# Patient Record
Sex: FEMALE | Race: WHITE | NOT HISPANIC OR LATINO | Employment: OTHER | URBAN - METROPOLITAN AREA
[De-identification: names, ages, dates, MRNs, and addresses within clinical notes are randomized per-mention and may not be internally consistent; named-entity substitution may affect disease eponyms.]

---

## 2017-12-13 DIAGNOSIS — Z13.820 OSTEOPOROSIS SCREENING: Primary | ICD-10-CM

## 2017-12-13 DIAGNOSIS — Z12.31 SCREENING MAMMOGRAM, ENCOUNTER FOR: Primary | ICD-10-CM

## 2017-12-29 ENCOUNTER — HOSPITAL ENCOUNTER (OUTPATIENT)
Dept: RADIOLOGY | Facility: HOSPITAL | Age: 67
Discharge: HOME | End: 2017-12-29
Attending: FAMILY MEDICINE
Payer: MEDICARE

## 2017-12-29 DIAGNOSIS — Z13.820 OSTEOPOROSIS SCREENING: ICD-10-CM

## 2017-12-29 DIAGNOSIS — Z12.31 SCREENING MAMMOGRAM, ENCOUNTER FOR: ICD-10-CM

## 2017-12-29 PROCEDURE — 77067 SCR MAMMO BI INCL CAD: CPT | Mod: TC

## 2017-12-29 PROCEDURE — 77081 DXA BONE DENSITY APPENDICULR: CPT | Mod: 26,,, | Performed by: RADIOLOGY

## 2017-12-29 PROCEDURE — 77063 BREAST TOMOSYNTHESIS BI: CPT | Mod: 26,,, | Performed by: RADIOLOGY

## 2017-12-29 PROCEDURE — 77067 SCR MAMMO BI INCL CAD: CPT | Mod: 26,,, | Performed by: RADIOLOGY

## 2017-12-29 PROCEDURE — 77081 DXA BONE DENSITY APPENDICULR: CPT | Mod: TC

## 2018-06-03 ENCOUNTER — HOSPITAL ENCOUNTER (INPATIENT)
Facility: HOSPITAL | Age: 68
LOS: 1 days | Discharge: HOME | DRG: 176 | End: 2018-06-04
Attending: EMERGENCY MEDICINE | Admitting: INTERNAL MEDICINE
Payer: MEDICARE

## 2018-06-03 DIAGNOSIS — I26.99 OTHER ACUTE PULMONARY EMBOLISM WITHOUT ACUTE COR PULMONALE: Primary | ICD-10-CM

## 2018-06-03 DIAGNOSIS — I26.09 OTHER PULMONARY EMBOLISM WITH ACUTE COR PULMONALE, UNSPECIFIED CHRONICITY: ICD-10-CM

## 2018-06-03 DIAGNOSIS — R06.02 SHORTNESS OF BREATH: ICD-10-CM

## 2018-06-03 DIAGNOSIS — I26.99 PULMONARY EMBOLISM: ICD-10-CM

## 2018-06-03 PROBLEM — E78.5 HYPERLIPIDEMIA: Status: ACTIVE | Noted: 2018-06-03

## 2018-06-03 PROBLEM — K21.9 GERD (GASTROESOPHAGEAL REFLUX DISEASE): Status: ACTIVE | Noted: 2018-06-03

## 2018-06-03 LAB
ALBUMIN SERPL BCP-MCNC: 3.8 G/DL
ALP SERPL-CCNC: 89 U/L
ALT SERPL W/O P-5'-P-CCNC: 26 U/L
ANION GAP SERPL CALC-SCNC: 13 MMOL/L
APTT BLDCRRT: 24.7 SEC
APTT BLDCRRT: 25.3 SEC
AST SERPL-CCNC: 27 U/L
BASOPHILS # BLD AUTO: 0.02 K/UL
BASOPHILS # BLD AUTO: 0.03 K/UL
BASOPHILS NFR BLD: 0.3 %
BASOPHILS NFR BLD: 0.3 %
BILIRUB SERPL-MCNC: 0.5 MG/DL
BNP SERPL-MCNC: 38 PG/ML
BUN SERPL-MCNC: 14 MG/DL
CALCIUM SERPL-MCNC: 9.6 MG/DL
CHLORIDE SERPL-SCNC: 106 MMOL/L
CHOLEST SERPL-MCNC: 203 MG/DL
CHOLEST/HDLC SERPL: 4.5 {RATIO}
CO2 SERPL-SCNC: 21 MMOL/L
CREAT SERPL-MCNC: 0.9 MG/DL
DIFFERENTIAL METHOD: ABNORMAL
DIFFERENTIAL METHOD: ABNORMAL
EOSINOPHIL # BLD AUTO: 0.1 K/UL
EOSINOPHIL # BLD AUTO: 0.1 K/UL
EOSINOPHIL NFR BLD: 0.7 %
EOSINOPHIL NFR BLD: 1 %
ERYTHROCYTE [DISTWIDTH] IN BLOOD BY AUTOMATED COUNT: 13.5 %
ERYTHROCYTE [DISTWIDTH] IN BLOOD BY AUTOMATED COUNT: 13.5 %
EST. GFR  (AFRICAN AMERICAN): >60 ML/MIN/1.73 M^2
EST. GFR  (NON AFRICAN AMERICAN): >60 ML/MIN/1.73 M^2
ESTIMATED AVG GLUCOSE: 103 MG/DL
GLUCOSE SERPL-MCNC: 150 MG/DL
HBA1C MFR BLD HPLC: 5.2 %
HCT VFR BLD AUTO: 42.9 %
HCT VFR BLD AUTO: 43.9 %
HDLC SERPL-MCNC: 45 MG/DL
HDLC SERPL: 22.2 %
HGB BLD-MCNC: 13.3 G/DL
HGB BLD-MCNC: 14 G/DL
INR PPP: 1
INR PPP: 1
LDLC SERPL CALC-MCNC: 128.2 MG/DL
LYMPHOCYTES # BLD AUTO: 1.2 K/UL
LYMPHOCYTES # BLD AUTO: 1.8 K/UL
LYMPHOCYTES NFR BLD: 17.7 %
LYMPHOCYTES NFR BLD: 19.1 %
MCH RBC QN AUTO: 27.5 PG
MCH RBC QN AUTO: 28.3 PG
MCHC RBC AUTO-ENTMCNC: 31 G/DL
MCHC RBC AUTO-ENTMCNC: 31.9 G/DL
MCV RBC AUTO: 89 FL
MCV RBC AUTO: 89 FL
MONOCYTES # BLD AUTO: 0.5 K/UL
MONOCYTES # BLD AUTO: 0.7 K/UL
MONOCYTES NFR BLD: 7.6 %
MONOCYTES NFR BLD: 7.8 %
NEUTROPHILS # BLD AUTO: 5.1 K/UL
NEUTROPHILS # BLD AUTO: 6.6 K/UL
NEUTROPHILS NFR BLD: 71.8 %
NEUTROPHILS NFR BLD: 73.1 %
NONHDLC SERPL-MCNC: 158 MG/DL
PLATELET # BLD AUTO: 222 K/UL
PLATELET # BLD AUTO: 242 K/UL
PLATELET # BLD AUTO: 242 K/UL
PMV BLD AUTO: 10.3 FL
PMV BLD AUTO: 10.3 FL
PMV BLD AUTO: 10.5 FL
POTASSIUM SERPL-SCNC: 3.8 MMOL/L
PROT SERPL-MCNC: 7.8 G/DL
PROTHROMBIN TIME: 10.4 SEC
PROTHROMBIN TIME: 10.6 SEC
RBC # BLD AUTO: 4.84 M/UL
RBC # BLD AUTO: 4.94 M/UL
SODIUM SERPL-SCNC: 140 MMOL/L
TRIGL SERPL-MCNC: 149 MG/DL
TROPONIN I SERPL DL<=0.01 NG/ML-MCNC: 0.02 NG/ML
WBC # BLD AUTO: 6.96 K/UL
WBC # BLD AUTO: 9.14 K/UL

## 2018-06-03 PROCEDURE — 93005 ELECTROCARDIOGRAM TRACING: CPT

## 2018-06-03 PROCEDURE — 85610 PROTHROMBIN TIME: CPT

## 2018-06-03 PROCEDURE — 25000003 PHARM REV CODE 250: Performed by: INTERNAL MEDICINE

## 2018-06-03 PROCEDURE — 83880 ASSAY OF NATRIURETIC PEPTIDE: CPT

## 2018-06-03 PROCEDURE — 94761 N-INVAS EAR/PLS OXIMETRY MLT: CPT

## 2018-06-03 PROCEDURE — 25500020 PHARM REV CODE 255: Performed by: EMERGENCY MEDICINE

## 2018-06-03 PROCEDURE — 63600175 PHARM REV CODE 636 W HCPCS: Performed by: INTERNAL MEDICINE

## 2018-06-03 PROCEDURE — 85730 THROMBOPLASTIN TIME PARTIAL: CPT | Mod: 91

## 2018-06-03 PROCEDURE — 11000001 HC ACUTE MED/SURG PRIVATE ROOM

## 2018-06-03 PROCEDURE — 99291 CRITICAL CARE FIRST HOUR: CPT | Mod: 25

## 2018-06-03 PROCEDURE — 85025 COMPLETE CBC W/AUTO DIFF WBC: CPT | Mod: 91

## 2018-06-03 PROCEDURE — 80053 COMPREHEN METABOLIC PANEL: CPT

## 2018-06-03 PROCEDURE — 83036 HEMOGLOBIN GLYCOSYLATED A1C: CPT

## 2018-06-03 PROCEDURE — 93010 ELECTROCARDIOGRAM REPORT: CPT | Mod: ,,, | Performed by: INTERNAL MEDICINE

## 2018-06-03 PROCEDURE — 84484 ASSAY OF TROPONIN QUANT: CPT

## 2018-06-03 PROCEDURE — 93010 ELECTROCARDIOGRAM REPORT: CPT | Mod: 76,,, | Performed by: INTERNAL MEDICINE

## 2018-06-03 PROCEDURE — 85730 THROMBOPLASTIN TIME PARTIAL: CPT

## 2018-06-03 PROCEDURE — 85610 PROTHROMBIN TIME: CPT | Mod: 91

## 2018-06-03 PROCEDURE — 80061 LIPID PANEL: CPT

## 2018-06-03 RX ORDER — HEPARIN SODIUM,PORCINE/D5W 25000/250
16 INTRAVENOUS SOLUTION INTRAVENOUS CONTINUOUS
Status: DISCONTINUED | OUTPATIENT
Start: 2018-06-03 | End: 2018-06-03

## 2018-06-03 RX ORDER — IBUPROFEN 200 MG
24 TABLET ORAL
Status: DISCONTINUED | OUTPATIENT
Start: 2018-06-03 | End: 2018-06-04 | Stop reason: HOSPADM

## 2018-06-03 RX ORDER — LATANOPROST 50 UG/ML
SOLUTION/ DROPS OPHTHALMIC
COMMUNITY

## 2018-06-03 RX ORDER — ATORVASTATIN CALCIUM 20 MG/1
20 TABLET, FILM COATED ORAL DAILY
Status: DISCONTINUED | OUTPATIENT
Start: 2018-06-04 | End: 2018-06-04 | Stop reason: HOSPADM

## 2018-06-03 RX ORDER — IBUPROFEN 200 MG
16 TABLET ORAL
Status: DISCONTINUED | OUTPATIENT
Start: 2018-06-03 | End: 2018-06-04 | Stop reason: HOSPADM

## 2018-06-03 RX ORDER — SODIUM CHLORIDE 0.9 % (FLUSH) 0.9 %
5 SYRINGE (ML) INJECTION
Status: DISCONTINUED | OUTPATIENT
Start: 2018-06-03 | End: 2018-06-04 | Stop reason: HOSPADM

## 2018-06-03 RX ORDER — FAMOTIDINE 20 MG/1
20 TABLET, FILM COATED ORAL 2 TIMES DAILY
Status: DISCONTINUED | OUTPATIENT
Start: 2018-06-03 | End: 2018-06-04 | Stop reason: HOSPADM

## 2018-06-03 RX ORDER — ENOXAPARIN SODIUM 150 MG/ML
1.5 INJECTION SUBCUTANEOUS
Status: DISCONTINUED | OUTPATIENT
Start: 2018-06-03 | End: 2018-06-04

## 2018-06-03 RX ORDER — GLUCAGON 1 MG
1 KIT INJECTION
Status: DISCONTINUED | OUTPATIENT
Start: 2018-06-03 | End: 2018-06-04 | Stop reason: HOSPADM

## 2018-06-03 RX ORDER — LATANOPROST 50 UG/ML
1 SOLUTION/ DROPS OPHTHALMIC DAILY
Status: DISCONTINUED | OUTPATIENT
Start: 2018-06-03 | End: 2018-06-04 | Stop reason: HOSPADM

## 2018-06-03 RX ORDER — HEPARIN SODIUM 10000 [USP'U]/100ML
18 INJECTION, SOLUTION INTRAVENOUS
Status: DISCONTINUED | OUTPATIENT
Start: 2018-06-03 | End: 2018-06-03

## 2018-06-03 RX ORDER — HEPARIN SODIUM 5000 [USP'U]/ML
80 INJECTION, SOLUTION INTRAVENOUS; SUBCUTANEOUS
Status: DISCONTINUED | OUTPATIENT
Start: 2018-06-03 | End: 2018-06-03

## 2018-06-03 RX ADMIN — IOHEXOL 100 ML: 350 INJECTION, SOLUTION INTRAVENOUS at 11:06

## 2018-06-03 RX ADMIN — FAMOTIDINE 20 MG: 20 TABLET ORAL at 08:06

## 2018-06-03 RX ADMIN — ENOXAPARIN SODIUM 140 MG: 150 INJECTION SUBCUTANEOUS at 01:06

## 2018-06-03 NOTE — ED TRIAGE NOTES
68 Y/O F with CC of SOB. Pt reports symptoms started on Wednesday and worse with exertion. Pt reports hx of PE in 2011. No other complaints verbalized. NAD noted. Will continue to monitor. Patient on cardiac monitor, automatic blood pressure cuff.

## 2018-06-03 NOTE — ED NOTES
Pt to restroom, when returned to room pt dyspneic with SPO2 88% on RA and . Placed on 2L NC and now SPO2 98% and .

## 2018-06-03 NOTE — MEDICAL/APP STUDENT
Medical Student H&P    CC: SOB for 5 days    HPI:  Mrs Monae is a 68 yo female presenting to the ED for progressive SOB starting Wednesday while walking to the car.  She was in her usual state of health walking to her car when she noticed a slight SOB.  The SOB went away quickly with rest.  She first believed it was due to hot weather.  The SOB does no happen at rest, but became more severe and takes longer to go away by Saturday.  With her husbands encouragement, the patient agreed to come in to the hospital.  She is not currently on any medication except for eye drops for preventing glaucoma.  She denies any associated CP, fever, chills, night sweats, nausea/vomitting, SOB at night, or left arm pain.  She did have increased belching.  She denies smoking, recent travel, or trauma.  She had a past history of PE from Feb. 2011, with no initiating event.  She was treated with 1 1/2 years of Coumadin, but it was discontinued due to a C-scope and never restarted.  An US of LE in 2011 and 2014 showed no abnormalities per chart.  In June of last year she broke her fibula and remained minimally ambulatory until October.  At the time her doctor started her on full dose ASA, which she continued for 2-3 months.  In the ED she was started on 2L NC following de-sating to 88 walking to the bathroom.      PMHx: PE 2011  Fibula fracture 2017    Social Hx:  Never smoker  occasionally drinks alcohol 2 drinks a month  Denies any other drug use    Family Hx:  Both parents had HTN  Father had melanoma/skin cancer  Mother had heart attack when she was 79  Brother had problem with clots but unsure with etiology    Medications:  latanoprost 0.005% eye drops both eyes- 1 drop per eye qhs    No know Allergies    ROS:  General: Denies fever, chills, weight loss, change in appetite    Neuro: Denies HA, dizziness, numbness, tingling, or syncope  HENT: Denies recent URI, admits to cough  Chest: Denies CP or racing heart  Lungs: Complains of  increasing SOB, Denies sputum production  GI: Denies change in diet, BM, or nausea/vomiting  : Denies difficulty with urination  Skin: Denies rashes or and changes  Ext: Denies swelling, joint pain, or calf pain    Vitals:  T: 98.4  P: 112-128  R: 22-28  BP: 136-157/75-82  O2: 88-99    PE:  Gen: NAD, AAOx3, laying comfortably in bed  Eyes: PERRL, EOMI, no scleral icterus or conjunctivae abnormalities  Neck: supple, no elevation in JVD  Heart: tachycardia, regular rhythm, no murmurs or rubs appreciated  Lungs: Clear to auscultation bilaterally, no wheezes or crackles appreciated  Abd: S, NT, ND, BS+  Ext: no edema, erythema, or pain to palpation.  2+ pulses bilaterally radial and DP  Neur: CN II grossly intact to visual files, CNIII-VI grossly intact,  CNVII- face smile and eyebrow raise equal and present,  CNVIII: Hearing grossly intact  CN IX-X palate elevates evenly, CN XI Shoulder shrug equal and present, CN XII tongue protrudes equally with appropriate strength    Labs:     Ref. Range 6/3/2018 12:06   WBC Latest Ref Range: 3.90 - 12.70 K/uL 9.14   RBC Latest Ref Range: 4.00 - 5.40 M/uL 4.94   Hemoglobin Latest Ref Range: 12.0 - 16.0 g/dL 14.0   Hematocrit Latest Ref Range: 37.0 - 48.5 % 43.9   MCV Latest Ref Range: 82 - 98 fL 89   MCH Latest Ref Range: 27.0 - 31.0 pg 28.3   MCHC Latest Ref Range: 32.0 - 36.0 g/dL 31.9 (L)   RDW Latest Ref Range: 11.5 - 14.5 % 13.5   Platelets Latest Ref Range: 150 - 350 K/uL 242   MPV Latest Ref Range: 9.2 - 12.9 fL 10.3   Gran% Latest Ref Range: 38.0 - 73.0 % 71.8   Gran # (ANC) Latest Ref Range: 1.8 - 7.7 K/uL 6.6   Lymph% Latest Ref Range: 18.0 - 48.0 % 19.1   Lymph # Latest Ref Range: 1.0 - 4.8 K/uL 1.8   Mono% Latest Ref Range: 4.0 - 15.0 % 7.8   Mono # Latest Ref Range: 0.3 - 1.0 K/uL 0.7   Eosinophil% Latest Ref Range: 0.0 - 8.0 % 0.7   Eos # Latest Ref Range: 0.0 - 0.5 K/uL 0.1   Basophil% Latest Ref Range: 0.0 - 1.9 % 0.3   Baso # Latest Ref Range: 0.00 - 0.20 K/uL  0.03      Ref. Range 6/3/2018 12:06   Protime Latest Ref Range: 9.0 - 12.5 sec 10.6   Coumadin Monitoring INR Latest Ref Range: 0.8 - 1.2  1.0   aPTT Latest Ref Range: 21.0 - 32.0 sec 24.7     Imaging:  CXR 6/3/18 IMPRESSION:No acute findings.    CTA chest 6/3/18: There are extensive bilateral acute appearing pulmonary emboli which involve the left and right main pulmonary arteries as well as the segmental and subsegmental pulmonary arteries throughout all bilateral lobes.  No pericardial effusion.  No ventricular septal bowing appreciated.    US LE 6/3/18 Pending    A: Mrs Monae is a 66 yo woman presenting with acute progressive SOB for the last 5 days with history of PE that is currently untreated    Problem List:    PE:  -CTA chest: multiple pulmonary emboli  -Stable with O2 stats >90% at rest, decreased to 88% while walking  -Started 2L NC which improved   -Starting Enoxaparin 140mg injections today  -Will start PO eliquis Tomorrow to prepare for discharge  -Continue to monitor O2 stats     Tachycardia, Resolved  -2/2 PE  -Resolving, no intervention at this time    Glaucoma  -Well controlled on home meds  -Continue eye drops qhs    Obesity  -current BMI 33  -Patient started going to the gym recently  -Will  on the importance of exercise and healthy eating    Health maintenance:  -Patient unsure of last tetanus, pneumococcal vaccination statis   -Up to date with flue shot  -Defer to PCP for management of these vaccines  -Dexa scan normal 12/17  -Last mammogram with no abnormalities in Dec.  -Last Pap was normal, unsure of date  -PCP Dr. Rony Mayfield    Past History of AoCD:  -H/H is 14/43  -No intervention needed at this time    Dislipidemia:  -Ordered lipid panel to assess    Code: Full  Diet: Regular  In patient  PCP: Dr. Chaparro Lanza L3  [facility] IM Team B

## 2018-06-03 NOTE — H&P
[facility] Internal Medicine History and Physical - Resident Note    Admitting Team: Medicine Team B  Attending Physician: Dr. Gold  Resident: Ahmet  Interns: Marianne    Date of Admit: 6/3/2018    Chief Complaint     Shortness of Breath (c/o SOB since Wednesday, worse on exertion. )   for 5 days    Subjective:      History of Present Illness:  Romy Monae is a 67 y.o. female who  has a past medical history of PE in 2011 (was on Warfarin for roughly 1 year per patient), Fibula fracture in 6/2017, and Glaucoma. The patient presented to [facility] on 6/3/2018 with a primary complaint of Shortness of Breath (c/o SOB since Wednesday, worse on exertion. )    Ms. Monae was in her usual state of health (indepedently performs all ADLs, able to walk 3-4 flights of stairway without difficulty), until 5 days ago when she began to experience gradual onset of shortness of breath with exertion. The SOB has been progressively worsening for the past 5 days. No chest pain, fever, chills. She denies recent history of travel, trauma or injury, PMH of cancers. Patient reports history of PE and DVT in 2/2011, and was treated with Warfarin for about 1 year. Warfarin was discontinued due to colonoscopy. She is not awake of what provoked that episode of PE. In addition, patient had R fibula fracture in 6/2017. She denies any surgery for that fracture, but reports immobility until 10/2017. Otherwise, patient is feeling alright. She denies any appetite/weight change, change in bowel habits, constipation/diarrhea, dysuria, lower extremity swelling, extremity pain.     Past Medical History:  Past Medical History:   Diagnosis Date    Fibula fracture     6/17   Per Patient:  PE and DVT in 2/2011  Glaucoma    Past Surgical History:  History reviewed. No pertinent surgical history.    Allergies:  Review of patient's allergies indicates:   Allergen Reactions    No known allergies        Home  "Medications:  Prior to Admission medications    Medication Sig Start Date End Date Taking? Authorizing Provider   latanoprost 0.005 % ophthalmic solution latanoprost 0.005 % eye drops   INSTILL 1 DROP IN BOTH EYES EVERY NIGHT AT BEDTIME   Yes Historical Provider, MD   sumatriptan (IMITREX) 50 MG tablet Take 50 mg by mouth. 1 Tablet Oral Every day.  may repeat in 2hrs if needed after first dose. Do not take more than 4in a 24hour period  6/3/18 No Historical Provider, MD   warfarin (COUMADIN) 5 MG tablet Take 5 mg by mouth. 1 Tablet Oral Every day  6/3/18 No Historical Provider, MD       Family History:  Father with melanoma  Mother had heart attack at age 79  Brother developed PE (after surgery)  Otherwise, no history of cancer, blood clotting disorders.     Social History:  Social History   Substance Use Topics    Smoking status: Never Smoker    Smokeless tobacco: Not on file    Alcohol use No   Tobacco: None  Alcohol: Occasional  Recreational Drugs: None  Housing: Lives with   Occupational: Retired .   No recent travel or immobility.    Review of Systems:  Pertinent positives and negatives are listed in HPI.  All other systems are reviewed and are negative.    Health Maintaince :   Primary Care Physician: Dr. Rony Mayfield  Immunizations:   TDap is unknown per patient.  Influenza is up to date.  Pneumovax is up to date.  Cancer Screening:  PAP: is unknown per patient.   Mammogram: is up to date. 2017 unremarkable.  Colonoscopy: is up to date. 2011 with diverticula but no polyps.     Objective:   Last 24 Hour Vital Signs:  BP  Min: 136/75  Max: 157/82  Temp  Av.4 °F (36.9 °C)  Min: 98.4 °F (36.9 °C)  Max: 98.4 °F (36.9 °C)  Pulse  Av.3  Min: 112  Max: 128  Resp  Av  Min: 22  Max: 28  SpO2  Av %  Min: 88 %  Max: 99 %  Height  Av' 5" (165.1 cm)  Min: 5' 5" (165.1 cm)  Max: 5' 5" (165.1 cm)  Weight  Av.7 kg (200 lb)  Min: 90.7 kg (200 lb)  Max: 90.7 kg (200 lb)  Body " mass index is 33.28 kg/m².  No intake/output data recorded.    Physical Examination:  General: Alert and awake in NAD. On 2L NC.  Head:  Normocephalic and atraumatic  Eyes:  PERRL; EOMi with anicteric sclera and clear conjunctivae  Mouth:  Poor dentition. Oropharynx clear; moist mucous membranes  Cardio:  No JVD noted. Regular rate and rhythm with normal S1 and S2; no murmurs.  Resp:  CTAB and unlabored; no wheezes/crackles   Abdom: Soft, NTND with normoactive bowel sounds  Extrem: WWP with no clubbing, cyanosis or edema  Skin:  No rashes, lesions, or color changes  Pulses: 2+ and symmetric distally  Neuro:  AAOx3; cooperative and pleasant with no focal deficits    Laboratory:  Most Recent Data:  CBC: Lab Results   Component Value Date    WBC 9.14 06/03/2018    HGB 14.0 06/03/2018    HCT 43.9 06/03/2018     06/03/2018     06/03/2018    MCV 89 06/03/2018    RDW 13.5 06/03/2018     BMP: Lab Results   Component Value Date     06/03/2018    K 3.8 06/03/2018     06/03/2018    CO2 21 (L) 06/03/2018    BUN 14 06/03/2018    CREATININE 0.9 06/03/2018     (H) 06/03/2018    CALCIUM 9.6 06/03/2018    MG 2.2 03/08/2011    PHOS 3.6 03/08/2011     LFTs: Lab Results   Component Value Date    PROT 7.8 06/03/2018    ALBUMIN 3.8 06/03/2018    BILITOT 0.5 06/03/2018    AST 27 06/03/2018    ALKPHOS 89 06/03/2018    ALT 26 06/03/2018     Coags:   Lab Results   Component Value Date    INR 1.0 06/03/2018     Urinalysis: No results found for: LABURIN, COLORU, CLARITYU, SPECGRAV, LABSPEC, NITRITE, PROTEINUR, GLUCOSEU, KETONESU, UROBILINOGEN, BILIRUBINUR, BLOODU, RBCU, WBCUA    Trended Lab Data:    Recent Labs  Lab 06/03/18  1025 06/03/18  1206   WBC 6.96 9.14   HGB 13.3 14.0   HCT 42.9 43.9    242  242   MCV 89 89   RDW 13.5 13.5     --    K 3.8  --      --    CO2 21*  --    BUN 14  --    CREATININE 0.9  --    *  --    PROT 7.8  --    ALBUMIN 3.8  --    BILITOT 0.5  --    AST 27  --     ALKPHOS 89  --    ALT 26  --        Trended Cardiac Data:    Recent Labs  Lab 06/03/18  1025   TROPONINI 0.016   BNP 38     Other Results:  EKG (my interpretation):   6/3 EKG: Sinus tachycardia. No evidence of ST elevations.    Radiology:  Imaging Results          US Lower Extremity Veins Bilateral (In process)                CTA Chest Non-Coronary (PE Study) (Final result)  Result time 06/03/18 11:45:04    Final result by Bernard Jay MD (06/03/18 11:45:04)                 Impression:      Extensive bilateral acute appearing pulmonary emboli as above.    COMMUNICATION  This critical result was discovered/received at 11:36 a.m. on 06/03/2018.  The critical information above was relayed directly by me by telephone to Dr. Cole On 06/03/2018 at 11:37 a.m..      Electronically signed by: Bernard Jay MD  Date:    06/03/2018  Time:    11:45             Narrative:    EXAMINATION:  CTA CHEST NON CORONARY    CLINICAL HISTORY:  Dyspnea, cardiac origin suspected;    TECHNIQUE:  Low dose axial images, sagittal and coronal reformations were obtained from the thoracic inlet to the lung bases following the IV administration of 100 mL of Omnipaque 350.  Contrast timing was optimized to evaluate the pulmonary arteries.    COMPARISON:  None    FINDINGS:  Chest:    Heart and Great Vessels: There are extensive bilateral acute appearing pulmonary emboli which involve the left and right main pulmonary arteries as well as the segmental and subsegmental pulmonary arteries throughout all bilateral lobes.  No pericardial effusion.  No ventricular septal bowing appreciated.    Thoracic Adenopathy: None.    Lungs: Clear bilaterally.    Visualized Upper Abdomen:    No abnormal findings    Bones: No acute findings.    Miscellaneous: None.                               X-Ray Chest PA And Lateral (Final result)  Result time 06/03/18 10:33:28    Final result by Bernard Jay MD (06/03/18 10:33:28)                 Impression:       No acute findings.      Electronically signed by: Bernard Jay MD  Date:    06/03/2018  Time:    10:33             Narrative:    EXAMINATION:  XR CHEST PA AND LATERAL    CLINICAL HISTORY:  Shortness of breath;    TECHNIQUE:  PA and lateral views of the chest were performed.    COMPARISON:  None    FINDINGS:  The cardiac and mediastinal silhouettes appear within normal limits.   The lungs are clear bilaterally.  Multilevel degenerative findings noted throughout the visualized spine.                                   Assessment:     Romy Monae is a 67 y.o. female with pulmonary embolism.      Plan:     Pulmonary Embolism  - History of Saddle PE, L popliteal DVT in 2/2011. Was treated with Warfarin for about 1 year. Patient also reports h/o DVT at that time. LE US in 2014 with no clots.   - Reports 5 days h/o gradual onset progressive SOB on exertion. Similar to h/o PE in the past.   - CTA with extensive bilateral pulmonary emboli. EKG with sinus tachycardia.  - Will order full dose lovenox for anticoagulation.  - Likely to switch to Eliquis tomorrow.   - Ordered LE US. Will follow up results.   - Currently stable on 2L NC. Will monitor respiratory status.   - PESI score of 89 (intermediate risk). HASBLED score of 1 (age).   - Patient will likely benefit from long term anticoagulation. Risk and benefits of anticoagulation discussed with patient and family.     Glaucoma  - No acute issues.  - Continue latanoprost eye drip.     Obesity  - Body mass index is 33.28 kg/m².  - Counseled on healthy diet and lifestyle.   - Will check a1c and lipid panel.     History of AoCD  - Previous diagnosis documented in 2011.   - Initial H/H wnl. Will monitor CBC.  - Colonoscopy in 12/2011 with diverticula but no polyps.    Health Maintenance  - UTD with influenza and PNA. Not sure about Tdap.  - UTD with colonoscopy and mammogram. Not sure about PAP per patient.  - DeXA wnl in 12/2017  - PCP is Dr. Rony Mayfield.    Code  Status:     Full    Diet: Regular  PPx: Full dose Lovenox  Dispo: pending clinical improvement. Will follow up LE US.     Lj Toure Ray  [facility] Internal Medicine HO-I  [facility] Medicine Service Team B    [facility] Medicine Hospitalist Pager numbers:   [facility] Hospitalist Medicine Team A (Brandy/Rafi): [phone]  [facility] Hospitalist Medicine Team B (Luan/Asif):  [phone]

## 2018-06-03 NOTE — ED PROVIDER NOTES
Encounter Date: 6/3/2018    SCRIBE #1 NOTE: I, Huma Mar, am scribing for, and in the presence of,  Dr. Cole. I have scribed the entire note.       History     Chief Complaint   Patient presents with    Shortness of Breath     c/o SOB since Wednesday, worse on exertion.      This is a 67 y.o. female with PMHx of DVT and PE who presents to the ED with a cc of SOB noted x 4 days. The SOB is gradually worsening. Pt reports associated chronic right ankle swelling, belching, nausea, and nervousness, but denies chest pain, fever, or vomiting. Symptoms worsened with walking, exertion or any activity. Symptoms are alleviated by sitting for awhile. No other exacerbating or alleviating factors. Pt has a prior history of similar symptoms. She has been off of anti-coagulants since 2012. Pt reports no other medical complaints or injuries at this time.         The history is provided by the patient.     Review of patient's allergies indicates:   Allergen Reactions    No known allergies      Past Medical History:   Diagnosis Date    Fibula fracture     6/17     History reviewed. No pertinent surgical history.  History reviewed. No pertinent family history.  Social History   Substance Use Topics    Smoking status: Never Smoker    Smokeless tobacco: Not on file    Alcohol use No     Review of Systems   Constitutional: Positive for fatigue. Negative for chills and fever.   HENT: Negative for congestion, sore throat and voice change.    Eyes: Negative for photophobia, pain and redness.   Respiratory: Positive for shortness of breath. Negative for cough and choking.    Cardiovascular: Positive for leg swelling. Negative for chest pain and palpitations.   Gastrointestinal: Positive for nausea. Negative for abdominal distention, abdominal pain, diarrhea and vomiting.        Positive for belching.   Genitourinary: Negative for difficulty urinating, dysuria and frequency.   Musculoskeletal: Negative for neck pain and neck  stiffness.   Skin: Negative for pallor and rash.   Neurological: Negative for seizures, speech difficulty, light-headedness, numbness and headaches.   All other systems reviewed and are negative.      Physical Exam     Initial Vitals [06/03/18 0943]   BP Pulse Resp Temp SpO2   (!) 157/82 (!) 115 (!) 25 98.4 °F (36.9 °C) 99 %      MAP       107         Physical Exam    Nursing note and vitals reviewed.  Constitutional: She appears well-developed and well-nourished. No distress.   HENT:   Head: Normocephalic and atraumatic.   Mouth/Throat: Mucous membranes are dry.   Oropharynx clear   Eyes: Conjunctivae and EOM are normal. Pupils are equal, round, and reactive to light.   Neck: Normal range of motion. Neck supple. No tracheal deviation present.   Cardiovascular: Regular rhythm, normal heart sounds and intact distal pulses. Tachycardia present.    Pulmonary/Chest: Breath sounds normal. She has no wheezes. She has no rhonchi. She has no rales.   Abdominal: Soft. Bowel sounds are normal. She exhibits no distension. There is no tenderness.   Musculoskeletal: Normal range of motion. She exhibits no edema or tenderness.   Neurological: She is alert and oriented to person, place, and time. She has normal strength. No cranial nerve deficit or sensory deficit.   Skin: Skin is warm and dry. Capillary refill takes less than 2 seconds.         ED Course   Critical Care  Date/Time: 6/3/2018 11:39 AM  Performed by: ABELARDO LOPEZ  Authorized by: ABELARDO LOPEZ   Direct patient critical care time: 15 minutes  Additional history critical care time: 15 minutes  Ordering / reviewing critical care time: 15 minutes  Documentation critical care time: 15 minutes  Consulting other physicians critical care time: 15 minutes  Consult with family critical care time: 10 minutes  Total critical care time (exclusive of procedural time) : 85 minutes  Critical care time was exclusive of separately billable procedures and treating other  patients.  Critical care was necessary to treat or prevent imminent or life-threatening deterioration of the following conditions: respiratory failure.  Critical care was time spent personally by me on the following activities: interpretation of cardiac output measurements, evaluation of patient's response to treatment, examination of patient, obtaining history from patient or surrogate, ordering and performing treatments and interventions, ordering and review of laboratory studies, ordering and review of radiographic studies, pulse oximetry and re-evaluation of patient's condition.        Labs Reviewed   CBC W/ AUTO DIFFERENTIAL - Abnormal; Notable for the following:        Result Value    MCHC 31.0 (*)     Gran% 73.1 (*)     Lymph% 17.7 (*)     All other components within normal limits   COMPREHENSIVE METABOLIC PANEL - Abnormal; Notable for the following:     CO2 21 (*)     Glucose 150 (*)     All other components within normal limits   TROPONIN I   B-TYPE NATRIURETIC PEPTIDE   PROTIME-INR   APTT     EKG Readings: (Independently Interpreted)   Initial Reading: No STEMI. Previous EKG Date: No prior for comparison. Rhythm: Sinus Tachycardia. Heart Rate: 105. Ectopy: No Ectopy. Conduction: Normal. ST Segments: Normal ST Segments. T Waves: Normal. Axis: Normal.         X-Rays:   Independently Interpreted Readings:   Other Readings:  I have visualized all imaging for this patient, radiology has done the interpretation.    Imaging Results          US Lower Extremity Veins Bilateral (Final result)     Abnormal  Result time 06/03/18 16:50:58    Final result by Jhonatan Hurt MD (06/03/18 16:50:58)                 Impression:      Acute, nonocclusive deep vein thrombus in the left common femoral vein.    Patient has known pulmonary embolism as identified on CTA the chest on 06/03/2018.    This report was flagged in Epic as abnormal.      Electronically signed by: Jhonatan Hurt  MD  Date:    06/03/2018  Time:    16:50             Narrative:    EXAMINATION:  US LOWER EXTREMITY VEINS BILATERAL    CLINICAL HISTORY:  bilateral PEs;    TECHNIQUE:  Duplex and color flow Doppler and dynamic compression was performed of the bilateral lower extremity veins was performed.    COMPARISON:  None    FINDINGS:  Right thigh veins: The common femoral, femoral, popliteal, upper greater saphenous, and deep femoral veins are patent and free of thrombus. The veins are normally compressible and have normal phasic flow and augmentation response.    Right calf veins: The visualized calf veins are patent.    Left thigh veins: Nonocclusive acute deep vein thrombosis is visualized in the left common femoral vein.  The femoral, popliteal, upper greater saphenous, and deep femoral veins are patent and free of thrombus.    Left calf veins: The visualized calf veins are patent.    Miscellaneous: None                               CTA Chest Non-Coronary (PE Study) (Final result)  Result time 06/03/18 11:45:04    Final result by Bernard Jay MD (06/03/18 11:45:04)                 Impression:      Extensive bilateral acute appearing pulmonary emboli as above.    COMMUNICATION  This critical result was discovered/received at 11:36 a.m. on 06/03/2018.  The critical information above was relayed directly by me by telephone to Dr. Cole On 06/03/2018 at 11:37 a.m..      Electronically signed by: Bernard Jay MD  Date:    06/03/2018  Time:    11:45             Narrative:    EXAMINATION:  CTA CHEST NON CORONARY    CLINICAL HISTORY:  Dyspnea, cardiac origin suspected;    TECHNIQUE:  Low dose axial images, sagittal and coronal reformations were obtained from the thoracic inlet to the lung bases following the IV administration of 100 mL of Omnipaque 350.  Contrast timing was optimized to evaluate the pulmonary arteries.    COMPARISON:  None    FINDINGS:  Chest:    Heart and Great Vessels: There are extensive bilateral  acute appearing pulmonary emboli which involve the left and right main pulmonary arteries as well as the segmental and subsegmental pulmonary arteries throughout all bilateral lobes.  No pericardial effusion.  No ventricular septal bowing appreciated.    Thoracic Adenopathy: None.    Lungs: Clear bilaterally.    Visualized Upper Abdomen:    No abnormal findings    Bones: No acute findings.    Miscellaneous: None.                               X-Ray Chest PA And Lateral (Final result)  Result time 06/03/18 10:33:28    Final result by Bernard Jay MD (06/03/18 10:33:28)                 Impression:      No acute findings.      Electronically signed by: Bernard Jay MD  Date:    06/03/2018  Time:    10:33             Narrative:    EXAMINATION:  XR CHEST PA AND LATERAL    CLINICAL HISTORY:  Shortness of breath;    TECHNIQUE:  PA and lateral views of the chest were performed.    COMPARISON:  None    FINDINGS:  The cardiac and mediastinal silhouettes appear within normal limits.   The lungs are clear bilaterally.  Multilevel degenerative findings noted throughout the visualized spine.                                Medical Decision Making:   Initial Assessment:    67 y.o. female with PMHx of DVT and PE who presents to the ED with a cc of SOB noted x 4 days.  Differential Diagnosis:   Pulmonary infectious process, COPD, asthma, pulmonary embolus and congestive heart failure.    Independently Interpreted Test(s):   I have ordered and independently interpreted X-rays - see prior notes.  I have ordered and independently interpreted EKG Reading(s) - see prior notes  Clinical Tests:   Lab Tests: Reviewed       <> Summary of Lab: benign  ED Management:  Patient started on heparin and informed of results as well as plan to admit.  Discussed with [facility] who will see and admit the patient.  Patient comfortable with plan at this time, a symptomatically at this time.                      Clinical Impression:      1. Other acute pulmonary embolism without acute cor pulmonale    2. Shortness of breath    3. Pulmonary embolism    4. Other pulmonary embolism with acute cor pulmonale, unspecified chronicity              Disposition:   Disposition: Admitted  Condition: Fair    Scribe attestation: I, Dr. Rogerio Cole, personally performed the services described in this documentation. All medical record entries made by the scribe were at my direction and in my presence.  I have reviewed the chart and agree that the record reflects my personal performance and is accurate and complete. Rogerio Cole MD.  3:54 PM 06/17/2018                        Rogerio Cole MD  06/17/18 1555

## 2018-06-03 NOTE — PLAN OF CARE
Problem: Patient Care Overview  Goal: Plan of Care Review  Outcome: Ongoing (interventions implemented as appropriate)  POC discussed with pt and spouse.Pt is awake and alert,oriented X4. No distress noted. Denies any pain. Continues to be NSR on tele with HR in 80's. Bed in lowest position. Safety/Fall precautions maintained. Call bell in reach. All questions answered. Verbalized understanding.

## 2018-06-04 VITALS
BODY MASS INDEX: 32.87 KG/M2 | HEIGHT: 65 IN | SYSTOLIC BLOOD PRESSURE: 147 MMHG | OXYGEN SATURATION: 96 % | TEMPERATURE: 98 F | DIASTOLIC BLOOD PRESSURE: 67 MMHG | HEART RATE: 75 BPM | WEIGHT: 197.31 LBS | RESPIRATION RATE: 20 BRPM

## 2018-06-04 PROBLEM — I27.20 PULMONARY HYPERTENSION: Status: ACTIVE | Noted: 2018-06-04

## 2018-06-04 PROBLEM — E78.5 HYPERLIPIDEMIA: Status: ACTIVE | Noted: 2018-06-04

## 2018-06-04 LAB
ALBUMIN SERPL BCP-MCNC: 3.5 G/DL
ALP SERPL-CCNC: 79 U/L
ALT SERPL W/O P-5'-P-CCNC: 24 U/L
ANION GAP SERPL CALC-SCNC: 9 MMOL/L
AORTIC VALVE REGURGITATION: ABNORMAL
AST SERPL-CCNC: 24 U/L
BASOPHILS # BLD AUTO: 0.07 K/UL
BASOPHILS NFR BLD: 0.9 %
BILIRUB SERPL-MCNC: 0.6 MG/DL
BUN SERPL-MCNC: 10 MG/DL
CALCIUM SERPL-MCNC: 9.2 MG/DL
CHLORIDE SERPL-SCNC: 106 MMOL/L
CO2 SERPL-SCNC: 24 MMOL/L
CREAT SERPL-MCNC: 0.8 MG/DL
DIASTOLIC DYSFUNCTION: YES
DIFFERENTIAL METHOD: NORMAL
EOSINOPHIL # BLD AUTO: 0.2 K/UL
EOSINOPHIL NFR BLD: 3 %
ERYTHROCYTE [DISTWIDTH] IN BLOOD BY AUTOMATED COUNT: 13.3 %
EST. GFR  (AFRICAN AMERICAN): >60 ML/MIN/1.73 M^2
EST. GFR  (NON AFRICAN AMERICAN): >60 ML/MIN/1.73 M^2
ESTIMATED PA SYSTOLIC PRESSURE: 52.56
GLUCOSE SERPL-MCNC: 98 MG/DL
HCT VFR BLD AUTO: 42.4 %
HGB BLD-MCNC: 13.7 G/DL
LYMPHOCYTES # BLD AUTO: 2.5 K/UL
LYMPHOCYTES NFR BLD: 31.7 %
MCH RBC QN AUTO: 28.5 PG
MCHC RBC AUTO-ENTMCNC: 32.3 G/DL
MCV RBC AUTO: 88 FL
MONOCYTES # BLD AUTO: 0.7 K/UL
MONOCYTES NFR BLD: 8.5 %
NEUTROPHILS # BLD AUTO: 4.3 K/UL
NEUTROPHILS NFR BLD: 55.8 %
PLATELET # BLD AUTO: 236 K/UL
PMV BLD AUTO: 10.2 FL
POTASSIUM SERPL-SCNC: 4.1 MMOL/L
PROT SERPL-MCNC: 7 G/DL
RBC # BLD AUTO: 4.81 M/UL
RETIRED EF AND QEF - SEE NOTES: 55 (ref 55–65)
SODIUM SERPL-SCNC: 139 MMOL/L
TRICUSPID VALVE REGURGITATION: ABNORMAL
WBC # BLD AUTO: 7.75 K/UL

## 2018-06-04 PROCEDURE — 36415 COLL VENOUS BLD VENIPUNCTURE: CPT

## 2018-06-04 PROCEDURE — 97161 PT EVAL LOW COMPLEX 20 MIN: CPT

## 2018-06-04 PROCEDURE — 80053 COMPREHEN METABOLIC PANEL: CPT

## 2018-06-04 PROCEDURE — 25000003 PHARM REV CODE 250: Performed by: INTERNAL MEDICINE

## 2018-06-04 PROCEDURE — 93306 TTE W/DOPPLER COMPLETE: CPT

## 2018-06-04 PROCEDURE — 85025 COMPLETE CBC W/AUTO DIFF WBC: CPT

## 2018-06-04 RX ORDER — ATORVASTATIN CALCIUM 20 MG/1
20 TABLET, FILM COATED ORAL DAILY
Qty: 30 TABLET | Refills: 11 | Status: SHIPPED | OUTPATIENT
Start: 2018-06-04 | End: 2019-12-19

## 2018-06-04 RX ORDER — FAMOTIDINE 20 MG/1
20 TABLET, FILM COATED ORAL 2 TIMES DAILY
Qty: 30 TABLET | Refills: 3 | Status: SHIPPED | OUTPATIENT
Start: 2018-06-04 | End: 2019-12-19

## 2018-06-04 RX ADMIN — ATORVASTATIN CALCIUM 20 MG: 20 TABLET, FILM COATED ORAL at 10:06

## 2018-06-04 RX ADMIN — FAMOTIDINE 20 MG: 20 TABLET ORAL at 10:06

## 2018-06-04 RX ADMIN — APIXABAN 10 MG: 5 TABLET, FILM COATED ORAL at 10:06

## 2018-06-04 NOTE — PROGRESS NOTES
.Pharmacy New Medication Education    Patient accepted medication education.    Pharmacy educated patient on name and purpose of medications and possible side effects, using the teach-back method.     Current Inpatient Medication Orders   apixaban tablet 10 mg   atorvastatin tablet 20 mg   dextrose 50% injection 12.5 g   dextrose 50% injection 25 g   famotidine tablet 20 mg   glucagon (human recombinant) injection 1 mg   glucose chewable tablet 16 g   glucose chewable tablet 24 g   latanoprost 0.005 % ophthalmic solution 1 drop   sodium chloride 0.9% flush 5 mL       Learners of pharmacy medication education included:  Patient    Patient +/- learner response:  Verbalized Understanding, Teachback

## 2018-06-04 NOTE — PLAN OF CARE
Home Oxygen Evaluation     Date Performed: 2018     1)         Patient's Home O2 Sat on room air, while at rest: 98%                               If O2 sats on room air at rest are 88% or below, patient qualifies. No additional testing needed. Document N/A in steps 2 and 3. If 89% or above, complete steps 2.        2)         Patient's O2 Sat on room air while exercisin                               If O2 sats on room air while exercising remain 89% or above patient does not qualify, no further testing needed Document N/A in step 3. If O2 sats on room air while exercising are 88% or below, continue to step 3.        3)         Patient's O2 Sat while exercising on O2: n/a                                           (Must show improvement from #2 for patients to qualify)     If O2 sats improve on oxygen, patient qualifies for portable oxygen. If not, the patient does not qualify.

## 2018-06-04 NOTE — PROGRESS NOTES
"[facility] Resident HO-1 Progress Note    Subjective:      Romy Monae is a 67 y.o.  female who is being followed by the [facility] service at [facility] for Pulmonary Embolism.    Ms. Monae is doing well. Able to wean off oxygen overnight. Able to ambulate to bathroom without significant dyspnea. No other new symptoms.      Objective:   Last 24 Hour Vital Signs:  BP  Min: 114/69  Max: 166/75  Temp  Av.8 °F (36.6 °C)  Min: 97 °F (36.1 °C)  Max: 98.4 °F (36.9 °C)  Pulse  Av  Min: 64  Max: 128  Resp  Av.9  Min: 16  Max: 28  SpO2  Av.4 %  Min: 88 %  Max: 99 %  Height  Av' 5" (165.1 cm)  Min: 5' 5" (165.1 cm)  Max: 5' 5" (165.1 cm)  Weight  Av.1 kg (198 lb 10.5 oz)  Min: 89.5 kg (197 lb 5 oz)  Max: 90.7 kg (200 lb)  I/O last 3 completed shifts:  In: 120 [P.O.:120]  Out: -     Physical Examination:  General:          Alert and awake in NAD. On room air.   Head:               Normocephalic and atraumatic  Eyes:               PERRL; EOMi with anicteric sclera and clear conjunctivae  Mouth:             Poor dentition. Oropharynx clear; moist mucous membranes  Cardio:             No JVD noted. Regular rate and rhythm with normal S1 and S2; no murmurs.  Resp:               CTAB and unlabored; no wheezes/crackles   Abdom:            Soft, NTND with normoactive bowel sounds  Extrem:            WWP with no clubbing, cyanosis or edema  Skin:                No rashes, lesions, or color changes  Pulses:            2+ and symmetric distally  Neuro:             AAOx3.    Laboratory:  Laboratory Data Reviewed: yes  Pertinent Findings:  CBC, CMP wnl.  Lab Results   Component Value Date    CHOL 203 (H) 2018    CHOL 141 2011     Lab Results   Component Value Date    HDL 45 2018    HDL 16 (L) 2011     Lab Results   Component Value Date    LDLCALC 128.2 2018    LDLCALC 93.2 2011     Lab Results   Component Value Date    TRIG " 149 06/03/2018    TRIG 159 (H) 03/06/2011     Lab Results   Component Value Date    CHOLHDL 22.2 06/03/2018    CHOLHDL 11.3 (L) 03/06/2011     Other Results:  EKG (my interpretation): NSR. No ST elevation.     Radiology Data Reviewed: yes  Pertinent Findings:  6/3 LE US - Acute, nonocclusive deep vein thrombus in the left common femoral vein.    Current Medications:     Infusions:       Scheduled:   apixaban  10 mg Oral BID    atorvastatin  20 mg Oral Daily    famotidine  20 mg Oral BID    latanoprost  1 drop Both Eyes Daily        PRN:  dextrose 50%, dextrose 50%, glucagon (human recombinant), glucose, glucose, sodium chloride 0.9%    Antibiotics and Day Number of Therapy:  None    Lines and Day Number of Therapy:  PIVx2    Assessment:     Romy Monae is a 67 y.o.female with  Patient Active Problem List    Diagnosis Date Noted    Pulmonary embolus 06/03/2018    GERD (gastroesophageal reflux disease) 06/03/2018    Shortness of breath 06/03/2018    Hyperlipidemia 06/03/2018        Plan:     Venous Thromboembolism   - History of Saddle PE, L popliteal DVT in 2/2011. Was treated with Warfarin for about 1 year. Patient also reports h/o DVT at that time. LE US in 2014 with no clots.   - Reports 5 days h/o gradual onset progressive SOB on exertion. Similar to h/o PE in the past.   - CTA with extensive bilateral pulmonary emboli. LE US with L non-occlusive DVT. EKG with sinus tachycardia.  - Full dose lovenox loaded for anticoagulation. Will switch to Eliquis.  - SOB resolved overnight. Currently stable on room air.   - Initial PESI score of 89 (intermediate risk). HASBLED score of 1 (age).   - Patient will likely benefit from long term anticoagulation. Risk and benefits of anticoagulation discussed with patient and family.      Dyslipidemia  - Non fasting lipid panel with TC mildly elevated 203 and borderline .  - Calculated ASCVD score of 14.2%. Likely benefit from high intensity statin.   - Added  Lipitor 20 mg daily. Counseled on diet and lifestyle modification.     Glaucoma  - No acute issues.  - Continue latanoprost eye drip.      Obesity  - Body mass index is 33.28 kg/m².  - Counseled on healthy diet and lifestyle.   - Will follow up a1c.      History of AoCD  - Previous diagnosis documented in 2011.   - H/H stable and wnl.   - Colonoscopy in 12/2011 with diverticula but no polyps.     Health Maintenance  - UTD with influenza and PNA. Not sure about Tdap.  - UTD with colonoscopy and mammogram. Not sure about PAP per patient.  - DeXA wnl in 12/2017  - PCP is Dr. Rony Mayfield.    Code: Full  Diet: Regular  PPx: Eliquis  Dispo: Stable. Will check SpO2 with ambulation. Anticipate home today.     Lj Bell  [facility] Internal [facility] HO-1  [facility] Internal Medicine Service Team B    [facility] Medicine Hospitalist Pager numbers:   [facility] Hospital[facility] Medicine Team A (Brandy/Rafi): [phone]  [facility] Hospitalist Medicine Team B (Luan/Asif):  [phone]

## 2018-06-04 NOTE — MEDICAL/APP STUDENT
Medical Student Progress Note:    S: Mrs Ibarra is awake in bed.  She says she was unable to sleep much last night due to continual care from the nurses and staff.  Denies SOB at rest and gets mildly SOB walking to the bathroom.  Denies CP, diaphoresis,  nausea, vomiting, fever, chills, HA, dizziness, or muscle pains.  Good appetite, no difficulty using the restroom, and is able to ambulate with mild SOB.    O:  Vitals  Tmax: 98.4  Pulse:   Resp: 17-28  O2: 88-99  Wt: 89.5    PE:  General: NAD, AAOx3, laying comfortably in bed  Eyes: EOMI, no scleral icterus or conjunctivae erythema   Nose: no rhinorrhea or epistaxis  Mouth: MMM, no erythema or exudate  Neck: supple without elevated JVD  Heart: RRR, no murmurs or rubs  Lungs: Clear to auscultation bilaterally, no wheezes or crackles  Abd: S, NT, ND, BS+  Extremities: 2+ pulses bilaterally radial ad DP, no erythema, edema, or pain in both UE and LE bilaterally    O2 sat at rest 93%    Meds:   apixaban  10 mg Oral BID    atorvastatin  20 mg Oral Daily    famotidine  20 mg Oral BID    latanoprost  1 drop Both Eyes Daily     Lab:  Results for CUAUHTEMOC IBARRA (MRN [id number]) as of 6/4/2018 08:29   Ref. Range 6/4/2018 03:26   WBC Latest Ref Range: 3.90 - 12.70 K/uL 7.75   RBC Latest Ref Range: 4.00 - 5.40 M/uL 4.81   Hemoglobin Latest Ref Range: 12.0 - 16.0 g/dL 13.7   Hematocrit Latest Ref Range: 37.0 - 48.5 % 42.4   MCV Latest Ref Range: 82 - 98 fL 88   MCH Latest Ref Range: 27.0 - 31.0 pg 28.5   MCHC Latest Ref Range: 32.0 - 36.0 g/dL 32.3   RDW Latest Ref Range: 11.5 - 14.5 % 13.3   Platelets Latest Ref Range: 150 - 350 K/uL 236   MPV Latest Ref Range: 9.2 - 12.9 fL 10.2   Gran% Latest Ref Range: 38.0 - 73.0 % 55.8   Gran # (ANC) Latest Ref Range: 1.8 - 7.7 K/uL 4.3   Lymph% Latest Ref Range: 18.0 - 48.0 % 31.7   Lymph # Latest Ref Range: 1.0 - 4.8 K/uL 2.5   Mono% Latest Ref Range: 4.0 - 15.0 % 8.5   Mono # Latest Ref Range: 0.3 - 1.0 K/uL 0.7    Eosinophil% Latest Ref Range: 0.0 - 8.0 % 3.0   Eos # Latest Ref Range: 0.0 - 0.5 K/uL 0.2   Basophil% Latest Ref Range: 0.0 - 1.9 % 0.9   Baso # Latest Ref Range: 0.00 - 0.20 K/uL 0.07   Sodium Latest Ref Range: 136 - 145 mmol/L 139   Potassium Latest Ref Range: 3.5 - 5.1 mmol/L 4.1   Chloride Latest Ref Range: 95 - 110 mmol/L 106   CO2 Latest Ref Range: 23 - 29 mmol/L 24   Anion Gap Latest Ref Range: 8 - 16 mmol/L 9   BUN, Bld Latest Ref Range: 8 - 23 mg/dL 10   Creatinine Latest Ref Range: 0.5 - 1.4 mg/dL 0.8   eGFR if non African American Latest Ref Range: >60 mL/min/1.73 m^2 >60   eGFR if  Latest Ref Range: >60 mL/min/1.73 m^2 >60   Glucose Latest Ref Range: 70 - 110 mg/dL 98   Calcium Latest Ref Range: 8.7 - 10.5 mg/dL 9.2   Alkaline Phosphatase Latest Ref Range: 55 - 135 U/L 79   Total Protein Latest Ref Range: 6.0 - 8.4 g/dL 7.0   Albumin Latest Ref Range: 3.5 - 5.2 g/dL 3.5   Total Bilirubin Latest Ref Range: 0.1 - 1.0 mg/dL 0.6   AST Latest Ref Range: 10 - 40 U/L 24   ALT Latest Ref Range: 10 - 44 U/L 24     Imaging:  CXR 6/3/18 IMPRESSION:No acute findings.     CTA chest 6/3/18: There are extensive bilateral acute appearing pulmonary emboli which involve the left and right main pulmonary arteries as well as the segmental and subsegmental pulmonary arteries throughout all bilateral lobes.  No pericardial effusion.  No ventricular septal bowing appreciated.     US LE 6/3/18 Acute, nonocclusive deep vein thrombus in the left common femoral vein.    A: Mrs Monae is a 68 yo woman with acute progressive SOB 2/2 Pes    Problem list:    VTE  -Hx of saddle emboli in 2011, took warfarin for 1 1/2yrs.  -Has been complaining of SOB for the last 5 days worsening since Wed.  -Imaging show multiple bilateral pulmonary emboli  -US LE shows Left common femoral nonocclusive thrombus  -Received lovenox yesterday, and will begin Eliquis PO today for anticoagulation  -SOB resolving, will test walking O2  sats to determine need for at home O2  -due to the recurrent nature of the Pts PE, without inciting event, lifelong anticoagulation is recommended    Dyslipidemia:  -Elevated cholesterol:203 and borderline 149  -Started on Lipitor 20mg  -counseled on diet and exercise    Glaucoma  -well controlled on home meds  -continue    Health Maintenance  -Patient unsure of last tetanus, pneumococcal vaccination statis   -Up to date with flue shot  -Defer to PCP for management of these vaccines  -Dexa scan normal 12/17  -Last mammogram with no abnormalities in Dec.  -Last Pap was normal, unsure of date  -PCP Dr. Rony Mayfield    Hx of AoCD  -H/H is 14/43  -No intervention needed at this time     Colby Lanza  L3  [facility] IM Team B

## 2018-06-04 NOTE — PLAN OF CARE
Problem: Patient Care Overview  Goal: Plan of Care Review  Outcome: Ongoing (interventions implemented as appropriate)  The pt has not slept well.  She states too many people are coming into her room.  She is on telemetry running normal sinus rhythm in the 60s to 70s.  VSS.  She has had no complaints during the night.

## 2018-06-04 NOTE — PLAN OF CARE
Patient is discharged to home. No needs noted upon discharge. Follow-Up appointments scheduled. Patient's prescriptions called into the [facility].    --TN met with patient,  at bedside. Follow-up information reviewed. They do not want to see Dr. Uribe in [place], they would like to stay in [place]. TN informed patient she can get her PCP to refer her to someone closer as well. TN offered to call and scheduled new follow-up. Patient refused. She stated she will schedule pulmonary htn follow-up.     Future Appointments  Date Time Provider Department Center   6/19/2018 1:40 PM Indio Brito MD [facility] HEM ONC [place] [facility]     Follow-up With  Details  Why  Contact Info   Rony Mayfield MD  On 6/8/2018  at 11:00 am, Primary Care Physician  [address]  [address]  [place] [address]  [phone]   Bernard Uribe MD  Schedule an appointment as soon as possible for a visit  Pulmonary Follow-Up--They will call you to schedule. Phone Number [phone]     Indio Brito MD  On 6/19/2018  at 1:40 pm--Hematology/Oncology Follow-Up  [address] [place] [address]  [phone]      06/04/18 1552   Final Note   Assessment Type Final Discharge Note   Discharge Disposition Home   What phone number can be called within the next 1-3 days to see how you are doing after discharge? [phone]   Hospital Follow Up  Appt(s) scheduled? Yes   Discharge plans and expectations educations in teach back method with documentation complete? Yes   Right Care Referral Info   Post Acute Recommendation No Care     Lawanda Solano RN  Transition Navigator  [phone]

## 2018-06-04 NOTE — PT/OT/SLP EVAL
Physical Therapy Evaluation and Discharge Note    Patient Name:  Romy Monae   MRN:  [id number]    Recommendations:     Discharge Recommendations:  home   Discharge Equipment Recommendations: none   Barriers to discharge: None    Assessment:     Romy Monae is a 67 y.o. female admitted with a medical diagnosis of Pulmonary embolism. .  At this time, patient is functioning at their prior level of function and does not require further acute PT services.     Recent Surgery: * No surgery found *      Plan:     During this hospitalization, patient does not require further acute PT services.  Please re-consult if situation changes.     Plan of Care Reviewed with: patient    Subjective     Communicated with GEOFFREY Gallardo prior to session.  Patient found supine with HOB elevated upon PT entry to room, agreeable to evaluation.      Chief Complaint: none  Patient comments/goals: to go home.   Pain/Comfort:  · Pain Rating 1: 0/10  · Pain Rating Post-Intervention 1: 0/10    Patients cultural, spiritual, [employer]ian conflicts given the current situation: None Verbalized to PT    Living Environment:  -Pt lives with her  in a [facility] with [facility]. Pt denies any recent falls/near falls. Pt denies using DME.   Prior to admission, patients level of function was independent, drives and independent with ADLs and IADLs.  Patient has the following equipment: none.  DME owned (not currently used): rolling walker, single point cane and wheelchair.  Upon discharge, patient will have assistance from  and family.   Objective:       General Precautions: Standard,     Orthopedic Precautions:N/A   Braces: N/A     Exams:  · Cognitive Exam:  Patient is oriented to Person, Place, Time and Situation and follows 100% of all commands   · Gross Motor Coordination:  WFL  · RLE ROM: WFL  · RLE Strength: WFL  · LLE ROM: WFL  · LLE Strength: WFL    Functional Mobility:  · Bed Mobility:     · Rolling Right: independence  · Supine to Sit:  independence  · Sit to Supine: independence  · Transfers:     · Sit to Stand:  independence with no AD  · Gait: 150 feet X2 trials  · Balance: Good     AM-PAC 6 CLICK MOBILITY  Total Score:24       Therapeutic Activities and Exercises:   -Bed mobility and gait as listed above.     Patient left HOB elevated with all lines intact and GEOFFREY Gallardo notified.    GOALS:    Physical Therapy Goals        Problem: Physical Therapy Goal    Goal Priority Disciplines Outcome Goal Variances Interventions   Physical Therapy Goal     PT/OT, PT Ongoing (interventions implemented as appropriate)                     History:     Past Medical History:   Diagnosis Date    Fibula fracture     6/17       History reviewed. No pertinent surgical history.    Clinical Decision Making:     History  Co-morbidities and personal factors that may impact the plan of care Examination  Body Structures and Functions, activity limitations and participation restrictions that may impact the plan of care Clinical Presentation   Decision Making/ Complexity Score   Co-morbidities:   [] Time since onset of injury / illness / exacerbation  [] Status of current condition  []Patient's cognitive status and safety concerns    [] Multiple Medical Problems (see med hx)  Personal Factors:   [] Patient's age  [] Prior Level of function   [] Patient's home situation (environment and family support)  [] Patient's level of motivation  [] Expected progression of patient      HISTORY:(criteria)    [x] 71942 - no personal factors/history    [] 73945 - has 1-2 personal factor/comorbidity     [] 03183 - has >3 personal factor/comorbidity     Body Regions:  [] Objective examination findings  [] Head     []  Neck  [] Trunk   [] Upper Extremity  [] Lower Extremity    Body Systems:  [] For communication ability, affect, cognition, language, and learning style: the assessment of the ability to make needs known, consciousness, orientation (person, place, and time), expected  emotional /behavioral responses, and learning preferences (eg, learning barriers, education  needs)  [] For the neuromuscular system: a general assessment of gross coordinated movement (eg, balance, gait, locomotion, transfers, and transitions) and motor function  (motor control and motor learning)  [] For the musculoskeletal system: the assessment of gross symmetry, gross range of motion, gross strength, height, and weight  [] For the integumentary system: the assessment of pliability(texture), presence of scar formation, skin color, and skin integrity  [] For cardiovascular/pulmonary system: the assessment of heart rate, respiratory rate, blood pressure, and edema     Activity limitations:    [] Patient's cognitive status and saf ety concerns          [] Status of current condition      [] Weight bearing restriction  [] Cardiopulmunary Restriction    Participation Restrictions:   [] Goals and goal agreement with the patient     [] Rehab potential (prognosis) and probable outcome      Examination of Body System: (criteria)    [x] 48173 - addressing 1-2 elements    [] 31245 - addressing a total of 3 or more elements     [] 72127 -  Addressing a total of 4 or more elements         Clinical Presentation: (criteria)  Stable - 38778     On examination of body system using standardized tests and measures patient presents with 4 or more elements from any of the following: body structures and functions, activity limitations, and/or participation restrictions.  Leading to a clinical presentation that is considered stable and/or uncomplicated                              Clinical Decision Making  (Eval Complexity):  Low- 90601     Time Tracking:     PT Received On: 06/04/18  PT Start Time: 1523     PT Stop Time: 1528  PT Total Time (min): 5 min     Billable Minutes: Evaluation 5      Andrew Tran PT, DPT  06/04/2018

## 2018-06-04 NOTE — PLAN OF CARE
Problem: Physical Therapy Goal  Goal: Physical Therapy Goal  Outcome: Ongoing (interventions implemented as appropriate)  PT evaluation and treatment orders received. Initial Physical Therapy evaluation complete 6/4/2018. Patient is at baseline with no further Physical Therapy Needs at this time.

## 2018-06-04 NOTE — NURSING
Discharge instructions and education provided. Patient voices understanding. IV site and telemetry discontinued without adverse reaction.

## 2018-06-04 NOTE — PLAN OF CARE
TN went to meet with patient, no family at bedside.  She is currently on room air. Patient does not have home health or medical equipment at home. She lives with her spouse and is independent. She may need eliquis on discharge. TN will continue to follow.     06/04/18 1136   Discharge Assessment   Assessment Type Discharge Planning Assessment   Confirmed/corrected address and phone number on facesheet? Yes   Assessment information obtained from? Patient   Prior to hospitilization cognitive status: Alert/Oriented   Prior to hospitalization functional status: Independent   Current cognitive status: Alert/Oriented   Current Functional Status: Independent   Lives With spouse   Able to Return to Prior Arrangements yes   Is patient able to care for self after discharge? Yes   Readmission Within The Last 30 Days no previous admission in last 30 days   Equipment Currently Used at Home none   Do you have any problems affording any of your prescribed medications? TBD   Transportation Available none   Dialysis Name and Scheduled days N/A   Does the patient receive services at the Coumadin Clinic? No   Discharge Plan A Home with family   Discharge Plan B Home with family;Home Health   Patient/Family In Agreement With Plan yes     Lawanda Solano RN  Transition Navigator  [phone]

## 2018-06-04 NOTE — PT/OT/SLP PROGRESS
Physical Therapy  Missed Visit Note    Patient Name:  Romy Monae   MRN:  [id number]    Attempted to see pt at 1003. Patient not seen today secondary to Nursing care. Will follow-up as able.    Andrew Tran, PT, DPT

## 2018-06-05 NOTE — DISCHARGE SUMMARY
[facility] Discharge Summary    Primary Team: [facility]  Attending Physician: Dr. Pipo Gold  Resident: Francisco Tanner  Intern: Ray Black    Date of Admit: 6/3/2018  Date of Discharge: 6/4/2018    Discharge to: Home  Condition: Stable    Discharge Diagnoses     Patient Active Problem List   Diagnosis    Pulmonary embolism    GERD (gastroesophageal reflux disease)    Shortness of breath    Hyperlipidemia    Pulmonary hypertension       Consultants and Procedures     Consultants:  PT/OT    Procedures:   None    Brief History of Present Illness      Romy Monae is a 67 y.o.  female who  has a past medical history of Fibula fracture.  The patient presented to [facility] on 6/3/2018 with a primary complaint of Shortness of Breath (c/o SOB since Wednesday, worse on exertion. )  .     Patient presented with 5 day history of progressive SOB with exertion. She reports history of saddle PE with DVT in 2011, and was treated with Warfarin for roughly a year. She also had R fibular fracture in 6/2017 in which she remained immobile for 4 months (was treated with  mg daily at that period). Otherwise, patient denies any chest pain, palpitation, recent history of travel, trauma/injury, or history of cancer.     For the full HPI please refer to the History & Physical from this admission.    Hospital Course By Problem with Pertinent Findings     Venous Thromboembolism   - History of Saddle PE, L popliteal DVT in 2/2011. Was treated with Warfarin for about 1 year. Patient also reports h/o DVT at that time. LE US in 2014 with no clots.   - Reports 5 days h/o gradual onset progressive SOB on exertion. Similar to h/o PE in the past.   - CTA with extensive bilateral pulmonary emboli. LE US with L non-occlusive DVT. EKG with sinus tachycardia.  - Full dose lovenox loaded for anticoagulation. Switched to Eliquis.  - Initial PESI score of 89 (intermediate risk). HASXAVIER  score of 1 (age).  - SOB resolved. Stable on room air upon discharge. Does not meet criteria for home O2 - mild dyspnea with ambulation but with SpO2 98%.   - Patient will likely benefit from long term anticoagulation. Risk and benefits of anticoagulation discussed with patient and family. Discharged home with Eliquis.  - Also will refer to heme-onc given second episode of apparently unprovoked PE and +family history of PE.     Pulmonary Hypertension  - Revealed on echo  - Likely associated with VTE  - Will refer patient to pulmonary hypertension clinic.      Dyslipidemia  - Non fasting lipid panel with TC mildly elevated 203 and borderline .  - Calculated ASCVD score of 14.2%. Likely benefit from high intensity statin.   - Added Lipitor 20 mg daily. Counseled on diet and lifestyle modification.      History of glaucoma  - No acute issues.  - Continue home latanoprost eye drip.      Obesity  - Body mass index is 33.28 kg/m².  - Counseled on healthy diet and lifestyle.   - a1c ordered but pending upon discharge.       History of AoCD  - Previous diagnosis documented in 2011.   - H/H stable and wnl.   - Colonoscopy in 12/2011 with diverticula but no polyps.     Health Maintenance  - UTD with influenza and PNA. Not sure about Tdap.  - UTD with colonoscopy and mammogram. Not sure about PAP per patient.  - DeXA wnl in 12/2017  - PCP is Dr. Rony Mayfield.    Discharge Medications        Medication List      START taking these medications    * apixaban 5 mg Tab  Take 2 tablets (10 mg total) by mouth 2 (two) times daily.     * apixaban 5 mg Tab  Take 1 tablet (5 mg total) by mouth 2 (two) times daily.     * ELIQUIS 5 mg Tab  Generic drug:  apixaban  TAKE 2 TABLETS (10 MG TOTAL) BY MOUTH TWO TIMES DAILY FOR 7 DAYS, THEN TAKE ONE TABLET (5 MG TOTAL) BY MOUTH TWO TIMES A DAY THEREAFTER     atorvastatin 20 MG tablet  Commonly known as:  LIPITOR  Take 1 tablet (20 mg total) by mouth once daily.     famotidine 20 MG  tablet  Commonly known as:  PEPCID  Take 1 tablet (20 mg total) by mouth 2 (two) times daily.        * This list has 3 medication(s) that are the same as other medications prescribed for you. Read the directions carefully, and ask your doctor or other care provider to review them with you.            CONTINUE taking these medications    latanoprost 0.005 % ophthalmic solution           Where to Get Your Medications      These medications were sent to [facility]  [address], [place] [address]    Hours:  Mon-Fri, 8a-5:30p Phone:  [phone]   · atorvastatin 20 MG tablet  · ELIQUIS 5 mg Tab  · famotidine 20 MG tablet     You can get these medications from any pharmacy    Bring a paper prescription for each of these medications  · apixaban 5 mg Tab  · apixaban 5 mg Tab         Discharge Information:   Diet:  Heart Healthy    Physical Activity:  No restriction, as tolerated.     Instructions:  1. Take all medications as prescribed  2. Keep all follow-up appointments  3. Return to the hospital or call your primary care physicians if any worsening symptoms such as worsening shortness of breath, chest pain, palpitation, leg swelling, or any other acute concerns occur.      Follow-Up Appointments:  Future Appointments  Date Time Provider Department Center   6/19/2018 1:40 PM Indio Brito MD [facility] HEM ONC [place]  \[facility] [place] [facility]\"" Internal Medicine, HO-1  \[facility] [place] [facility]\"" Internal Medicine Team B

## 2018-06-19 ENCOUNTER — INITIAL CONSULT (OUTPATIENT)
Dept: HEMATOLOGY/ONCOLOGY | Facility: CLINIC | Age: 68
End: 2018-06-19
Payer: MEDICARE

## 2018-06-19 VITALS
HEART RATE: 69 BPM | TEMPERATURE: 98 F | WEIGHT: 201.94 LBS | HEIGHT: 65 IN | BODY MASS INDEX: 33.65 KG/M2 | SYSTOLIC BLOOD PRESSURE: 147 MMHG | DIASTOLIC BLOOD PRESSURE: 74 MMHG

## 2018-06-19 DIAGNOSIS — R06.02 SHORTNESS OF BREATH: ICD-10-CM

## 2018-06-19 DIAGNOSIS — I26.99 OTHER ACUTE PULMONARY EMBOLISM WITHOUT ACUTE COR PULMONALE: Primary | ICD-10-CM

## 2018-06-19 DIAGNOSIS — I82.412 DVT OF DEEP FEMORAL VEIN, LEFT: ICD-10-CM

## 2018-06-19 PROCEDURE — 99999 PR PBB SHADOW E&M-EST. PATIENT-LVL III: CPT | Mod: PBBFAC,,, | Performed by: INTERNAL MEDICINE

## 2018-06-19 PROCEDURE — 99205 OFFICE O/P NEW HI 60 MIN: CPT | Mod: S$GLB,,, | Performed by: INTERNAL MEDICINE

## 2018-06-19 NOTE — PROGRESS NOTES
Subjective:       Patient ID: Romy Monae is a 67 y.o. female.    Chief Complaint: No chief complaint on file.    HPI  HISTORY OF PRESENT ILLNESS:  Ms. Monae is a pleasant 67-year-old female referred for recurrent pulmonary embolism.  She presented to the ED on 06/03/2018 with dyspnea of less than one week's duration that is progressively worsening without any clear precipitating factor and a CT chest showed her to have extensive bilateral acute-appearing pulmonary emboli involving segmental and subsegmental pulmonary arteries throughout all bilateral lobes.  Lower extremity ultrasound revealed acute nonocclusive DVT in the left common femoral vein.  She was placed on anticoagulation with Eliquis and sent home.  No known precipitating or risk factors for this episode.    She had a prior history of DVT and saddle pulmonary embolism in February 2011 without any precipitating episode at that time as well.  She was treated with warfarin for a year.    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    She currently sees Dr. Mayfield, her primary care physician.    She did have a lot of difficulty breathing when she presented, that is better, but it is still there.  No chest pain or hemoptysis.    Review of Systems   Constitutional: Negative for fever and unexpected weight change.   HENT: Negative for mouth sores and nosebleeds.    Eyes: Negative for photophobia and pain.   Respiratory: Positive for shortness of breath. Negative for wheezing.    Cardiovascular: Negative for chest pain and palpitations.   Gastrointestinal: Negative for abdominal pain and vomiting.   Genitourinary: Negative for flank pain and hematuria.   Musculoskeletal: Negative for neck pain and neck stiffness.   Skin: Negative for rash and wound.   Neurological: Negative for seizures and syncope.   Hematological: Negative for  adenopathy. Does not bruise/bleed easily.   Psychiatric/Behavioral: Negative for behavioral problems and confusion.   All other systems reviewed and are negative.        Objective:      Physical Exam   Constitutional: She is cooperative. She does not appear ill. No distress.   HENT:   Head: Head is without laceration, without right periorbital erythema and without left periorbital erythema.   Nose: No epistaxis.   Mouth/Throat: Oropharynx is clear and moist. No oropharyngeal exudate. No tonsillar exudate.   Eyes: Conjunctivae are normal.   Neck: Neck supple. No thyroid mass and no thyromegaly present.   Cardiovascular: Normal rate and regular rhythm.  Exam reveals no friction rub.    Pulmonary/Chest: Breath sounds normal. No accessory muscle usage or stridor. No tachypnea. No respiratory distress. Chest wall is not dull to percussion. She exhibits no tenderness.   Abdominal: Soft. She exhibits no distension, no ascites and no mass. There is no hepatosplenomegaly.   Musculoskeletal: She exhibits no edema.   Lymphadenopathy:        Head (right side): No submental and no submandibular adenopathy present.        Head (left side): No submental and no submandibular adenopathy present.     She has no cervical adenopathy.     She has no axillary adenopathy.   Neurological: She is alert. She has normal strength. No cranial nerve deficit.   Skin: No bruising, no petechiae and no rash noted. She is not diaphoretic.   Psychiatric: Her behavior is normal. Thought content normal. Cognition and memory are normal. She does not exhibit a depressed mood.   Vitals reviewed.        Assessment:       1. Other acute pulmonary embolism without acute cor pulmonale    2. Shortness of breath    3. DVT of deep femoral vein, left        Plan:   Ms. Monae has had two episodes of extensive PE.  Both of them unprovoked.  She clearly has a hypercoagulable state.  She needs indefinite anticoagulation.    She is currently tolerating Eliquis well.   She will continue 5 mg twice daily dose indefinitely.    We will hold off on repeating her hypercoagulable workup.  She already is known to have antithrombin III deficiency.    We will check blood work, chemistries, D-dimer and CT chest PE protocol and see her back in the clinic for followup in six months to monitor her for resolution of her PE.  She knows to call me if her breathing worsens in the interim.  All questions answered.

## 2018-12-18 ENCOUNTER — HOSPITAL ENCOUNTER (OUTPATIENT)
Dept: RADIOLOGY | Facility: HOSPITAL | Age: 68
Discharge: HOME | End: 2018-12-18
Attending: INTERNAL MEDICINE
Payer: MEDICARE

## 2018-12-18 DIAGNOSIS — I26.99 OTHER ACUTE PULMONARY EMBOLISM WITHOUT ACUTE COR PULMONALE: ICD-10-CM

## 2018-12-18 PROCEDURE — 71275 CT ANGIOGRAPHY CHEST: CPT | Mod: TC

## 2018-12-18 PROCEDURE — 71275 CT ANGIOGRAPHY CHEST: CPT | Mod: 26,,, | Performed by: RADIOLOGY

## 2018-12-18 PROCEDURE — 25500020 PHARM REV CODE 255: Performed by: INTERNAL MEDICINE

## 2018-12-18 RX ADMIN — IOHEXOL 100 ML: 350 INJECTION, SOLUTION INTRAVENOUS at 10:12

## 2018-12-20 ENCOUNTER — OFFICE VISIT (OUTPATIENT)
Dept: HEMATOLOGY/ONCOLOGY | Facility: CLINIC | Age: 68
End: 2018-12-20
Payer: MEDICARE

## 2018-12-20 VITALS
WEIGHT: 203.69 LBS | RESPIRATION RATE: 16 BRPM | TEMPERATURE: 98 F | OXYGEN SATURATION: 97 % | SYSTOLIC BLOOD PRESSURE: 144 MMHG | HEART RATE: 70 BPM | BODY MASS INDEX: 33.9 KG/M2 | DIASTOLIC BLOOD PRESSURE: 66 MMHG

## 2018-12-20 DIAGNOSIS — D68.59 ANTITHROMBIN III DEFICIENCY: ICD-10-CM

## 2018-12-20 DIAGNOSIS — I26.99 OTHER ACUTE PULMONARY EMBOLISM WITHOUT ACUTE COR PULMONALE: Primary | ICD-10-CM

## 2018-12-20 DIAGNOSIS — I82.412 DVT OF DEEP FEMORAL VEIN, LEFT: ICD-10-CM

## 2018-12-20 DIAGNOSIS — D68.59 HYPERCOAGULABLE STATE, PRIMARY: ICD-10-CM

## 2018-12-20 PROBLEM — R06.02 SHORTNESS OF BREATH: Status: RESOLVED | Noted: 2018-06-03 | Resolved: 2018-12-20

## 2018-12-20 PROCEDURE — 1101F PT FALLS ASSESS-DOCD LE1/YR: CPT | Mod: CPTII,S$GLB,, | Performed by: INTERNAL MEDICINE

## 2018-12-20 PROCEDURE — 99213 OFFICE O/P EST LOW 20 MIN: CPT | Mod: S$GLB,,, | Performed by: INTERNAL MEDICINE

## 2018-12-20 PROCEDURE — 99999 PR PBB SHADOW E&M-EST. PATIENT-LVL III: CPT | Mod: PBBFAC,,, | Performed by: INTERNAL MEDICINE

## 2018-12-20 NOTE — PROGRESS NOTES
Subjective:       Patient ID: Romy Monae is a 68 y.o. female.    Chief Complaint: pulmonary embolism    HPI  HISTORY OF PRESENT ILLNESS:  Ms. Monae is a pleasant 67-year-old female referred for recurrent pulmonary embolism.  She presented to the ED on 06/03/2018 with dyspnea of less than one week's duration that is progressively worsening without any clear precipitating factor and a CT chest showed her to have extensive bilateral acute-appearing pulmonary emboli involving segmental and subsegmental pulmonary arteries throughout all bilateral lobes.  Lower extremity ultrasound revealed acute nonocclusive DVT in the left common femoral vein.  She was placed on anticoagulation with Eliquis and sent home.  No known precipitating or risk factors for this episode.    She had a prior history of DVT and saddle pulmonary embolism in February 2011 without any precipitating episode at that time as well.  She was treated with warfarin for a year.    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    She currently sees Dr. Mayfield, her primary care physician.    Review of Systems   Constitutional: Negative for fever and unexpected weight change.   HENT: Negative for mouth sores and nosebleeds.    Eyes: Negative for photophobia and pain.   Respiratory: Negative for shortness of breath and wheezing.    Cardiovascular: Negative for chest pain and palpitations.   Gastrointestinal: Negative for abdominal pain and vomiting.   Genitourinary: Negative for flank pain and hematuria.   Musculoskeletal: Negative for neck pain and neck stiffness.   Skin: Negative for rash and wound.   Neurological: Negative for seizures and syncope.   Hematological: Negative for adenopathy. Does not bruise/bleed easily.   Psychiatric/Behavioral: Negative for behavioral problems and confusion.   All other systems reviewed and are  negative.        Objective:      Physical Exam   Constitutional: She is cooperative. She does not appear ill. No distress.   HENT:   Head: Head is without laceration, without right periorbital erythema and without left periorbital erythema.   Nose: No epistaxis.   Mouth/Throat: Oropharynx is clear and moist. No oropharyngeal exudate. No tonsillar exudate.   Eyes: Conjunctivae are normal.   Neck: Neck supple. No thyroid mass and no thyromegaly present.   Cardiovascular: Normal rate and regular rhythm. Exam reveals no friction rub.   Pulmonary/Chest: Breath sounds normal. No accessory muscle usage or stridor. No tachypnea. No respiratory distress. Chest wall is not dull to percussion. She exhibits no tenderness.   Abdominal: Soft. She exhibits no distension, no ascites and no mass. There is no hepatosplenomegaly.   Musculoskeletal: She exhibits no edema.   Lymphadenopathy:        Head (right side): No submental and no submandibular adenopathy present.        Head (left side): No submental and no submandibular adenopathy present.     She has no cervical adenopathy.     She has no axillary adenopathy.   Neurological: She is alert. She has normal strength. No cranial nerve deficit.   Skin: No bruising, no petechiae and no rash noted. She is not diaphoretic.   Psychiatric: Her behavior is normal. Thought content normal. Cognition and memory are normal. She does not exhibit a depressed mood.   Vitals reviewed.        Assessment:       1. Other acute pulmonary embolism without acute cor pulmonale    2. DVT of deep femoral vein, left    3. Hypercoagulable state, primary    4. Antithrombin III deficiency        Plan:   Ms. Monae has had two episodes of extensive PE.  Both of them unprovoked.  She clearly has a hypercoagulable state 2/2 to AntiThrombin III deficiency    She needs indefinite anticoagulation.    Recent CT Chest shows resolution of PE    D-dimer WNL    She is currently tolerating Eliquis well.  She will  continue 5 mg twice daily dose indefinitely.    F/u 6 mo

## 2019-06-24 ENCOUNTER — TELEPHONE (OUTPATIENT)
Dept: HEMATOLOGY/ONCOLOGY | Facility: CLINIC | Age: 69
End: 2019-06-24

## 2019-06-24 NOTE — TELEPHONE ENCOUNTER
Pt requesting lab orders for [facility]. Orders printed, pt will  tomorrow.     ----- Message from [place]ola[place] sent at 6/24/2019  9:53 AM CDT -----  Contact: Self/ [phone]  Patient asked to speak with you about her labs. She needs her labs faxed to [facility].    Please call.

## 2019-06-26 LAB
ALBUMIN SERPL-MCNC: 4 G/DL (ref 3.6–5.1)
ALBUMIN/GLOB SERPL: 1.5 (CALC) (ref 1–2.5)
ALP SERPL-CCNC: 75 U/L (ref 33–130)
ALT SERPL-CCNC: 23 U/L (ref 6–29)
AST SERPL-CCNC: 22 U/L (ref 10–35)
BILIRUB SERPL-MCNC: 0.4 MG/DL (ref 0.2–1.2)
BUN SERPL-MCNC: 16 MG/DL (ref 7–25)
BUN/CREAT SERPL: NORMAL (CALC) (ref 6–22)
CALCIUM SERPL-MCNC: 9.2 MG/DL (ref 8.6–10.4)
CHLORIDE SERPL-SCNC: 104 MMOL/L (ref 98–110)
CO2 SERPL-SCNC: 28 MMOL/L (ref 20–32)
CREAT SERPL-MCNC: 0.83 MG/DL (ref 0.6–0.93)
D DIMER PPP FEU-MCNC: 0.39 MCG/ML FEU
ERYTHROCYTE [DISTWIDTH] IN BLOOD BY AUTOMATED COUNT: 12.9 % (ref 11–15)
GFRSERPLBLD MDRD-ARVRAT: 71 ML/MIN/1.73M2
GLOBULIN SER CALC-MCNC: 2.7 G/DL (CALC) (ref 1.9–3.7)
GLUCOSE SERPL-MCNC: 92 MG/DL (ref 65–99)
HCT VFR BLD AUTO: 40 % (ref 35–45)
HGB BLD-MCNC: 13.5 G/DL (ref 11.7–15.5)
MCH RBC QN AUTO: 29.7 PG (ref 27–33)
MCHC RBC AUTO-ENTMCNC: 33.8 G/DL (ref 32–36)
MCV RBC AUTO: 87.9 FL (ref 80–100)
PLATELET # BLD AUTO: 280 THOUSAND/UL (ref 140–400)
PMV BLD REES-ECKER: 11.4 FL (ref 7.5–12.5)
POTASSIUM SERPL-SCNC: 4.8 MMOL/L (ref 3.5–5.3)
PROT SERPL-MCNC: 6.7 G/DL (ref 6.1–8.1)
RBC # BLD AUTO: 4.55 MILLION/UL (ref 3.8–5.1)
SODIUM SERPL-SCNC: 141 MMOL/L (ref 135–146)
WBC # BLD AUTO: 7.7 THOUSAND/UL (ref 3.8–10.8)

## 2019-07-01 ENCOUNTER — TELEPHONE (OUTPATIENT)
Dept: HEMATOLOGY/ONCOLOGY | Facility: CLINIC | Age: 69
End: 2019-07-01

## 2019-10-15 ENCOUNTER — TELEPHONE (OUTPATIENT)
Dept: HEMATOLOGY/ONCOLOGY | Facility: CLINIC | Age: 69
End: 2019-10-15

## 2019-10-15 NOTE — TELEPHONE ENCOUNTER
Pt inquiring about free samples of Eliquis. This nurse informed pt that [employer] cards and 30 day free cards would be left for her at the desk to  in the morning. Pt offered to have appt made for December, but pt states she does not know her schedule, so she will call this nurse back when she knows what dates will work for her schedule.     ----- Message from Peace Pereira sent at 10/15/2019  4:40 PM CDT -----  Contact: [phone]/self  Patient is requesting a call back regarding getting samples of Eliquis 5Mg . She has reached the donut hole with this rx. thanks

## 2019-12-09 ENCOUNTER — TELEPHONE (OUTPATIENT)
Dept: HEMATOLOGY/ONCOLOGY | Facility: CLINIC | Age: 69
End: 2019-12-09

## 2019-12-09 DIAGNOSIS — I26.99 OTHER ACUTE PULMONARY EMBOLISM WITHOUT ACUTE COR PULMONALE: Primary | ICD-10-CM

## 2019-12-09 NOTE — TELEPHONE ENCOUNTER
Informed pt of orders for [facility]. Pt states she will pick orders up tomorrow and take them to [facility]. Pt f/up appt is scheduled for 12/19. Pt confirmed.     ----- Message from Linda Chris sent at 12/9/2019  8:44 AM CST -----  Contact: [phone]/self  Patient is requesting external  orders for blood work. Sent to [facility]. Please advise.

## 2019-12-19 ENCOUNTER — OFFICE VISIT (OUTPATIENT)
Dept: HEMATOLOGY/ONCOLOGY | Facility: CLINIC | Age: 69
End: 2019-12-19
Payer: MEDICARE

## 2019-12-19 VITALS
SYSTOLIC BLOOD PRESSURE: 150 MMHG | WEIGHT: 205.94 LBS | DIASTOLIC BLOOD PRESSURE: 77 MMHG | BODY MASS INDEX: 34.27 KG/M2 | OXYGEN SATURATION: 98 % | HEART RATE: 66 BPM | TEMPERATURE: 97 F | RESPIRATION RATE: 18 BRPM

## 2019-12-19 DIAGNOSIS — D68.59 ANTITHROMBIN III DEFICIENCY: ICD-10-CM

## 2019-12-19 DIAGNOSIS — D68.59 HYPERCOAGULABLE STATE, PRIMARY: ICD-10-CM

## 2019-12-19 DIAGNOSIS — I26.99 OTHER ACUTE PULMONARY EMBOLISM WITHOUT ACUTE COR PULMONALE: Primary | ICD-10-CM

## 2019-12-19 PROCEDURE — 1101F PR PT FALLS ASSESS DOC 0-1 FALLS W/OUT INJ PAST YR: ICD-10-PCS | Mod: CPTII,S$GLB,, | Performed by: INTERNAL MEDICINE

## 2019-12-19 PROCEDURE — 1126F AMNT PAIN NOTED NONE PRSNT: CPT | Mod: S$GLB,,, | Performed by: INTERNAL MEDICINE

## 2019-12-19 PROCEDURE — 99999 PR PBB SHADOW E&M-EST. PATIENT-LVL III: ICD-10-PCS | Mod: PBBFAC,,, | Performed by: INTERNAL MEDICINE

## 2019-12-19 PROCEDURE — 99213 OFFICE O/P EST LOW 20 MIN: CPT | Mod: S$GLB,,, | Performed by: INTERNAL MEDICINE

## 2019-12-19 PROCEDURE — 1101F PT FALLS ASSESS-DOCD LE1/YR: CPT | Mod: CPTII,S$GLB,, | Performed by: INTERNAL MEDICINE

## 2019-12-19 PROCEDURE — 99213 PR OFFICE/OUTPT VISIT, EST, LEVL III, 20-29 MIN: ICD-10-PCS | Mod: S$GLB,,, | Performed by: INTERNAL MEDICINE

## 2019-12-19 PROCEDURE — 1126F PR PAIN SEVERITY QUANTIFIED, NO PAIN PRESENT: ICD-10-PCS | Mod: S$GLB,,, | Performed by: INTERNAL MEDICINE

## 2019-12-19 PROCEDURE — 99999 PR PBB SHADOW E&M-EST. PATIENT-LVL III: CPT | Mod: PBBFAC,,, | Performed by: INTERNAL MEDICINE

## 2019-12-19 PROCEDURE — 1159F PR MEDICATION LIST DOCUMENTED IN MEDICAL RECORD: ICD-10-PCS | Mod: S$GLB,,, | Performed by: INTERNAL MEDICINE

## 2019-12-19 PROCEDURE — 1159F MED LIST DOCD IN RCRD: CPT | Mod: S$GLB,,, | Performed by: INTERNAL MEDICINE

## 2019-12-19 NOTE — PROGRESS NOTES
Subjective:       Patient ID: Romy Monae is a 69 y.o. female.    Chief Complaint: pulmonary embolism    HPI  HISTORY OF PRESENT ILLNESS:  Ms. Monae is a pleasant 67-year-old female referred for recurrent pulmonary embolism.  She presented to the ED on 06/03/2018 with dyspnea of less than one week's duration that is progressively worsening without any clear precipitating factor and a CT chest showed her to have extensive bilateral acute-appearing pulmonary emboli involving segmental and subsegmental pulmonary arteries throughout all bilateral lobes.  Lower extremity ultrasound revealed acute nonocclusive DVT in the left common femoral vein.  She was placed on anticoagulation with Eliquis and sent home.  No known precipitating or risk factors for this episode.    She had a prior history of DVT and saddle pulmonary embolism in February 2011 without any precipitating episode at that time as well.  She was treated with warfarin for a year.    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    She currently sees Dr. Mayfield, her primary care physician.    Review of Systems   Constitutional: Negative for fever and unexpected weight change.   HENT: Negative for mouth sores and nosebleeds.    Eyes: Negative for photophobia and pain.   Respiratory: Negative for shortness of breath and wheezing.    Cardiovascular: Negative for chest pain and palpitations.   Gastrointestinal: Negative for abdominal pain and vomiting.   Genitourinary: Negative for flank pain and hematuria.   Musculoskeletal: Negative for neck pain and neck stiffness.   Skin: Negative for rash and wound.   Neurological: Negative for seizures and syncope.   Hematological: Negative for adenopathy. Does not bruise/bleed easily.   Psychiatric/Behavioral: Negative for behavioral problems and confusion.   All other systems reviewed and are  negative.        Objective:      Physical Exam   Constitutional: She is cooperative. She does not appear ill. No distress.   HENT:   Head: Head is without laceration, without right periorbital erythema and without left periorbital erythema.   Nose: No epistaxis.   Mouth/Throat: Oropharynx is clear and moist. No oropharyngeal exudate. No tonsillar exudate.   Eyes: Conjunctivae are normal.   Neck: Neck supple. No thyroid mass and no thyromegaly present.   Cardiovascular: Normal rate and regular rhythm. Exam reveals no friction rub.   Pulmonary/Chest: Breath sounds normal. No accessory muscle usage or stridor. No tachypnea. No respiratory distress. Chest wall is not dull to percussion. She exhibits no tenderness.   Abdominal: Soft. She exhibits no distension, no ascites and no mass. There is no hepatosplenomegaly.   Musculoskeletal: She exhibits no edema.   Lymphadenopathy:        Head (right side): No submental and no submandibular adenopathy present.        Head (left side): No submental and no submandibular adenopathy present.     She has no cervical adenopathy.     She has no axillary adenopathy.   Neurological: She is alert. She has normal strength. No cranial nerve deficit.   Skin: No bruising, no petechiae and no rash noted. She is not diaphoretic.   Psychiatric: Her behavior is normal. Thought content normal. Cognition and memory are normal. She does not exhibit a depressed mood.   Vitals reviewed.        Assessment:       1. Other acute pulmonary embolism without acute cor pulmonale    2. Hypercoagulable state, primary    3. Antithrombin III deficiency        Plan:   Ms. Monae has had two episodes of extensive PE.  Both of them unprovoked.  She clearly has a hypercoagulable state 2/2 to AntiThrombin III deficiency    She needs indefinite anticoagulation.    She is currently tolerating Eliquis well.  She will continue 5 mg twice daily dose indefinitely.    F/u with  - needs CBC, CMP every 6  months    RTC prn

## 2020-06-02 ENCOUNTER — TELEPHONE (OUTPATIENT)
Dept: PHARMACY | Facility: CLINIC | Age: 70
End: 2020-06-02

## 2020-06-02 RX ORDER — APIXABAN 5 MG/1
TABLET, FILM COATED ORAL
Qty: 60 TABLET | Refills: 3 | Status: SHIPPED | OUTPATIENT
Start: 2020-06-02 | End: 2020-06-02

## 2020-06-02 RX ORDER — APIXABAN 5 MG (74)
KIT ORAL
COMMUNITY
Start: 2020-04-03 | End: 2020-06-02 | Stop reason: SDUPTHER

## 2021-03-01 ENCOUNTER — TELEPHONE (OUTPATIENT)
Dept: RHEUMATOLOGY | Facility: CLINIC | Age: 71
End: 2021-03-01

## 2021-03-09 ENCOUNTER — OFFICE VISIT (OUTPATIENT)
Dept: RHEUMATOLOGY | Facility: CLINIC | Age: 71
End: 2021-03-09
Payer: MEDICARE

## 2021-03-09 VITALS
OXYGEN SATURATION: 99 % | WEIGHT: 192 LBS | SYSTOLIC BLOOD PRESSURE: 171 MMHG | BODY MASS INDEX: 31.99 KG/M2 | DIASTOLIC BLOOD PRESSURE: 67 MMHG | TEMPERATURE: 97 F | HEIGHT: 65 IN | HEART RATE: 68 BPM

## 2021-03-09 DIAGNOSIS — M65.311 TRIGGER FINGER OF RIGHT THUMB: Primary | ICD-10-CM

## 2021-03-09 DIAGNOSIS — M19.041 PRIMARY OSTEOARTHRITIS OF BOTH HANDS: ICD-10-CM

## 2021-03-09 DIAGNOSIS — M19.042 PRIMARY OSTEOARTHRITIS OF BOTH HANDS: ICD-10-CM

## 2021-03-09 DIAGNOSIS — I10 HYPERTENSION, UNSPECIFIED TYPE: ICD-10-CM

## 2021-03-09 DIAGNOSIS — Z79.01 CURRENT USE OF LONG TERM ANTICOAGULATION: ICD-10-CM

## 2021-03-09 PROCEDURE — 3008F BODY MASS INDEX DOCD: CPT | Mod: CPTII,S$GLB,, | Performed by: INTERNAL MEDICINE

## 2021-03-09 PROCEDURE — 1159F PR MEDICATION LIST DOCUMENTED IN MEDICAL RECORD: ICD-10-PCS | Mod: S$GLB,,, | Performed by: INTERNAL MEDICINE

## 2021-03-09 PROCEDURE — 3008F PR BODY MASS INDEX (BMI) DOCUMENTED: ICD-10-PCS | Mod: CPTII,S$GLB,, | Performed by: INTERNAL MEDICINE

## 2021-03-09 PROCEDURE — 99999 PR PBB SHADOW E&M-EST. PATIENT-LVL IV: ICD-10-PCS | Mod: PBBFAC,,, | Performed by: INTERNAL MEDICINE

## 2021-03-09 PROCEDURE — 99204 PR OFFICE/OUTPT VISIT, NEW, LEVL IV, 45-59 MIN: ICD-10-PCS | Mod: S$GLB,,, | Performed by: INTERNAL MEDICINE

## 2021-03-09 PROCEDURE — 3288F FALL RISK ASSESSMENT DOCD: CPT | Mod: CPTII,S$GLB,, | Performed by: INTERNAL MEDICINE

## 2021-03-09 PROCEDURE — 1159F MED LIST DOCD IN RCRD: CPT | Mod: S$GLB,,, | Performed by: INTERNAL MEDICINE

## 2021-03-09 PROCEDURE — 3288F PR FALLS RISK ASSESSMENT DOCUMENTED: ICD-10-PCS | Mod: CPTII,S$GLB,, | Performed by: INTERNAL MEDICINE

## 2021-03-09 PROCEDURE — 1101F PT FALLS ASSESS-DOCD LE1/YR: CPT | Mod: CPTII,S$GLB,, | Performed by: INTERNAL MEDICINE

## 2021-03-09 PROCEDURE — 99204 OFFICE O/P NEW MOD 45 MIN: CPT | Mod: S$GLB,,, | Performed by: INTERNAL MEDICINE

## 2021-03-09 PROCEDURE — 1101F PR PT FALLS ASSESS DOC 0-1 FALLS W/OUT INJ PAST YR: ICD-10-PCS | Mod: CPTII,S$GLB,, | Performed by: INTERNAL MEDICINE

## 2021-03-09 PROCEDURE — 99999 PR PBB SHADOW E&M-EST. PATIENT-LVL IV: CPT | Mod: PBBFAC,,, | Performed by: INTERNAL MEDICINE

## 2021-03-09 RX ORDER — PREDNISOLONE ACETATE 10 MG/ML
SUSPENSION/ DROPS OPHTHALMIC
COMMUNITY
Start: 2020-12-15 | End: 2021-12-14

## 2021-03-17 ENCOUNTER — HOSPITAL ENCOUNTER (OUTPATIENT)
Dept: RADIOLOGY | Facility: HOSPITAL | Age: 71
Discharge: HOME | End: 2021-03-17
Attending: INTERNAL MEDICINE
Payer: MEDICARE

## 2021-03-17 DIAGNOSIS — M19.042 PRIMARY OSTEOARTHRITIS OF BOTH HANDS: ICD-10-CM

## 2021-03-17 DIAGNOSIS — M19.041 PRIMARY OSTEOARTHRITIS OF BOTH HANDS: ICD-10-CM

## 2021-03-17 PROCEDURE — 73130 X-RAY EXAM OF HAND: CPT | Mod: TC,50,FY

## 2021-03-17 PROCEDURE — 73130 X-RAY EXAM OF HAND: CPT | Mod: 26,50,, | Performed by: RADIOLOGY

## 2021-03-17 PROCEDURE — 73130 XR HAND COMPLETE 3 VIEWS BILATERAL: ICD-10-PCS | Mod: 26,50,, | Performed by: RADIOLOGY

## 2021-03-19 LAB
CCP IGG SERPL-ACNC: <16 UNITS
RHEUMATOID FACT SERPL-ACNC: <14 IU/ML

## 2021-04-15 ENCOUNTER — PATIENT MESSAGE (OUTPATIENT)
Dept: RESEARCH | Facility: HOSPITAL | Age: 71
End: 2021-04-15

## 2022-09-29 ENCOUNTER — HOSPITAL ENCOUNTER (OUTPATIENT)
Facility: HOSPITAL | Age: 72
Discharge: HOME | End: 2022-09-30
Attending: STUDENT IN AN ORGANIZED HEALTH CARE EDUCATION/TRAINING PROGRAM | Admitting: STUDENT IN AN ORGANIZED HEALTH CARE EDUCATION/TRAINING PROGRAM
Payer: MEDICARE

## 2022-09-29 DIAGNOSIS — Z86.718 HISTORY OF DVT (DEEP VEIN THROMBOSIS): ICD-10-CM

## 2022-09-29 DIAGNOSIS — Z95.5 S/P DRUG ELUTING CORONARY STENT PLACEMENT: ICD-10-CM

## 2022-09-29 DIAGNOSIS — R07.9 CHEST PAIN: ICD-10-CM

## 2022-09-29 DIAGNOSIS — R06.02 SHORTNESS OF BREATH: ICD-10-CM

## 2022-09-29 DIAGNOSIS — R06.02 SOB (SHORTNESS OF BREATH): ICD-10-CM

## 2022-09-29 DIAGNOSIS — I21.4 NSTEMI (NON-ST ELEVATED MYOCARDIAL INFARCTION): Primary | ICD-10-CM

## 2022-09-29 PROCEDURE — 93010 ELECTROCARDIOGRAM REPORT: CPT | Mod: ,,, | Performed by: INTERNAL MEDICINE

## 2022-09-29 PROCEDURE — 93010 EKG 12-LEAD: ICD-10-PCS | Mod: ,,, | Performed by: INTERNAL MEDICINE

## 2022-09-29 PROCEDURE — 93005 ELECTROCARDIOGRAM TRACING: CPT

## 2022-09-29 PROCEDURE — 99285 EMERGENCY DEPT VISIT HI MDM: CPT | Mod: 25

## 2022-09-29 NOTE — Clinical Note
150 ml of contrast were injected throughout the case. 50 mL of contrast was the total wasted during the case. 200 mL was the total amount used during the case.

## 2022-09-29 NOTE — Clinical Note
An angiography was performed of the left coronary arteries. The angiography was performed via hand injection with 10 mL of contrast.

## 2022-09-29 NOTE — Clinical Note
The catheter was inserted into the ostium   right coronary artery. An angiography was performed of the right coronary arteries. Multiple views were taken. The angiography was performed via hand injection with 10 mL of contrast.

## 2022-09-29 NOTE — Clinical Note
Vaccine Information Statement(s) or the Emergency Use Authorization was given today. This has been reviewed, questions answered, and verbal consent given by Patient for injection(s) and administration of COVID-19 Immunization Moderna COVID-19.  [facility] dispensed vaccine administered.    Patient tolerated without incident. See immunization grid for documentation.         The wire was inserted into the distal left anterior descending artery.

## 2022-09-29 NOTE — Clinical Note
The catheter was inserted into the ostium   left main. An angiography was performed of the left coronary arteries. The angiography was performed via hand injection with 4 mL of contrast.

## 2022-09-30 ENCOUNTER — TELEPHONE (OUTPATIENT)
Dept: CARDIAC REHAB | Facility: CLINIC | Age: 72
End: 2022-09-30
Payer: MEDICARE

## 2022-09-30 VITALS
WEIGHT: 195 LBS | RESPIRATION RATE: 18 BRPM | HEIGHT: 65 IN | SYSTOLIC BLOOD PRESSURE: 143 MMHG | DIASTOLIC BLOOD PRESSURE: 67 MMHG | TEMPERATURE: 98 F | HEART RATE: 73 BPM | BODY MASS INDEX: 32.49 KG/M2 | OXYGEN SATURATION: 98 %

## 2022-09-30 PROBLEM — I21.4 NSTEMI (NON-ST ELEVATED MYOCARDIAL INFARCTION): Status: ACTIVE | Noted: 2022-09-30

## 2022-09-30 PROBLEM — I10 HTN (HYPERTENSION): Status: ACTIVE | Noted: 2022-09-30

## 2022-09-30 LAB
ALBUMIN SERPL BCP-MCNC: 4 G/DL (ref 3.5–5.2)
ALP SERPL-CCNC: 82 U/L (ref 55–135)
ALT SERPL W/O P-5'-P-CCNC: 24 U/L (ref 10–44)
ANION GAP SERPL CALC-SCNC: 14 MMOL/L (ref 8–16)
ANION GAP SERPL CALC-SCNC: 14 MMOL/L (ref 8–16)
APTT BLDCRRT: 27.1 SEC (ref 21–32)
APTT BLDCRRT: 27.9 SEC (ref 21–32)
APTT BLDCRRT: 39.4 SEC (ref 21–32)
AST SERPL-CCNC: 36 U/L (ref 10–40)
AV INDEX (PROSTH): 0.76
AV MEAN GRADIENT: 6 MMHG
AV PEAK GRADIENT: 9 MMHG
AV VALVE AREA: 2.48 CM2
AV VELOCITY RATIO: 0.79
BASOPHILS # BLD AUTO: 0.05 K/UL (ref 0–0.2)
BASOPHILS # BLD AUTO: 0.06 K/UL (ref 0–0.2)
BASOPHILS # BLD AUTO: 0.07 K/UL (ref 0–0.2)
BASOPHILS NFR BLD: 0.5 % (ref 0–1.9)
BASOPHILS NFR BLD: 0.5 % (ref 0–1.9)
BASOPHILS NFR BLD: 0.6 % (ref 0–1.9)
BILIRUB SERPL-MCNC: 0.5 MG/DL (ref 0.1–1)
BNP SERPL-MCNC: 70 PG/ML (ref 0–99)
BSA FOR ECHO PROCEDURE: 2.01 M2
BUN SERPL-MCNC: 15 MG/DL (ref 8–23)
BUN SERPL-MCNC: 18 MG/DL (ref 8–23)
CALCIUM SERPL-MCNC: 9.5 MG/DL (ref 8.7–10.5)
CALCIUM SERPL-MCNC: 9.9 MG/DL (ref 8.7–10.5)
CHLORIDE SERPL-SCNC: 103 MMOL/L (ref 95–110)
CHLORIDE SERPL-SCNC: 106 MMOL/L (ref 95–110)
CO2 SERPL-SCNC: 21 MMOL/L (ref 23–29)
CO2 SERPL-SCNC: 23 MMOL/L (ref 23–29)
CREAT SERPL-MCNC: 0.9 MG/DL (ref 0.5–1.4)
CREAT SERPL-MCNC: 1.1 MG/DL (ref 0.5–1.4)
CTP QC/QA: YES
CV ECHO LV RWT: 0.65 CM
DIFFERENTIAL METHOD: ABNORMAL
DIFFERENTIAL METHOD: ABNORMAL
DIFFERENTIAL METHOD: NORMAL
DOP CALC AO PEAK VEL: 1.49 M/S
DOP CALC AO VTI: 33 CM
DOP CALC LVOT AREA: 3.3 CM2
DOP CALC LVOT DIAMETER: 2.04 CM
DOP CALC LVOT PEAK VEL: 1.18 M/S
DOP CALC LVOT STROKE VOLUME: 82 CM3
DOP CALC MV VTI: 24.9 CM
DOP CALCLVOT PEAK VEL VTI: 25.1 CM
E WAVE DECELERATION TIME: 306.2 MSEC
E/A RATIO: 1.43
ECHO LV POSTERIOR WALL: 1.3 CM (ref 0.6–1.1)
EJECTION FRACTION: 65 %
EOSINOPHIL # BLD AUTO: 0.1 K/UL (ref 0–0.5)
EOSINOPHIL # BLD AUTO: 0.1 K/UL (ref 0–0.5)
EOSINOPHIL # BLD AUTO: 0.2 K/UL (ref 0–0.5)
EOSINOPHIL NFR BLD: 0.7 % (ref 0–8)
EOSINOPHIL NFR BLD: 0.9 % (ref 0–8)
EOSINOPHIL NFR BLD: 1.4 % (ref 0–8)
ERYTHROCYTE [DISTWIDTH] IN BLOOD BY AUTOMATED COUNT: 12.7 % (ref 11.5–14.5)
ERYTHROCYTE [DISTWIDTH] IN BLOOD BY AUTOMATED COUNT: 12.8 % (ref 11.5–14.5)
ERYTHROCYTE [DISTWIDTH] IN BLOOD BY AUTOMATED COUNT: 12.9 % (ref 11.5–14.5)
EST. GFR  (NO RACE VARIABLE): 53 ML/MIN/1.73 M^2
EST. GFR  (NO RACE VARIABLE): >60 ML/MIN/1.73 M^2
FRACTIONAL SHORTENING: 42 % (ref 28–44)
GLUCOSE SERPL-MCNC: 115 MG/DL (ref 70–110)
GLUCOSE SERPL-MCNC: 116 MG/DL (ref 70–110)
HCT VFR BLD AUTO: 42.3 % (ref 37–48.5)
HCT VFR BLD AUTO: 42.6 % (ref 37–48.5)
HCT VFR BLD AUTO: 44.1 % (ref 37–48.5)
HGB BLD-MCNC: 14 G/DL (ref 12–16)
HGB BLD-MCNC: 14.2 G/DL (ref 12–16)
HGB BLD-MCNC: 14.6 G/DL (ref 12–16)
IMM GRANULOCYTES # BLD AUTO: 0.03 K/UL (ref 0–0.04)
IMM GRANULOCYTES # BLD AUTO: 0.04 K/UL (ref 0–0.04)
IMM GRANULOCYTES # BLD AUTO: 0.04 K/UL (ref 0–0.04)
IMM GRANULOCYTES NFR BLD AUTO: 0.3 % (ref 0–0.5)
IMM GRANULOCYTES NFR BLD AUTO: 0.4 % (ref 0–0.5)
IMM GRANULOCYTES NFR BLD AUTO: 0.4 % (ref 0–0.5)
INR PPP: 1 (ref 0.8–1.2)
INTERVENTRICULAR SEPTUM: 1.19 CM (ref 0.6–1.1)
IVC DIAMETER: 1.73 CM
IVRT: 98.95 MSEC
LA MAJOR: 5.84 CM
LA MINOR: 5.17 CM
LEFT ATRIUM SIZE: 3.62 CM
LEFT ATRIUM VOLUME INDEX MOD: 31.9 ML/M2
LEFT ATRIUM VOLUME MOD: 62.52 CM3
LEFT INTERNAL DIMENSION IN SYSTOLE: 2.29 CM (ref 2.1–4)
LEFT VENTRICLE DIASTOLIC VOLUME INDEX: 35.34 ML/M2
LEFT VENTRICLE DIASTOLIC VOLUME: 69.26 ML
LEFT VENTRICLE MASS INDEX: 89 G/M2
LEFT VENTRICLE SYSTOLIC VOLUME INDEX: 9.2 ML/M2
LEFT VENTRICLE SYSTOLIC VOLUME: 17.97 ML
LEFT VENTRICULAR INTERNAL DIMENSION IN DIASTOLE: 3.98 CM (ref 3.5–6)
LEFT VENTRICULAR MASS: 173.49 G
LVOT MG: 3.09 MMHG
LVOT MV: 0.84 CM/S
LYMPHOCYTES # BLD AUTO: 1.6 K/UL (ref 1–4.8)
LYMPHOCYTES # BLD AUTO: 1.7 K/UL (ref 1–4.8)
LYMPHOCYTES # BLD AUTO: 1.9 K/UL (ref 1–4.8)
LYMPHOCYTES NFR BLD: 14.7 % (ref 18–48)
LYMPHOCYTES NFR BLD: 15.4 % (ref 18–48)
LYMPHOCYTES NFR BLD: 19.9 % (ref 18–48)
MAGNESIUM SERPL-MCNC: 1.9 MG/DL (ref 1.6–2.6)
MCH RBC QN AUTO: 29.6 PG (ref 27–31)
MCH RBC QN AUTO: 29.7 PG (ref 27–31)
MCH RBC QN AUTO: 30 PG (ref 27–31)
MCHC RBC AUTO-ENTMCNC: 32.9 G/DL (ref 32–36)
MCHC RBC AUTO-ENTMCNC: 33.1 G/DL (ref 32–36)
MCHC RBC AUTO-ENTMCNC: 33.6 G/DL (ref 32–36)
MCV RBC AUTO: 88 FL (ref 82–98)
MCV RBC AUTO: 90 FL (ref 82–98)
MCV RBC AUTO: 91 FL (ref 82–98)
MONOCYTES # BLD AUTO: 0.6 K/UL (ref 0.3–1)
MONOCYTES # BLD AUTO: 0.9 K/UL (ref 0.3–1)
MONOCYTES # BLD AUTO: 1 K/UL (ref 0.3–1)
MONOCYTES NFR BLD: 5.9 % (ref 4–15)
MONOCYTES NFR BLD: 8.3 % (ref 4–15)
MONOCYTES NFR BLD: 9 % (ref 4–15)
MV MEAN GRADIENT: 1 MMHG
MV PEAK A VEL: 0.49 M/S
MV PEAK E VEL: 0.7 M/S
MV PEAK GRADIENT: 3 MMHG
MV STENOSIS PRESSURE HALF TIME: 91.22 MS
MV VALVE AREA BY CONTINUITY EQUATION: 3.29 CM2
MV VALVE AREA P 1/2 METHOD: 2.41 CM2
NEUTROPHILS # BLD AUTO: 6.9 K/UL (ref 1.8–7.7)
NEUTROPHILS # BLD AUTO: 8.1 K/UL (ref 1.8–7.7)
NEUTROPHILS # BLD AUTO: 8.3 K/UL (ref 1.8–7.7)
NEUTROPHILS NFR BLD: 72.6 % (ref 38–73)
NEUTROPHILS NFR BLD: 73.3 % (ref 38–73)
NEUTROPHILS NFR BLD: 75.2 % (ref 38–73)
NRBC BLD-RTO: 0 /100 WBC
PISA MRMAX VEL: 4.24 M/S
PISA TR MAX VEL: 2.77 M/S
PLATELET # BLD AUTO: 267 K/UL (ref 150–450)
PLATELET # BLD AUTO: 280 K/UL (ref 150–450)
PLATELET # BLD AUTO: 287 K/UL (ref 150–450)
PMV BLD AUTO: 10.7 FL (ref 9.2–12.9)
PMV BLD AUTO: 10.9 FL (ref 9.2–12.9)
PMV BLD AUTO: 11.1 FL (ref 9.2–12.9)
POC ACTIVATED CLOTTING TIME K: 132 SEC (ref 74–137)
POC ACTIVATED CLOTTING TIME K: 219 SEC (ref 74–137)
POC ACTIVATED CLOTTING TIME K: 254 SEC (ref 74–137)
POTASSIUM SERPL-SCNC: 4 MMOL/L (ref 3.5–5.1)
POTASSIUM SERPL-SCNC: 4.2 MMOL/L (ref 3.5–5.1)
PROT SERPL-MCNC: 7.9 G/DL (ref 6–8.4)
PROTHROMBIN TIME: 10.7 SEC (ref 9–12.5)
RA MAJOR: 4.37 CM
RA PRESSURE: 3 MMHG
RBC # BLD AUTO: 4.71 M/UL (ref 4–5.4)
RBC # BLD AUTO: 4.79 M/UL (ref 4–5.4)
RBC # BLD AUTO: 4.87 M/UL (ref 4–5.4)
RIGHT VENTRICULAR END-DIASTOLIC DIMENSION: 2.66 CM
SAMPLE: ABNORMAL
SAMPLE: ABNORMAL
SAMPLE: NORMAL
SARS-COV-2 RDRP RESP QL NAA+PROBE: NEGATIVE
SODIUM SERPL-SCNC: 140 MMOL/L (ref 136–145)
SODIUM SERPL-SCNC: 141 MMOL/L (ref 136–145)
TR MAX PG: 31 MMHG
TROPONIN I SERPL DL<=0.01 NG/ML-MCNC: 1.71 NG/ML (ref 0–0.03)
TROPONIN I SERPL DL<=0.01 NG/ML-MCNC: 5.37 NG/ML (ref 0–0.03)
TROPONIN I SERPL DL<=0.01 NG/ML-MCNC: 6.52 NG/ML (ref 0–0.03)
TV REST PULMONARY ARTERY PRESSURE: 34 MMHG
WBC # BLD AUTO: 11 K/UL (ref 3.9–12.7)
WBC # BLD AUTO: 11.02 K/UL (ref 3.9–12.7)
WBC # BLD AUTO: 9.52 K/UL (ref 3.9–12.7)

## 2022-09-30 PROCEDURE — 80053 COMPREHEN METABOLIC PANEL: CPT | Performed by: PHYSICIAN ASSISTANT

## 2022-09-30 PROCEDURE — 93010 ELECTROCARDIOGRAM REPORT: CPT | Mod: ,,, | Performed by: INTERNAL MEDICINE

## 2022-09-30 PROCEDURE — 83880 ASSAY OF NATRIURETIC PEPTIDE: CPT | Performed by: PHYSICIAN ASSISTANT

## 2022-09-30 PROCEDURE — 85730 THROMBOPLASTIN TIME PARTIAL: CPT | Mod: 91 | Performed by: STUDENT IN AN ORGANIZED HEALTH CARE EDUCATION/TRAINING PROGRAM

## 2022-09-30 PROCEDURE — 92928 PRQ TCAT PLMT NTRAC ST 1 LES: CPT | Mod: LD,,, | Performed by: INTERNAL MEDICINE

## 2022-09-30 PROCEDURE — 99152 PR MOD CONSCIOUS SEDATION, SAME PHYS, 5+ YRS, FIRST 15 MIN: ICD-10-PCS | Mod: ,,, | Performed by: INTERNAL MEDICINE

## 2022-09-30 PROCEDURE — 25000003 PHARM REV CODE 250: Performed by: INTERNAL MEDICINE

## 2022-09-30 PROCEDURE — 92978 ENDOLUMINL IVUS OCT C 1ST: CPT | Performed by: INTERNAL MEDICINE

## 2022-09-30 PROCEDURE — 63600175 PHARM REV CODE 636 W HCPCS: Performed by: INTERNAL MEDICINE

## 2022-09-30 PROCEDURE — 93458 L HRT ARTERY/VENTRICLE ANGIO: CPT | Mod: 26,59,51, | Performed by: INTERNAL MEDICINE

## 2022-09-30 PROCEDURE — 96365 THER/PROPH/DIAG IV INF INIT: CPT | Mod: 59

## 2022-09-30 PROCEDURE — 36415 COLL VENOUS BLD VENIPUNCTURE: CPT | Performed by: STUDENT IN AN ORGANIZED HEALTH CARE EDUCATION/TRAINING PROGRAM

## 2022-09-30 PROCEDURE — C1769 GUIDE WIRE: HCPCS | Performed by: INTERNAL MEDICINE

## 2022-09-30 PROCEDURE — G0378 HOSPITAL OBSERVATION PER HR: HCPCS

## 2022-09-30 PROCEDURE — 83735 ASSAY OF MAGNESIUM: CPT | Performed by: NURSE PRACTITIONER

## 2022-09-30 PROCEDURE — U0002 COVID-19 LAB TEST NON-CDC: HCPCS | Performed by: NURSE PRACTITIONER

## 2022-09-30 PROCEDURE — 93005 ELECTROCARDIOGRAM TRACING: CPT

## 2022-09-30 PROCEDURE — C1725 CATH, TRANSLUMIN NON-LASER: HCPCS | Performed by: INTERNAL MEDICINE

## 2022-09-30 PROCEDURE — 93458 L HRT ARTERY/VENTRICLE ANGIO: CPT | Mod: 59 | Performed by: INTERNAL MEDICINE

## 2022-09-30 PROCEDURE — 25000003 PHARM REV CODE 250: Performed by: HOSPITALIST

## 2022-09-30 PROCEDURE — C9600 PERC DRUG-EL COR STENT SING: HCPCS | Mod: LD | Performed by: INTERNAL MEDICINE

## 2022-09-30 PROCEDURE — 99153 MOD SED SAME PHYS/QHP EA: CPT | Performed by: INTERNAL MEDICINE

## 2022-09-30 PROCEDURE — 93010 EKG 12-LEAD: ICD-10-PCS | Mod: ,,, | Performed by: INTERNAL MEDICINE

## 2022-09-30 PROCEDURE — 85610 PROTHROMBIN TIME: CPT | Performed by: STUDENT IN AN ORGANIZED HEALTH CARE EDUCATION/TRAINING PROGRAM

## 2022-09-30 PROCEDURE — 99152 MOD SED SAME PHYS/QHP 5/>YRS: CPT | Mod: ,,, | Performed by: INTERNAL MEDICINE

## 2022-09-30 PROCEDURE — 36415 COLL VENOUS BLD VENIPUNCTURE: CPT | Performed by: HOSPITALIST

## 2022-09-30 PROCEDURE — 85730 THROMBOPLASTIN TIME PARTIAL: CPT | Mod: 91 | Performed by: HOSPITALIST

## 2022-09-30 PROCEDURE — 92978 PR IVUS, CORONARY, 1ST VESSEL: ICD-10-PCS | Mod: 26,,, | Performed by: INTERNAL MEDICINE

## 2022-09-30 PROCEDURE — 84484 ASSAY OF TROPONIN QUANT: CPT | Mod: 91 | Performed by: NURSE PRACTITIONER

## 2022-09-30 PROCEDURE — 63600175 PHARM REV CODE 636 W HCPCS: Performed by: STUDENT IN AN ORGANIZED HEALTH CARE EDUCATION/TRAINING PROGRAM

## 2022-09-30 PROCEDURE — 85025 COMPLETE CBC W/AUTO DIFF WBC: CPT | Performed by: STUDENT IN AN ORGANIZED HEALTH CARE EDUCATION/TRAINING PROGRAM

## 2022-09-30 PROCEDURE — 25500020 PHARM REV CODE 255: Performed by: INTERNAL MEDICINE

## 2022-09-30 PROCEDURE — 99220 PR INITIAL OBSERVATION CARE,LEVL III: ICD-10-PCS | Mod: 25,GC,, | Performed by: NURSE PRACTITIONER

## 2022-09-30 PROCEDURE — 85730 THROMBOPLASTIN TIME PARTIAL: CPT | Performed by: STUDENT IN AN ORGANIZED HEALTH CARE EDUCATION/TRAINING PROGRAM

## 2022-09-30 PROCEDURE — 93010 ELECTROCARDIOGRAM REPORT: CPT | Mod: 76,,, | Performed by: INTERNAL MEDICINE

## 2022-09-30 PROCEDURE — 96366 THER/PROPH/DIAG IV INF ADDON: CPT

## 2022-09-30 PROCEDURE — 92978 ENDOLUMINL IVUS OCT C 1ST: CPT | Mod: 26,,, | Performed by: INTERNAL MEDICINE

## 2022-09-30 PROCEDURE — 63600175 PHARM REV CODE 636 W HCPCS: Performed by: NURSE PRACTITIONER

## 2022-09-30 PROCEDURE — 99220 PR INITIAL OBSERVATION CARE,LEVL III: CPT | Mod: 25,GC,, | Performed by: NURSE PRACTITIONER

## 2022-09-30 PROCEDURE — C1874 STENT, COATED/COV W/DEL SYS: HCPCS | Performed by: INTERNAL MEDICINE

## 2022-09-30 PROCEDURE — 85025 COMPLETE CBC W/AUTO DIFF WBC: CPT | Mod: 91 | Performed by: PHYSICIAN ASSISTANT

## 2022-09-30 PROCEDURE — 25000003 PHARM REV CODE 250: Performed by: NURSE PRACTITIONER

## 2022-09-30 PROCEDURE — 84484 ASSAY OF TROPONIN QUANT: CPT | Performed by: PHYSICIAN ASSISTANT

## 2022-09-30 PROCEDURE — C1753 CATH, INTRAVAS ULTRASOUND: HCPCS | Performed by: INTERNAL MEDICINE

## 2022-09-30 PROCEDURE — C1887 CATHETER, GUIDING: HCPCS | Performed by: INTERNAL MEDICINE

## 2022-09-30 PROCEDURE — 93458 PR CATH PLACE/CORON ANGIO, IMG SUPER/INTERP,W LEFT HEART VENTRICULOGRAPHY: ICD-10-PCS | Mod: 26,59,51, | Performed by: INTERNAL MEDICINE

## 2022-09-30 PROCEDURE — C1894 INTRO/SHEATH, NON-LASER: HCPCS | Performed by: INTERNAL MEDICINE

## 2022-09-30 PROCEDURE — 96375 TX/PRO/DX INJ NEW DRUG ADDON: CPT

## 2022-09-30 PROCEDURE — 93005 ELECTROCARDIOGRAM TRACING: CPT | Mod: 59

## 2022-09-30 PROCEDURE — 92928 PR STENT: ICD-10-PCS | Mod: LD,,, | Performed by: INTERNAL MEDICINE

## 2022-09-30 PROCEDURE — 99152 MOD SED SAME PHYS/QHP 5/>YRS: CPT | Performed by: INTERNAL MEDICINE

## 2022-09-30 PROCEDURE — 85347 COAGULATION TIME ACTIVATED: CPT | Mod: 91 | Performed by: INTERNAL MEDICINE

## 2022-09-30 PROCEDURE — 80048 BASIC METABOLIC PNL TOTAL CA: CPT | Mod: XB | Performed by: NURSE PRACTITIONER

## 2022-09-30 PROCEDURE — 25000003 PHARM REV CODE 250: Performed by: STUDENT IN AN ORGANIZED HEALTH CARE EDUCATION/TRAINING PROGRAM

## 2022-09-30 DEVICE — STENT RONYX35018UX RESOLUTE ONYX 3.50X18
Type: IMPLANTABLE DEVICE | Site: CORONARY | Status: FUNCTIONAL
Brand: RESOLUTE ONYX™

## 2022-09-30 RX ORDER — ASPIRIN 325 MG
325 TABLET ORAL
Status: COMPLETED | OUTPATIENT
Start: 2022-09-30 | End: 2022-09-30

## 2022-09-30 RX ORDER — ACETAMINOPHEN 325 MG/1
650 TABLET ORAL EVERY 4 HOURS PRN
Status: DISCONTINUED | OUTPATIENT
Start: 2022-09-30 | End: 2022-09-30 | Stop reason: HOSPADM

## 2022-09-30 RX ORDER — HYDRALAZINE HYDROCHLORIDE 20 MG/ML
10 INJECTION INTRAMUSCULAR; INTRAVENOUS EVERY 6 HOURS PRN
Status: DISCONTINUED | OUTPATIENT
Start: 2022-09-30 | End: 2022-09-30 | Stop reason: HOSPADM

## 2022-09-30 RX ORDER — LATANOPROST 50 UG/ML
1 SOLUTION/ DROPS OPHTHALMIC NIGHTLY
Status: DISCONTINUED | OUTPATIENT
Start: 2022-09-30 | End: 2022-09-30 | Stop reason: HOSPADM

## 2022-09-30 RX ORDER — DIPHENHYDRAMINE HYDROCHLORIDE 50 MG/ML
INJECTION INTRAMUSCULAR; INTRAVENOUS
Status: DISCONTINUED | OUTPATIENT
Start: 2022-09-30 | End: 2022-09-30 | Stop reason: HOSPADM

## 2022-09-30 RX ORDER — SODIUM CHLORIDE 9 MG/ML
INJECTION, SOLUTION INTRAVENOUS CONTINUOUS
Status: DISCONTINUED | OUTPATIENT
Start: 2022-09-30 | End: 2022-09-30 | Stop reason: HOSPADM

## 2022-09-30 RX ORDER — LOSARTAN POTASSIUM 25 MG/1
25 TABLET ORAL DAILY
Status: DISCONTINUED | OUTPATIENT
Start: 2022-09-30 | End: 2022-09-30 | Stop reason: HOSPADM

## 2022-09-30 RX ORDER — IBUPROFEN 200 MG
16 TABLET ORAL
Status: DISCONTINUED | OUTPATIENT
Start: 2022-09-30 | End: 2022-09-30 | Stop reason: HOSPADM

## 2022-09-30 RX ORDER — IODIXANOL 320 MG/ML
INJECTION, SOLUTION INTRAVASCULAR
Status: DISCONTINUED | OUTPATIENT
Start: 2022-09-30 | End: 2022-09-30 | Stop reason: HOSPADM

## 2022-09-30 RX ORDER — ATORVASTATIN CALCIUM 80 MG/1
80 TABLET, FILM COATED ORAL DAILY
Qty: 90 TABLET | Refills: 3 | Status: SHIPPED | OUTPATIENT
Start: 2022-10-01 | End: 2023-09-11 | Stop reason: SDUPTHER

## 2022-09-30 RX ORDER — METOPROLOL TARTRATE 25 MG/1
25 TABLET, FILM COATED ORAL 2 TIMES DAILY
Qty: 60 TABLET | Refills: 11 | Status: SHIPPED | OUTPATIENT
Start: 2022-09-30 | End: 2023-04-21

## 2022-09-30 RX ORDER — ASPIRIN 81 MG/1
81 TABLET ORAL DAILY
Status: DISCONTINUED | OUTPATIENT
Start: 2022-09-30 | End: 2022-09-30 | Stop reason: HOSPADM

## 2022-09-30 RX ORDER — MIDAZOLAM HYDROCHLORIDE 1 MG/ML
INJECTION, SOLUTION INTRAMUSCULAR; INTRAVENOUS
Status: DISCONTINUED | OUTPATIENT
Start: 2022-09-30 | End: 2022-09-30 | Stop reason: HOSPADM

## 2022-09-30 RX ORDER — ASPIRIN 81 MG/1
81 TABLET ORAL DAILY
Qty: 30 TABLET | Refills: 0 | Status: SHIPPED | OUTPATIENT
Start: 2022-10-01 | End: 2022-10-17

## 2022-09-30 RX ORDER — ONDANSETRON 8 MG/1
8 TABLET, ORALLY DISINTEGRATING ORAL EVERY 8 HOURS PRN
Status: DISCONTINUED | OUTPATIENT
Start: 2022-09-30 | End: 2022-09-30 | Stop reason: HOSPADM

## 2022-09-30 RX ORDER — HEPARIN SODIUM 200 [USP'U]/100ML
INJECTION, SOLUTION INTRAVENOUS
Status: DISCONTINUED | OUTPATIENT
Start: 2022-09-30 | End: 2022-09-30 | Stop reason: HOSPADM

## 2022-09-30 RX ORDER — LOSARTAN POTASSIUM 25 MG/1
25 TABLET ORAL DAILY
Qty: 90 TABLET | Refills: 3 | Status: SHIPPED | OUTPATIENT
Start: 2022-10-01 | End: 2023-04-21

## 2022-09-30 RX ORDER — GLUCAGON 1 MG
1 KIT INJECTION
Status: DISCONTINUED | OUTPATIENT
Start: 2022-09-30 | End: 2022-09-30 | Stop reason: HOSPADM

## 2022-09-30 RX ORDER — FENTANYL CITRATE 50 UG/ML
INJECTION, SOLUTION INTRAMUSCULAR; INTRAVENOUS
Status: DISCONTINUED | OUTPATIENT
Start: 2022-09-30 | End: 2022-09-30 | Stop reason: HOSPADM

## 2022-09-30 RX ORDER — CLOPIDOGREL BISULFATE 75 MG/1
75 TABLET ORAL DAILY
Status: DISCONTINUED | OUTPATIENT
Start: 2022-10-01 | End: 2022-09-30 | Stop reason: HOSPADM

## 2022-09-30 RX ORDER — HEPARIN SODIUM,PORCINE/D5W 25000/250
12 INTRAVENOUS SOLUTION INTRAVENOUS CONTINUOUS
Status: DISCONTINUED | OUTPATIENT
Start: 2022-09-30 | End: 2022-09-30

## 2022-09-30 RX ORDER — METOPROLOL TARTRATE 25 MG/1
25 TABLET, FILM COATED ORAL 2 TIMES DAILY
Status: DISCONTINUED | OUTPATIENT
Start: 2022-09-30 | End: 2022-09-30 | Stop reason: HOSPADM

## 2022-09-30 RX ORDER — IBUPROFEN 200 MG
24 TABLET ORAL
Status: DISCONTINUED | OUTPATIENT
Start: 2022-09-30 | End: 2022-09-30 | Stop reason: HOSPADM

## 2022-09-30 RX ORDER — CLOPIDOGREL BISULFATE 75 MG/1
75 TABLET ORAL DAILY
Qty: 30 TABLET | Refills: 11 | Status: SHIPPED | OUTPATIENT
Start: 2022-10-01 | End: 2023-09-11 | Stop reason: SDUPTHER

## 2022-09-30 RX ORDER — TALC
6 POWDER (GRAM) TOPICAL NIGHTLY PRN
Status: DISCONTINUED | OUTPATIENT
Start: 2022-09-30 | End: 2022-09-30 | Stop reason: HOSPADM

## 2022-09-30 RX ORDER — SODIUM CHLORIDE 9 MG/ML
INJECTION, SOLUTION INTRAVENOUS CONTINUOUS
Status: ACTIVE | OUTPATIENT
Start: 2022-09-30 | End: 2022-09-30

## 2022-09-30 RX ORDER — HEPARIN SODIUM 1000 [USP'U]/ML
INJECTION, SOLUTION INTRAVENOUS; SUBCUTANEOUS
Status: DISCONTINUED | OUTPATIENT
Start: 2022-09-30 | End: 2022-09-30 | Stop reason: HOSPADM

## 2022-09-30 RX ORDER — NALOXONE HCL 0.4 MG/ML
0.02 VIAL (ML) INJECTION
Status: DISCONTINUED | OUTPATIENT
Start: 2022-09-30 | End: 2022-09-30 | Stop reason: HOSPADM

## 2022-09-30 RX ORDER — VERAPAMIL HYDROCHLORIDE 2.5 MG/ML
INJECTION, SOLUTION INTRAVENOUS
Status: DISCONTINUED | OUTPATIENT
Start: 2022-09-30 | End: 2022-09-30 | Stop reason: HOSPADM

## 2022-09-30 RX ORDER — ATORVASTATIN CALCIUM 40 MG/1
80 TABLET, FILM COATED ORAL DAILY
Status: DISCONTINUED | OUTPATIENT
Start: 2022-09-30 | End: 2022-09-30 | Stop reason: HOSPADM

## 2022-09-30 RX ORDER — CLOPIDOGREL BISULFATE 75 MG/1
600 TABLET ORAL ONCE
Status: COMPLETED | OUTPATIENT
Start: 2022-09-30 | End: 2022-09-30

## 2022-09-30 RX ORDER — HEPARIN SODIUM,PORCINE/D5W 25000/250
50 INTRAVENOUS SOLUTION INTRAVENOUS CONTINUOUS
Status: DISCONTINUED | OUTPATIENT
Start: 2022-09-30 | End: 2022-09-30

## 2022-09-30 RX ORDER — SODIUM CHLORIDE 0.9 % (FLUSH) 0.9 %
3 SYRINGE (ML) INJECTION EVERY 12 HOURS PRN
Status: DISCONTINUED | OUTPATIENT
Start: 2022-09-30 | End: 2022-09-30 | Stop reason: HOSPADM

## 2022-09-30 RX ORDER — LIDOCAINE HYDROCHLORIDE 10 MG/ML
INJECTION, SOLUTION EPIDURAL; INFILTRATION; INTRACAUDAL; PERINEURAL
Status: DISCONTINUED | OUTPATIENT
Start: 2022-09-30 | End: 2022-09-30 | Stop reason: HOSPADM

## 2022-09-30 RX ORDER — ONDANSETRON 2 MG/ML
4 INJECTION INTRAMUSCULAR; INTRAVENOUS EVERY 8 HOURS PRN
Status: DISCONTINUED | OUTPATIENT
Start: 2022-09-30 | End: 2022-09-30 | Stop reason: HOSPADM

## 2022-09-30 RX ADMIN — HYDRALAZINE HYDROCHLORIDE 10 MG: 20 INJECTION INTRAMUSCULAR; INTRAVENOUS at 03:09

## 2022-09-30 RX ADMIN — LOSARTAN POTASSIUM 25 MG: 25 TABLET, FILM COATED ORAL at 08:09

## 2022-09-30 RX ADMIN — CLOPIDOGREL 600 MG: 75 TABLET, FILM COATED ORAL at 08:09

## 2022-09-30 RX ADMIN — ATORVASTATIN CALCIUM 80 MG: 40 TABLET, FILM COATED ORAL at 08:09

## 2022-09-30 RX ADMIN — METOPROLOL TARTRATE 25 MG: 25 TABLET, FILM COATED ORAL at 08:09

## 2022-09-30 RX ADMIN — ASPIRIN 325 MG ORAL TABLET 325 MG: 325 PILL ORAL at 12:09

## 2022-09-30 RX ADMIN — HEPARIN SODIUM AND DEXTROSE 12 UNITS/KG/HR: 10000; 5 INJECTION INTRAVENOUS at 01:09

## 2022-09-30 RX ADMIN — ASPIRIN 81 MG: 81 TABLET, COATED ORAL at 08:09

## 2022-09-30 NOTE — H&P
[facility]  History & Physical    Patient Name: Romy Monae  MRN: [id number]  Patient Class: OP- Observation  Admission Date: 9/29/2022  Attending Physician: Ricardo Chance, *   Primary Care Provider: Rony Mayfield MD         Patient information was obtained from patient, past medical records and ER records.     Subjective:     Principal Problem:NSTEMI (non-ST elevated myocardial infarction)    Chief Complaint:   Chief Complaint   Patient presents with    Shortness of Breath     Patient presents to the ED with reports of having shortness of breath that started tonight. States having episodes of shortness of breath that are relieved after belching, but reports discomfort has not gone away. Patient states now having chest pain and left arm pain.     Chest Pain        HPI: Romy Monae is a 73yo female with a pmh of PE/DVT on eliquis, antithrombin III deficiency, pulmonary HTN, HLD. She presented with chest pain and SOB. She described midsternal chest pain radiating to arm x 30 minutes PTA.  She was belching which did relieve the pain. She has not had any more chest pain since arrival but has continued to belch. She states she has been having intermittent chest pain since May but it goes away and she thought it was gas. She denies N/V, diaphoresis. Denies history of HTN. ED workup revealed troponin 1.7. EKG with ST depression in lateral leads. She was given  and initiated on heparin drip in the ED.       Past Medical History:   Diagnosis Date    Fibula fracture     6/17       No past surgical history on file.    Review of patient's allergies indicates:   Allergen Reactions    No known allergies        No current facility-administered medications on file prior to encounter.     Current Outpatient Medications on File Prior to Encounter   Medication Sig    apixaban (ELIQUIS) 5 mg Tab Take 1 tablet (5 mg total) by mouth 2 (two) times daily.    latanoprost 0.005 % ophthalmic  solution latanoprost 0.005 % eye drops   INSTILL 1 DROP IN BOTH EYES EVERY NIGHT AT BEDTIME    multivit-min/iron/folic/lutein (CENTRUM SILVER WOMEN ORAL) Take by mouth once daily.    vit P-xwixvfs-qsfz-rutin-hb196 (BIOFLEX) 019-84-54-40 mg Tab Take by mouth.     Family History    None       Tobacco Use    Smoking status: Never    Smokeless tobacco: Never   Substance and Sexual Activity    Alcohol use: No    Drug use: Not on file    Sexual activity: Not on file     Review of Systems   Constitutional:  Negative for chills and fever.   HENT:  Negative for congestion.    Eyes:  Negative for visual disturbance.   Respiratory:  Positive for shortness of breath. Negative for cough.    Cardiovascular:  Positive for chest pain. Negative for palpitations and leg swelling.   Gastrointestinal:  Negative for abdominal pain and nausea.   Genitourinary:  Negative for dysuria.   Musculoskeletal:  Negative for arthralgias.   Skin:  Negative for wound.   Neurological:  Negative for weakness and headaches.   Psychiatric/Behavioral:  Negative for confusion.    Objective:     Vital Signs (Most Recent):  Temp: 97.9 °F (36.6 °C) (09/30/22 0426)  Pulse: 77 (09/30/22 0426)  Resp: 18 (09/30/22 0426)  BP: (!) 150/67 (09/30/22 0426)  SpO2: 99 % (09/30/22 0426)   Vital Signs (24h Range):  Temp:  [97.6 °F (36.4 °C)-97.9 °F (36.6 °C)] 97.9 °F (36.6 °C)  Pulse:  [75-93] 77  Resp:  [16-20] 18  SpO2:  [99 %-100 %] 99 %  BP: (150-191)/(67-89) 150/67     Weight: 88.5 kg (195 lb)  Body mass index is 32.45 kg/m².    Physical Exam  Vitals and nursing note reviewed.   Constitutional:       General: She is not in acute distress.     Appearance: She is obese.   HENT:      Head: Normocephalic and atraumatic.      Nose: Nose normal.      Mouth/Throat:      Mouth: Mucous membranes are moist.   Eyes:      Pupils: Pupils are equal, round, and reactive to light.   Cardiovascular:      Rate and Rhythm: Normal rate and regular rhythm.      Pulses: Normal  pulses.      Heart sounds: Normal heart sounds.   Pulmonary:      Effort: Pulmonary effort is normal.      Breath sounds: Normal breath sounds.   Abdominal:      General: Bowel sounds are normal.      Palpations: Abdomen is soft.   Musculoskeletal:         General: No swelling. Normal range of motion.      Cervical back: Normal range of motion.   Skin:     General: Skin is warm and dry.   Neurological:      Mental Status: She is alert and oriented to person, place, and time.   Psychiatric:         Behavior: Behavior normal.         Thought Content: Thought content normal.         CRANIAL NERVES     CN III, IV, VI   Pupils are equal, round, and reactive to light.     Significant Labs: All pertinent labs within the past 24 hours have been reviewed.    Significant Imaging: I have reviewed all pertinent imaging results/findings within the past 24 hours.    Assessment/Plan:     * NSTEMI (non-ST elevated myocardial infarction)  Denies chest pain on exam  Troponin uptrending 1.7-->5.3    -cont heparin drip  -cont ASA 81 daily  -start lipitor 80  -consult cardiology  -echo pending  -NPO  -repeat EKG  -trend troponin       HTN (hypertension)  /81 in ED  -hydralazine PRN  -start losartan 25  -will likely need BB  -cardiology consulted      History of pulmonary embolus (PE)  -hold eliquis      Hyperlipidemia  -start lipitor 80        VTE Risk Mitigation (From admission, onward)         Ordered     heparin 25,000 units in dextrose 5% (100 units/ml) IV bolus from bag - ADDITIONAL PRN BOLUS - 60 units/kg (max bolus 4000 units)  As needed (PRN)        Question:  Heparin Infusion Adjustment (DO NOT MODIFY ANSWER)  Answer:  [email or web address]\epic\Images\Pharmacy\HeparinInfusions\heparin LOW INTENSITY nomogram for [facility] [id number]    09/30/22 0055     heparin 25,000 units in dextrose 5% (100 units/ml) IV bolus from bag - ADDITIONAL PRN BOLUS - 30 units/kg (max bolus 4000 units)  As needed (PRN)        Question:  Heparin Infusion  Adjustment (DO NOT MODIFY ANSWER)  Answer:  \\[email or web address]\epic\Images\Pharmacy\HeparinInfusions\heparin LOW INTENSITY nomogram for [facility] [id number].pdf    09/30/22 0055     Reason for No Pharmacological VTE Prophylaxis  Once        Question:  Reasons:  Answer:  IV Heparin w/in 24 hrs. Pre or Post-Op    09/30/22 0149     IP VTE HIGH RISK PATIENT  Once         09/30/22 0149     Place sequential compression device  Until discontinued         09/30/22 0149     heparin 25,000 units in dextrose 5% 250 mL (100 units/mL) infusion LOW INTENSITY nomogram - [facility]  Continuous        Question Answer Comment   Heparin Infusion Adjustment (DO NOT MODIFY ANSWER) \\[email or web address]\epic\Images\Pharmacy\HeparinInfusions\heparin LOW INTENSITY nomogram for [facility] [id number].pdf    Begin at (in units/kg/hr) 12        09/30/22 0055                   Kelly Mckee NP  Department of Hospital Medicine   [place] [facility]

## 2022-09-30 NOTE — SUBJECTIVE & OBJECTIVE
Past Medical History:   Diagnosis Date    Fibula fracture     6/17       No past surgical history on file.    Review of patient's allergies indicates:   Allergen Reactions    No known allergies        No current facility-administered medications on file prior to encounter.     Current Outpatient Medications on File Prior to Encounter   Medication Sig    apixaban (ELIQUIS) 5 mg Tab Take 1 tablet (5 mg total) by mouth 2 (two) times daily.    latanoprost 0.005 % ophthalmic solution latanoprost 0.005 % eye drops   INSTILL 1 DROP IN BOTH EYES EVERY NIGHT AT BEDTIME    multivit-min/iron/folic/lutein (CENTRUM SILVER WOMEN ORAL) Take by mouth once daily.    vit K-qznmldr-zmix-rutin-hb196 (BIOFLEX) 793-85-72-40 mg Tab Take by mouth.     Family History    None       Tobacco Use    Smoking status: Never    Smokeless tobacco: Never   Substance and Sexual Activity    Alcohol use: No    Drug use: Not on file    Sexual activity: Not on file     Review of Systems   Constitutional: Negative for diaphoresis.   HENT: Negative.     Eyes: Negative.    Cardiovascular:  Positive for chest pain and dyspnea on exertion. Negative for irregular heartbeat, leg swelling, near-syncope, orthopnea, palpitations, paroxysmal nocturnal dyspnea and syncope.   Respiratory:  Positive for shortness of breath. Negative for cough.    Endocrine: Negative.    Hematologic/Lymphatic: Negative.    Skin: Negative.    Musculoskeletal: Negative.    Gastrointestinal:  Positive for flatus and heartburn. Negative for nausea and vomiting.   Genitourinary: Negative.    Neurological: Negative.  Negative for dizziness.   Psychiatric/Behavioral: Negative.     Allergic/Immunologic: Negative.    Objective:     Vital Signs (Most Recent):  Temp: 97.9 °F (36.6 °C) (09/30/22 0426)  Pulse: 77 (09/30/22 0426)  Resp: 18 (09/30/22 0426)  BP: (!) 150/67 (09/30/22 0426)  SpO2: 99 % (09/30/22 0426)   Vital Signs (24h Range):  Temp:  [97.6 °F (36.4 °C)-97.9 °F (36.6 °C)] 97.9 °F (36.6  °C)  Pulse:  [75-93] 77  Resp:  [16-20] 18  SpO2:  [99 %-100 %] 99 %  BP: (150-191)/(67-89) 150/67     Weight: 88.5 kg (195 lb)  Body mass index is 32.45 kg/m².    SpO2: 99 %  O2 Device (Oxygen Therapy): room air    No intake or output data in the 24 hours ending 09/30/22 0945    Lines/Drains/Airways       Peripheral Intravenous Line  Duration                  Peripheral IV - Single Lumen 09/30/22 0004 20 G Left Antecubital <1 day                    Physical Exam    Significant Labs: BMP:   Recent Labs   Lab 09/30/22 0004 09/30/22 0420   * 115*    141   K 4.0 4.2    106   CO2 23 21*   BUN 18 15   CREATININE 1.1 0.9   CALCIUM 9.9 9.5   MG  --  1.9   , CMP   Recent Labs   Lab 09/30/22 0004 09/30/22 0420    141   K 4.0 4.2    106   CO2 23 21*   * 115*   BUN 18 15   CREATININE 1.1 0.9   CALCIUM 9.9 9.5   PROT 7.9  --    ALBUMIN 4.0  --    BILITOT 0.5  --    ALKPHOS 82  --    AST 36  --    ALT 24  --    ANIONGAP 14 14   , CBC   Recent Labs   Lab 09/30/22  0004 09/30/22 0103 09/30/22  0421   WBC 11.00 11.02 9.52   HGB 14.6 14.0 14.2   HCT 44.1 42.6 42.3    267 287   , INR   Recent Labs   Lab 09/30/22 0103   INR 1.0   , Lipid Panel No results for input(s): CHOL, HDL, LDLCALC, TRIG, CHOLHDL in the last 48 hours., Troponin   Recent Labs   Lab 09/30/22  0004 09/30/22  0420 09/30/22  0756   TROPONINI 1.712* 5.374* 6.522*   , and All pertinent lab results from the last 24 hours have been reviewed.    Significant Imaging: Echocardiogram: Transthoracic echo (TTE) complete (Cupid Only): No results found for this or any previous visit.

## 2022-09-30 NOTE — PLAN OF CARE
Pt to cath recovery per stretcher with nurse Story w/ 2 rails up; bedside report received; pt connected to monitors; right radial access w/ vasc band to right wrist and no bleeding nor swelling; skin wm dry color pk; pt in no distress and aao; ns infusing at 150 ml per hour per pump; sinus on monitor; rails up x 2; bed low and locked; tele box at bedside; pts spouse called per Jez to update and pt spoke top her spouse; glasses at bedside

## 2022-09-30 NOTE — HPI
Romy Monae is a 71yo female with a pmh of PE/DVT on eliquis, antithrombin III deficiency, pulmonary HTN, HLD. She presented with chest pain and SOB. She described midsternal chest pain radiating to arm x 30 minutes PTA.  She was belching which did relieve the pain. She has not had any more chest pain since arrival but has continued to belch. She states she has been having intermittent chest pain since May but it goes away and she thought it was gas. She denies N/V, diaphoresis. Denies history of HTN. ED workup revealed troponin 1.7. EKG with ST depression in lateral leads. She was given  and initiated on heparin drip in the ED.

## 2022-09-30 NOTE — LETTER
September 30, 2022    Romy Monae  [address] [place] [address]             [facility]  [address]  [place] [address]  Phone: [phone]                                  [facility]   [address]   [place] [address]  [phone]         [facility]   [address] [place] [address] [phone]         [facility]    [address]  [place] [address] [phone]   Re: Romy Monae  Clinic number: [id number]    Dear Ms. Monae:    You were recently admitted to an [facility] facility for cardiac (heart) problem.  Your physician has referred you to [facility].  Cardiac Rehab Phase 2 is an educational and exercise program, conducted in a outpatient setting, proven to help reduce your risk for recurrent heart events.    Cardiac rehab has two major parts:    1. Exercise training to help you achieve cardiovascular fitness while learning how to exercise safely and improve muscle strength and endurance.  Your exercise prescription will be based on the results of the cardiopulmonary stress test (CPX) which will be done before entering the program and at completion.  2. Education, counseling and training to help you understand your heart condition and find ways to reduce your risk of future heart problems.  The cardiac rehab team will help you learn how to cope with the stress of adjusting to a new lifestyle and to deal with your fears about the future.    Phase 2 is a 36-session program, meeting 3 times a week for 12 weeks.  Each session consists of an hour of exercise and half-hour dedicated to the educational topic of the day.  Class days vary per location.  Please contact your nearest facility for details.    Through cardiac rehab you will learn:  About your heart condition, medical therapies, and medication  Risk factors in y our lifestyle contributing to  heart disease  New strategies to modify your risk factors  About a healthy diet that can lower your blood cholesterol, control weight, help prevent or control high blood pressure, and diabetes  How to stop smoking  How to manage stress    If you are interested in getting started, call the [facility] of your choosing.     Sincerely,     [facility]

## 2022-09-30 NOTE — PROCEDURES
Brief Operative Note:    : Bernardo Bernard MD     Referring Physician: Self,Aaareferral     All Operators: Surgeon(s):  MD Darlene Rodriguez MD     Preoperative Diagnosis: Shortness of breath [R06.02]  SOB (shortness of breath) [R06.02]  Chest pain [R07.9]  NSTEMI (non-ST elevated myocardial infarction) [I21.4]     Postop Diagnosis: Shortness of breath [R06.02]  SOB (shortness of breath) [R06.02]  Chest pain [R07.9]  NSTEMI (non-ST elevated myocardial infarction) [I21.4]    Treatments/Procedures: Procedure(s) (LRB):  Left heart cath (Left)  ANGIOGRAM, CORONARY ARTERY (N/A)  Angioplasty-coronary  Stent, Drug Eluting, Single Vessel, Coronary  IVUS, Coronary  Placement of Closure Device    Access: Right radial artery    Findings: Severe  single-vessel coronary artery  disease is present - high grade prox LAD stenosis. Mild to moderate non-obstructive coronary artery disease present elsewhere.    See catheterization report for full details.    Intervention: S/P IVUS guided PCI to prox LAD with 3.5x18 mm LAURA    See catheterization report for full details.    Closure device:  VascBand        Plan:  - Post cath protocol   - IVF @ 150 mL/hr x 4 hours  - Triple therapy for 1 month then stop aspirin:  - Continue aspirin 81 mg daily for 1 month, then stop  - Continue Plavix 75 mg daily for minimum 1 year  - Ok to resume Eliquis tonight  - Continue high intensity statin therapy (LDL goal < 70)  - Risk factor reduction (BP <130/80 mmHg, glycemic control, etc)  - Cardiac rehab referral  - 2D echocardiogram   - Ok to discharge patient home later today (after post cath protocol and 2D echo done)  - Follow up with outpatient cardiologist    Estimated Blood loss: 20 cc    Specimens removed: No    Darlene Cain MD

## 2022-09-30 NOTE — HOSPITAL COURSE
"Ms. Monae presented with chest pain, found to have NSTEMI. Initiated on ACS protocol with heparin gtt, DAPT, statin, and BB. Cardiology consulted: [facility] on 9/30 revealed severe single vessel LAD stenosis, now s/p IVUS guided PCI to prox LAD with 3.5x18mm LAURA. Will continue triple therapy with ASA/plavix/eliquis x1 month then stop ASA. Cardiac rehab referral. Extensive counseling provided.    BP (!) 128/57 (BP Location: Left arm, Patient Position: Lying)   Pulse 87   Temp 97.5 °F (36.4 °C) (Temporal)   Resp 16   Ht 5' 5" (1.651 m)   Wt 88.5 kg (195 lb)   LMP  (LMP Unknown)   SpO2 100%   BMI 32.45 kg/m²   Physical Exam  Vitals and nursing note reviewed.   Constitutional:       General: She is not in acute distress.     Appearance: She is obese.   HENT:      Head: Normocephalic and atraumatic.      Nose: Nose normal.      Mouth/Throat:      Mouth: Mucous membranes are moist.   Eyes:      Pupils: Pupils are equal, round, and reactive to light.   Cardiovascular:      Rate and Rhythm: Normal rate and regular rhythm.      Pulses: Normal pulses.      Heart sounds: Normal heart sounds.   Pulmonary:      Effort: Pulmonary effort is normal.      Breath sounds: Normal breath sounds.   Abdominal:      General: Bowel sounds are normal.      Palpations: Abdomen is soft.   Musculoskeletal:         General: No swelling. Normal range of motion.      Cervical back: Normal range of motion.   Skin:     General: Skin is warm and dry.   Neurological:      Mental Status: She is alert and oriented to person, place, and time.   Psychiatric:         Behavior: Behavior normal.         Thought Content: Thought content normal.            CRANIAL NERVES      CN III, IV, VI   Pupils are equal, round, and reactive to light.  "

## 2022-09-30 NOTE — SUBJECTIVE & OBJECTIVE
Past Medical History:   Diagnosis Date    Fibula fracture     6/17       No past surgical history on file.    Review of patient's allergies indicates:   Allergen Reactions    No known allergies        No current facility-administered medications on file prior to encounter.     Current Outpatient Medications on File Prior to Encounter   Medication Sig    apixaban (ELIQUIS) 5 mg Tab Take 1 tablet (5 mg total) by mouth 2 (two) times daily.    latanoprost 0.005 % ophthalmic solution latanoprost 0.005 % eye drops   INSTILL 1 DROP IN BOTH EYES EVERY NIGHT AT BEDTIME    multivit-min/iron/folic/lutein (CENTRUM SILVER WOMEN ORAL) Take by mouth once daily.    vit [id number]-imym-rutin-hb196 (BIOFLEX) 884-30-57-40 mg Tab Take by mouth.     Family History    None       Tobacco Use    Smoking status: Never    Smokeless tobacco: Never   Substance and Sexual Activity    Alcohol use: No    Drug use: Not on file    Sexual activity: Not on file     Review of Systems   Constitutional:  Negative for chills and fever.   HENT:  Negative for congestion.    Eyes:  Negative for visual disturbance.   Respiratory:  Positive for shortness of breath. Negative for cough.    Cardiovascular:  Positive for chest pain. Negative for palpitations and leg swelling.   Gastrointestinal:  Negative for abdominal pain and nausea.   Genitourinary:  Negative for dysuria.   Musculoskeletal:  Negative for arthralgias.   Skin:  Negative for wound.   Neurological:  Negative for weakness and headaches.   Psychiatric/Behavioral:  Negative for confusion.    Objective:     Vital Signs (Most Recent):  Temp: 97.9 °F (36.6 °C) (09/30/22 0426)  Pulse: 77 (09/30/22 0426)  Resp: 18 (09/30/22 0426)  BP: (!) 150/67 (09/30/22 0426)  SpO2: 99 % (09/30/22 0426)   Vital Signs (24h Range):  Temp:  [97.6 °F (36.4 °C)-97.9 °F (36.6 °C)] 97.9 °F (36.6 °C)  Pulse:  [75-93] 77  Resp:  [16-20] 18  SpO2:  [99 %-100 %] 99 %  BP: (150-191)/(67-89) 150/67     Weight: 88.5 kg (195 lb)  Body  mass index is 32.45 kg/m².    Physical Exam  Vitals and nursing note reviewed.   Constitutional:       General: She is not in acute distress.     Appearance: She is obese.   HENT:      Head: Normocephalic and atraumatic.      Nose: Nose normal.      Mouth/Throat:      Mouth: Mucous membranes are moist.   Eyes:      Pupils: Pupils are equal, round, and reactive to light.   Cardiovascular:      Rate and Rhythm: Normal rate and regular rhythm.      Pulses: Normal pulses.      Heart sounds: Normal heart sounds.   Pulmonary:      Effort: Pulmonary effort is normal.      Breath sounds: Normal breath sounds.   Abdominal:      General: Bowel sounds are normal.      Palpations: Abdomen is soft.   Musculoskeletal:         General: No swelling. Normal range of motion.      Cervical back: Normal range of motion.   Skin:     General: Skin is warm and dry.   Neurological:      Mental Status: She is alert and oriented to person, place, and time.   Psychiatric:         Behavior: Behavior normal.         Thought Content: Thought content normal.         CRANIAL NERVES     CN III, IV, VI   Pupils are equal, round, and reactive to light.     Significant Labs: All pertinent labs within the past 24 hours have been reviewed.    Significant Imaging: I have reviewed all pertinent imaging results/findings within the past 24 hours.

## 2022-09-30 NOTE — NURSING
Bedside report given to nurse Potter in pts room 427; right radial acces site soft and drsg cdi; pt aao; pt has no complaints; tele box on pt; bed low and locked and call bell in reach; nurse will resume iv fluids

## 2022-09-30 NOTE — PROGRESS NOTES
09/30/22 0427   Admission   Initial VN Admission Questions Complete   Shift   Virtual Nurse - Patient Verbalized Approval Of Camera Use;VN Rounding   Safety/Activity   Safety Promotion/Fall Prevention Fall Risk reviewed with patient/family  (room dark; unable to visualize pt and surroundings)   Pain/Comfort/Sleep   Comfort/Acceptable Pain Level 5   VN cued in to pt's room with permission. Admission questions completed. Plan of care reviewed with pt. Pt denies any needs at this time.  Instructed to call for needs/assist oob.

## 2022-09-30 NOTE — DISCHARGE SUMMARY
"[facility] Medicine  Discharge Summary      Patient Name: Romy Monae  MRN: [id number]  Patient Class: OP- Observation  Admission Date: 9/29/2022  Hospital Length of Stay: 0 days  Discharge Date and Time:  09/30/2022 3:47 PM  Attending Physician: Ricardo Chance, *   Discharging Provider: Ricardo Chance MD  Primary Care Provider: Rony Mayfield MD      HPI:   Romy Monae is a 73yo female with a pmh of PE/DVT on eliquis, antithrombin III deficiency, pulmonary HTN, HLD. She presented with chest pain and SOB. She described midsternal chest pain radiating to arm x 30 minutes PTA.  She was belching which did relieve the pain. She has not had any more chest pain since arrival but has continued to belch. She states she has been having intermittent chest pain since May but it goes away and she thought it was gas. She denies N/V, diaphoresis. Denies history of HTN. ED workup revealed troponin 1.7. EKG with ST depression in lateral leads. She was given  and initiated on heparin drip in the ED.       Procedure(s) (LRB):  Left heart cath (Left)  ANGIOGRAM, CORONARY ARTERY (N/A)  Angioplasty-coronary  Stent, Drug Eluting, Single Vessel, Coronary  IVUS, Coronary  Placement of Closure Device      Hospital Course:   Ms. Monae presented with chest pain, found to have NSTEMI. Initiated on ACS protocol with heparin gtt, DAPT, statin, and BB. Cardiology consulted: [facility] on 9/30 revealed severe single vessel LAD stenosis, now s/p IVUS guided PCI to prox LAD with 3.5x18mm LAURA. Will continue triple therapy with ASA/plavix/eliquis x1 month then stop ASA. Cardiac rehab referral. Extensive counseling provided.    BP (!) 128/57 (BP Location: Left arm, Patient Position: Lying)   Pulse 87   Temp 97.5 °F (36.4 °C) (Temporal)   Resp 16   Ht 5' 5" (1.651 m)   Wt 88.5 kg (195 lb)   LMP  (LMP Unknown)   SpO2 100%   BMI 32.45 kg/m²   Physical Exam  Vitals and nursing note reviewed.   Constitutional:     "   General: She is not in acute distress.     Appearance: She is obese.   HENT:      Head: Normocephalic and atraumatic.      Nose: Nose normal.      Mouth/Throat:      Mouth: Mucous membranes are moist.   Eyes:      Pupils: Pupils are equal, round, and reactive to light.   Cardiovascular:      Rate and Rhythm: Normal rate and regular rhythm.      Pulses: Normal pulses.      Heart sounds: Normal heart sounds.   Pulmonary:      Effort: Pulmonary effort is normal.      Breath sounds: Normal breath sounds.   Abdominal:      General: Bowel sounds are normal.      Palpations: Abdomen is soft.   Musculoskeletal:         General: No swelling. Normal range of motion.      Cervical back: Normal range of motion.   Skin:     General: Skin is warm and dry.   Neurological:      Mental Status: She is alert and oriented to person, place, and time.   Psychiatric:         Behavior: Behavior normal.         Thought Content: Thought content normal.            CRANIAL NERVES      CN III, IV, VI   Pupils are equal, round, and reactive to light.       Goals of Care Treatment Preferences:  Code Status: Full Code      Consults:   Consults (From admission, onward)        Status Ordering Provider     Cardiology-[facility]  Once        Provider:  (Not yet assigned)    ALLI Camara          No new Assessment & Plan notes have been filed under this hospital service since the last note was generated.  Service: Hospital Medicine    Final Active Diagnoses:    Diagnosis Date Noted POA    PRINCIPAL PROBLEM:  NSTEMI (non-ST elevated myocardial infarction) [I21.4] 09/30/2022 Yes    HTN (hypertension) [I10] 09/30/2022 Yes    Antithrombin III deficiency [D68.59] 12/19/2019 Yes    Hyperlipidemia [E78.5] 06/04/2018 Yes    History of pulmonary embolus (PE) [Z86.711] 02/06/2014 Yes      Problems Resolved During this Admission:       Discharged Condition: good    Disposition: Home or Self Care    Follow Up:    Patient Instructions:       Ambulatory referral/consult to Pharmacy Assistance   Standing Status: Future   Referral Priority: Routine Referral Type: Consultation   Referral Reason: Specialty Services Required   Number of Visits Requested: 1     Ambulatory referral/consult to Cardiology   Standing Status: Future   Referral Priority: Routine Referral Type: Consultation   Referral Reason: Specialty Services Required   Requested Specialty: Cardiology   Number of Visits Requested: 1     Diet Cardiac     Notify your health care provider if you experience any of the following:  temperature >100.4     Notify your health care provider if you experience any of the following:  persistent nausea and vomiting or diarrhea     Notify your health care provider if you experience any of the following:  severe uncontrolled pain     Notify your health care provider if you experience any of the following:  difficulty breathing or increased cough     Notify your health care provider if you experience any of the following:  severe persistent headache     Notify your health care provider if you experience any of the following:  persistent dizziness, light-headedness, or visual disturbances     Notify your health care provider if you experience any of the following:  increased confusion or weakness     Cardiac rehab phase ii   Standing Status: Future Standing Exp. Date: 09/30/23     Order Specific Question Answer Comments   Department [facility] CARDIAC REHAB    Select qualifying diagnosis: I21.4 - Non-ST elevation (NSTEMI) myocardial infarction      Activity as tolerated       Significant Diagnostic Studies: see above    Pending Diagnostic Studies:     Procedure Component Value Units Date/Time    APTT [[id number]]     Order Status: Sent Lab Status: No result     Specimen: Blood     APTT [[id number]] Collected: 09/30/22 0756    Order Status: Sent Lab Status: In process Updated: 09/30/22 0756    Specimen: Blood     Narrative:      Collection has been rescheduled by EDWIGE at  09/30/2022 04:22 Reason:   Nurse wants 4:00 PT cancelled, patient has an order for 7:45    CBC with Automated Differential [[id number]] Collected: 09/30/22 0420    Order Status: Sent Lab Status: In process Updated: 09/30/22 0421    Specimen: Blood     Echo [[id number]]  (Abnormal) Resulted: 09/30/22 1538    Order Status: Sent Lab Status: In process Updated: 09/30/22 1539     BSA 2.01 m2      IVC diameter 1.73 cm      Left Ventricular Outflow Tract Mean Velocity 0.84 cm/s      Left Ventricular Outflow Tract Mean Gradient 3.09 mmHg      LVIDd 3.98 cm      IVS 1.19 cm      Posterior Wall 1.30 cm      LVIDs 2.29 cm      FS 42 %      LV mass 173.49 g      LA size 3.62 cm      RVDD 2.66 cm      Left Ventricle Relative Wall Thickness 0.65 cm      AV mean gradient 6 mmHg      AV valve area 2.48 cm2      AV Velocity Ratio 0.79     AV index (prosthetic) 0.76     MV mean gradient 1 mmHg      MV valve area p 1/2 method 2.41 cm2      MV valve area by continuity eq 3.29 cm2      E/A ratio 1.43     E wave deceleration time 306.20 msec      IVRT 98.95 msec      LVOT diameter 2.04 cm      LVOT area 3.3 cm2      LVOT peak yasir 1.18 m/s      LVOT peak VTI 25.10 cm      Ao peak yasir 1.49 m/s      Ao VTI 33.0 cm      Mr max yasir 4.24 m/s      LVOT stroke volume 82.00 cm3      AV peak gradient 9 mmHg      MV peak gradient 3 mmHg      MV Peak E Yasir 0.70 m/s      TR Max Yasir 2.77 m/s      MV VTI 24.9 cm      MV stenosis pressure 1/2 time 91.22 ms      MV Peak A Yasir 0.49 m/s      LV Systolic Volume 17.97 mL      LV Systolic Volume Index 9.2 mL/m2      LV Diastolic Volume 69.26 mL      LV Diastolic Volume Index 35.34 mL/m2      LV Mass Index 89 g/m2      RA Major Axis 4.37 cm      Left Atrium Minor Axis 5.17 cm      Left Atrium Major Axis 5.84 cm      Triscuspid Valve Regurgitation Peak Gradient 31 mmHg      LA Volume Index (Mod) 31.9 mL/m2      LA volume (mod) 62.52 cm3     Narrative:      · Concentric remodeling and       Impression:                Medications:  Reconciled Home Medications:      Medication List      START taking these medications    aspirin 81 MG EC tablet  Commonly known as: ECOTRIN  Take 1 tablet (81 mg total) by mouth once daily.  Start taking on: October 1, 2022     atorvastatin 80 MG tablet  Commonly known as: LIPITOR  Take 1 tablet (80 mg total) by mouth once daily.  Start taking on: October 1, 2022     clopidogreL 75 mg tablet  Commonly known as: PLAVIX  Take 1 tablet (75 mg total) by mouth once daily.  Start taking on: October 1, 2022     losartan 25 MG tablet  Commonly known as: COZAAR  Take 1 tablet (25 mg total) by mouth once daily.  Start taking on: October 1, 2022     metoprolol tartrate 25 MG tablet  Commonly known as: LOPRESSOR  Take 1 tablet (25 mg total) by mouth 2 (two) times daily.        CONTINUE taking these medications    apixaban 5 mg Tab  Commonly known as: ELIQUIS  Take 1 tablet (5 mg total) by mouth 2 (two) times daily.     BIOFLEX 322-98-27-40 mg Tab  Generic drug: vit D-uzrcfoj-tnkd-rutin-hb196  Take by mouth.     CENTRUM SILVER WOMEN ORAL  Take by mouth once daily.     latanoprost 0.005 % ophthalmic solution  latanoprost 0.005 % eye drops   INSTILL 1 DROP IN BOTH EYES EVERY NIGHT AT BEDTIME            Indwelling Lines/Drains at time of discharge:   Lines/Drains/Airways     None                 Time spent on the discharge of patient: 40 minutes         Ricardo Chance MD  Department of Hospital Medicine  [facility]

## 2022-09-30 NOTE — PLAN OF CARE
" went to meet with patient. Patient reports she is independent and lives at home with her spouse. She does not use any DME or Home Health. Patient still drives, but her spouse will transport home at discharge. Patient reports she does not have a Cardiology doctor. She is agreeable to follow-up here in [place].  will request PCP and Cardiology follow-up from access navigator. Patient encouraged to call with any questions or concerns.  will continue to follow patient through transitions of care and assist with any discharge needs.     PCC appointment noted to be schedule.  to only request Cards follow-up.    Ambulatory Referral placed to Pharmacy Assistance. Patient reports her "Eliquis" if expensive.    Future Appointments   Date Time Provider Department Center   10/17/2022  9:00 AM Meredith Chapman MD [facility]   12/15/2022  2:00 PM Rony Mayfield MD [facility]         09/30/22 1048   Discharge Assessment   Assessment Type Discharge Planning Assessment   Confirmed/corrected address, phone number and insurance Yes   Confirmed Demographics Correct on Facesheet   Source of Information patient   Lives With significant other;spouse   Facility Arrived From: Home   Do you expect to return to your current living situation? Yes   Do you have help at home or someone to help you manage your care at home? Yes   Who are your caregiver(s) and their phone number(s)? Spouse--Steven Phone#[phone]   Prior to hospitilization cognitive status: Alert/Oriented   Current cognitive status: Alert/Oriented   Walking or Climbing Stairs Difficulty none   Dressing/Bathing Difficulty none   Equipment Currently Used at Home none   Do you take prescription medications? Yes   Do you have prescription coverage? Yes   Do you have any problems affording any of your prescribed medications? Yes   If yes, what medications? Eliquis   Is the patient taking medications as prescribed? yes "   Who is going to help you get home at discharge? Spouse--Steven Phone#[phone]   How do you get to doctors appointments? car, drives self;family or friend will provide   Are you on dialysis? No   Do you take coumadin? No   Discharge Plan A Home   Discharge Plan B Home with family;Home Health   DME Needed Upon Discharge  none   Discharge Plan discussed with: Patient   Discharge Barriers Identified None   Physical Activity   On average, how many days per week do you engage in moderate to strenuous exercise (like a brisk walk)? 0 days   On average, how many minutes do you engage in exercise at this level? 0 min   Financial Resource Strain   How hard is it for you to pay for the very basics like food, housing, medical care, and heating? Not hard   Housing Stability   In the last 12 months, was there a time when you were not able to pay the mortgage or rent on time? N   In the last 12 months, was there a time when you did not have a steady place to sleep or slept in a shelter (including now)? N   Transportation Needs   In the past 12 months, has lack of transportation kept you from medical appointments or from getting medications? no   In the past 12 months, has lack of transportation kept you from meetings, work, or from getting things needed for daily living? No   Food Insecurity   Within the past 12 months, you worried that your food would run out before you got the money to buy more. Never true   Within the past 12 months, the food you bought just didn't last and you didn't have money to get more. Never true   Stress   Do you feel stress - tense, restless, nervous, or anxious, or unable to sleep at night because your mind is troubled all the time - these days? Patient refu   Social Connections   In a typical week, how many times do you talk on the phone with family, friends, or neighbors? More than 3   How often do you get together with friends or relatives? More than 3   How often do you attend Confucianism or Mormon  services? Patient refu   Do you belong to any clubs or organizations such as Moravian groups, unions, fraternal or athletic groups, or school groups? Patient refu   How often do you attend meetings of the clubs or organizations you belong to? Patient refu   Are you , , , , never , or living with a partner?    Alcohol Use   Q1: How often do you have a drink containing alcohol? Never   Q2: How many drinks containing alcohol do you have on a typical day when you are drinking? None   Q3: How often do you have six or more drinks on one occasion? Never     Lawanda Solano RN    [phone]

## 2022-09-30 NOTE — PLAN OF CARE
Remaining air removed from R radial vasc band. Gauze and tegaderm dressing applied. Site is CDI. No bleeding or hematoma noted around site. Site and surrounding areas soft. +2 shelly radial pulses palpated. Skin normal in color, warm to touch, < 3 sec cap refill. Will continue to monitor pt.  Pt refusing lunch tray, chocolate pudding given at this time.

## 2022-09-30 NOTE — PLAN OF CARE
VN note: VN completed AVS and attachments and notified bedside nurseTariq. Will cont to be available and intervene prn.

## 2022-09-30 NOTE — TELEPHONE ENCOUNTER
Letter regarding Phase II cardiac rehab was sent to patient.  Will contact patient in 2 weeks to see if interested.  Also, information letter sent to [facility].  Evy Lee RN  Cardiac Rehab Nurse

## 2022-09-30 NOTE — FIRST PROVIDER EVALUATION
Emergency Department TeleTriage Encounter Note      CHIEF COMPLAINT    Chief Complaint   Patient presents with    Shortness of Breath     Patient presents to the ED with reports of having shortness of breath that started tonight. States having episodes of shortness of breath that are relieved after belching, but reports discomfort has not gone away. Patient states now having chest pain and left arm pain.     Chest Pain       VITAL SIGNS   Initial Vitals [09/29/22 2158]   BP Pulse Resp Temp SpO2   (!) 185/79 93 20 97.6 °F (36.4 °C) 100 %      MAP       --            ALLERGIES    Review of patient's allergies indicates:   Allergen Reactions    No known allergies        PROVIDER TRIAGE NOTE  72-year-old female to the ER for evaluation of shortness of breath.  Patient states that her symptoms resolved after belching.  Symptoms onset around 9:00 p.m..  Currently asymptomatic.  Vital signs reveal elevated blood pressure, otherwise no acute findings.  Patient nondistressed.      ORDERS  Labs Reviewed   CBC W/ AUTO DIFFERENTIAL   COMPREHENSIVE METABOLIC PANEL   TROPONIN I       ED Orders (720h ago, onward)      Start Ordered     Status Ordering Provider    09/29/22 2307 09/29/22 2306  Saline lock IV  Once         Ordered DUSTY KOO.    09/29/22 2307 09/29/22 2306  CBC Auto Differential  STAT         Ordered DUSTY KOO.    09/29/22 2307 09/29/22 2306  Comprehensive Metabolic Panel  STAT         Ordered DUSTY KOO.    09/29/22 2307 09/29/22 2306  Troponin I  STAT         Ordered DUSTY KOO.    09/29/22 2307 09/29/22 2306  X-Ray Chest AP Portable  1 time imaging         Ordered DUSTY KOO    09/29/22 2159 09/29/22 2158  EKG 12-lead  Once         Completed by DILLON MALDONADO on 9/29/2022 at  9:59 PM ASHLEY ZHENG              Virtual Visit Note: The provider triage portion of this emergency department evaluation and documentation was performed via [employer], a HIPAA-compliant  telemedicine application, in concert with a tele-presenter in the room. A face to face patient evaluation with one of my colleagues will occur once the patient is placed in an emergency department room.      DISCLAIMER: This note was prepared with [employer] voice recognition transcription software. Garbled syntax, mangled pronouns, and other bizarre constructions may be attributed to that software system.

## 2022-09-30 NOTE — ASSESSMENT & PLAN NOTE
EKG SR with lateral ST depression   Troponin up to 5.3 this AM  Loaded with asa and Plavix, on a heparin gtt.   BB, ARB initiated; continue statin  NPO for LHC today   TTE pending

## 2022-09-30 NOTE — NURSING
Patient up to bathroom per whch then to room 427 with chart on tele box monitor w/ transporter; right radial acces site soft and drsg cdi; ns fluids with pt; pt aao; pt has no complaints

## 2022-09-30 NOTE — CONSULTS
[facility] ([facility])  Cardiology  Consult Note    Patient Name: Romy Monae  MRN: [id number]  Admission Date: 9/29/2022  Hospital Length of Stay: 0 days  Code Status: Full Code   Attending Provider: Ricardo Chance, *   Consulting Provider: Addi Aleman NP  Primary Care Physician: Rony Mayfield MD  Principal Problem:NSTEMI (non-ST elevated myocardial infarction)    Patient information was obtained from patient, past medical records and ER records.     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 Patient presented to the ED with CP and SOB. Pain is described as pressure with radiation down her right arm. She reports belching relieved the pain. Patient reports similar episodes since May and attributed it to GERD. Pain was unrelieved with antiacids.  She denies N/V, diaphoresis. Troponin is up to 5.3 this AM.  EKG with ST depression in lateral leads. Patient loaded with DAPT and placed on a heparin gtt. BP was elevated and Losartan was initiated. She is on high intensity statin and BB initiated. NPO for LHC. TTE pending.        Past Medical History:   Diagnosis Date    Fibula fracture     6/17       No past surgical history on file.    Review of patient's allergies indicates:   Allergen Reactions    No known allergies        No current facility-administered medications on file prior to encounter.     Current Outpatient Medications on File Prior to Encounter   Medication Sig    apixaban (ELIQUIS) 5 mg Tab Take 1 tablet (5 mg total) by mouth 2 (two) times daily.    latanoprost 0.005 % ophthalmic solution latanoprost 0.005 % eye drops   INSTILL 1 DROP IN BOTH EYES EVERY NIGHT AT BEDTIME    multivit-min/iron/folic/lutein (CENTRUM SILVER WOMEN ORAL) Take by mouth  once daily.    vit Y-fikubep-ulta-rutin-hb196 (BIOFLEX) 865-83-12-40 mg Tab Take by mouth.     Family History    None       Tobacco Use    Smoking status: Never    Smokeless tobacco: Never   Substance and Sexual Activity    Alcohol use: No    Drug use: Not on file    Sexual activity: Not on file     Review of Systems   Constitutional: Negative for diaphoresis.   HENT: Negative.     Eyes: Negative.    Cardiovascular:  Positive for chest pain and dyspnea on exertion. Negative for irregular heartbeat, leg swelling, near-syncope, orthopnea, palpitations, paroxysmal nocturnal dyspnea and syncope.   Respiratory:  Positive for shortness of breath. Negative for cough.    Endocrine: Negative.    Hematologic/Lymphatic: Negative.    Skin: Negative.    Musculoskeletal: Negative.    Gastrointestinal:  Positive for flatus and heartburn. Negative for nausea and vomiting.   Genitourinary: Negative.    Neurological: Negative.  Negative for dizziness.   Psychiatric/Behavioral: Negative.     Allergic/Immunologic: Negative.    Objective:     Vital Signs (Most Recent):  Temp: 97.9 °F (36.6 °C) (09/30/22 0426)  Pulse: 77 (09/30/22 0426)  Resp: 18 (09/30/22 0426)  BP: (!) 150/67 (09/30/22 0426)  SpO2: 99 % (09/30/22 0426)   Vital Signs (24h Range):  Temp:  [97.6 °F (36.4 °C)-97.9 °F (36.6 °C)] 97.9 °F (36.6 °C)  Pulse:  [75-93] 77  Resp:  [16-20] 18  SpO2:  [99 %-100 %] 99 %  BP: (150-191)/(67-89) 150/67     Weight: 88.5 kg (195 lb)  Body mass index is 32.45 kg/m².    SpO2: 99 %  O2 Device (Oxygen Therapy): room air    No intake or output data in the 24 hours ending 09/30/22 0945    Lines/Drains/Airways       Peripheral Intravenous Line  Duration                  Peripheral IV - Single Lumen 09/30/22 0004 20 G Left Antecubital <1 day                    Physical Exam    Significant Labs: BMP:   Recent Labs   Lab 09/30/22  0004 09/30/22  0420   * 115*    141   K 4.0 4.2    106   CO2 23 21*   BUN 18 15   CREATININE  1.1 0.9   CALCIUM 9.9 9.5   MG  --  1.9   , CMP   Recent Labs   Lab 09/30/22  0004 09/30/22  0420    141   K 4.0 4.2    106   CO2 23 21*   * 115*   BUN 18 15   CREATININE 1.1 0.9   CALCIUM 9.9 9.5   PROT 7.9  --    ALBUMIN 4.0  --    BILITOT 0.5  --    ALKPHOS 82  --    AST 36  --    ALT 24  --    ANIONGAP 14 14   , CBC   Recent Labs   Lab 09/30/22  0004 09/30/22  0103 09/30/22  0421   WBC 11.00 11.02 9.52   HGB 14.6 14.0 14.2   HCT 44.1 42.6 42.3    267 287   , INR   Recent Labs   Lab 09/30/22  0103   INR 1.0   , Lipid Panel No results for input(s): CHOL, HDL, LDLCALC, TRIG, CHOLHDL in the last 48 hours., Troponin   Recent Labs   Lab 09/30/22 0004 09/30/22 0420 09/30/22  0756   TROPONINI 1.712* 5.374* 6.522*   , and All pertinent lab results from the last 24 hours have been reviewed.    Significant Imaging: Echocardiogram: Transthoracic echo (TTE) complete ([facility] Only): No results found for this or any previous visit.    Assessment and Plan:     * NSTEMI (non-ST elevated myocardial infarction)  EKG SR with lateral ST depression   Troponin up to 5.3 this AM  Loaded with asa and Plavix, on a heparin gtt.   BB, ARB initiated; continue statin  NPO for [facility] today   TTE pending     HTN (hypertension)  SBP 150s-190s  BB and ARB initiated- up titrate PRN    History of pulmonary embolus (PE)  On Eliquis- Last dose 09/29/2022 AM    Antithrombin III deficiency  On Eliquis as outpatient - holding for [facility]  Heparin gtt for now and will resume DOAC prior to DC     Hyperlipidemia  Continue statin         VTE Risk Mitigation (From admission, onward)         Ordered     heparin 25,000 units in dextrose 5% (100 units/ml) IV bolus from bag - ADDITIONAL PRN BOLUS - 60 units/kg (max bolus 4000 units)  As needed (PRN)        Question:  Heparin Infusion Adjustment (DO NOT MODIFY ANSWER)  Answer:  \\[email or web address]\epic\Images\Pharmacy\HeparinInfusions\heparin LOW INTENSITY nomogram for [facility] [id number]    09/30/22  0055     heparin 25,000 units in dextrose 5% (100 units/ml) IV bolus from bag - ADDITIONAL PRN BOLUS - 30 units/kg (max bolus 4000 units)  As needed (PRN)        Question:  Heparin Infusion Adjustment (DO NOT MODIFY ANSWER)  Answer:  \\[email or web address]\epic\Images\Pharmacy\HeparinInfusions\heparin LOW INTENSITY nomogram for [facility] [id number].pdf    09/30/22 0055     Reason for No Pharmacological VTE Prophylaxis  Once        Question:  Reasons:  Answer:  IV Heparin w/in 24 hrs. Pre or Post-Op    09/30/22 0149     IP VTE HIGH RISK PATIENT  Once         09/30/22 0149     Place sequential compression device  Until discontinued         09/30/22 0149     heparin 25,000 units in dextrose 5% 250 mL (100 units/mL) infusion LOW INTENSITY nomogram - [facility]  Continuous        Question Answer Comment   Heparin Infusion Adjustment (DO NOT MODIFY ANSWER) \\[email or web address]\epic\Images\Pharmacy\HeparinInfusions\heparin LOW INTENSITY nomogram for [facility] [id number].pdf    Begin at (in units/kg/hr) 12        09/30/22 0055                Thank you for your consult. I will follow-up with patient. Please contact us if you have any additional questions.    Addi Aleman NP  Cardiology   [place] [facility] ([facility])

## 2022-09-30 NOTE — PLAN OF CARE
Hand off telephone report called to nurse Duque for pts admit bed----- [id number]; pt aao; pt resting quietly in bed;tele box at bedside; slowly removing air from right radial vasc band; pt has no belongings; pt has no visitors present

## 2022-09-30 NOTE — ASSESSMENT & PLAN NOTE
On Eliquis as outpatient - holding for [facility]  Heparin gtt for now and will resume DOAC prior to DC

## 2022-09-30 NOTE — PLAN OF CARE
Problem: Adult Inpatient Plan of Care  Goal: Plan of Care Review  Outcome: Ongoing, Progressing  Goal: Patient-Specific Goal (Individualized)  Outcome: Ongoing, Progressing  Goal: Absence of Hospital-Acquired Illness or Injury  Outcome: Ongoing, Progressing  Goal: Optimal Comfort and Wellbeing  Outcome: Ongoing, Progressing  Goal: Readiness for Transition of Care  Outcome: Ongoing, Progressing   Pt in room with Heparin gtt infusing. Pt has no c/o pain. Rise in trop and order fr repeat EKG. Vitals stable.

## 2022-09-30 NOTE — NURSING
Right radial acces ssite soft and drsg cdi; Requested transport to bring pt to room 427 with nurse; ns infusing as ordered post cath lab; pt has stent card; chart and tele box at bedside; pt aao; pt has no complaints; sinus on monitor

## 2022-09-30 NOTE — ASSESSMENT & PLAN NOTE
Denies chest pain on exam  Troponin uptrending 1.7-->5.3    -cont heparin drip  -cont ASA 81 daily  -start lipitor 80  -consult cardiology  -echo pending  -NPO  -repeat EKG  -trend troponin

## 2022-09-30 NOTE — HPI
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 Patient presented to the ED with CP and SOB. Pain is described as pressure with radiation down her right arm. She reports belching relieved the pain. Patient reports similar episodes since May and attributed it to GERD. Pain was unrelieved with antiacids.  She denies N/V, diaphoresis. Troponin is up to 5.3 this AM.  EKG with ST depression in lateral leads. Patient loaded with DAPT and placed on a heparin gtt. BP was elevated and Losartan was initiated. She is on high intensity statin and BB initiated. NPO for LHC. TTE pending.

## 2022-09-30 NOTE — PLAN OF CARE
Discharge orders noted. No DME or Home Health ordered. PCC and Cardiology appointments scheduled. Family will transport home. No further case management needs.     Ambulatory referral/consult to Pharmacy Assistance   Complete by: Oct 07, 2022  Medication(s) needing assistance with?:   Eliquis    Patient Contacts    Name Relation Home Work Mobile   Steven Monae [phone]     Future Appointments   Date Time Provider Department Center   10/17/2022  9:00 AM Meredith Chapman MD [facility] IMPRI [facility]   10/17/2022 10:40 AM Bernardo Bernard MD [facility] CARDIO [facility]   12/15/2022  2:00 PM Rony Mayfield MD [facility]         09/30/22 1530   Final Note   Assessment Type Final Discharge Note   Anticipated Discharge Disposition Home   Hospital Resources/Appts/Education Provided Appointments scheduled by Navigator/Coordinator   Post-Acute Status   Discharge Delays None known at this time     Lawanda Solano RN    [phone]

## 2022-09-30 NOTE — ED PROVIDER NOTES
Encounter Date: 9/29/2022       History     Chief Complaint   Patient presents with    Shortness of Breath     Patient presents to the ED with reports of having shortness of breath that started tonight. States having episodes of shortness of breath that are relieved after belching, but reports discomfort has not gone away. Patient states now having chest pain and left arm pain.     Chest Pain     Patient is a 72-year-old female with a past medical history of hypertension, antithrombin 3 deficiency, prior PEs and DVTs on Eliquis presenting to the emergency department for midsternal chest pain, radiating to the left shoulder and arm x3 hours. Associated with shortness of breath. The pain initially began at rest and had no exacerbating factors, including exertion/pleuritic nature. She is currently chest pain free.  The pain lasted for about 2-2.5 hours, shortly after getting here and resolved after belching. She further denies nausea, vomiting and diaphoresis with the onset of the pain.    Review of patient's allergies indicates:   Allergen Reactions    No known allergies      Past Medical History:   Diagnosis Date    Fibula fracture     6/17     No past surgical history on file.  No family history on file.  Social History     Tobacco Use    Smoking status: Never    Smokeless tobacco: Never   Substance Use Topics    Alcohol use: No     Review of Systems   Constitutional:  Negative for activity change, chills and fever.   HENT:  Negative for congestion, ear pain and sore throat.    Eyes:  Negative for visual disturbance.   Respiratory:  Positive for shortness of breath. Negative for stridor.    Cardiovascular:  Positive for chest pain. Negative for palpitations.   Gastrointestinal:  Negative for abdominal pain, nausea and vomiting.   Genitourinary:  Negative for dysuria and urgency.   Musculoskeletal:  Negative for back pain.   Skin:  Negative for rash.   Neurological:  Negative for dizziness, syncope, weakness and  headaches.   Hematological:  Does not bruise/bleed easily.     Physical Exam     Initial Vitals [09/29/22 2158]   BP Pulse Resp Temp SpO2   (!) 185/79 93 20 97.6 °F (36.4 °C) 100 %      MAP       --         Physical Exam    Nursing note and vitals reviewed.  Constitutional: She appears well-developed and well-nourished. She is not diaphoretic. No distress.   Well-appearing. Speaking full sentences. No acute distress.   HENT:   Head: Normocephalic and atraumatic.   Right Ear: External ear normal.   Left Ear: External ear normal.   Neck: Neck supple.   Cardiovascular:  Normal rate, regular rhythm, normal heart sounds and intact distal pulses.           No murmur heard.  Pulmonary/Chest: Breath sounds normal. No respiratory distress. She has no wheezes. She has no rhonchi. She has no rales.   No reproducible chest wall tenderness palpation.   Abdominal: Abdomen is soft. She exhibits no distension. There is no abdominal tenderness. There is no rebound and no guarding.   Musculoskeletal:      Cervical back: Neck supple.     Neurological: She is alert and oriented to person, place, and time. GCS score is 15. GCS eye subscore is 4. GCS verbal subscore is 5. GCS motor subscore is 6.   Skin: Skin is warm. Capillary refill takes less than 2 seconds. No rash noted.   Psychiatric: She has a normal mood and affect.       ED Course   Procedures  Labs Reviewed   CBC W/ AUTO DIFFERENTIAL - Abnormal; Notable for the following components:       Result Value    Gran # (ANC) 8.1 (*)     Gran % 73.3 (*)     Lymph % 15.4 (*)     All other components within normal limits   COMPREHENSIVE METABOLIC PANEL - Abnormal; Notable for the following components:    Glucose 116 (*)     eGFR 53 (*)     All other components within normal limits   TROPONIN I - Abnormal; Notable for the following components:    Troponin I 1.712 (*)     All other components within normal limits   CBC W/ AUTO DIFFERENTIAL - Abnormal; Notable for the following components:     Gran # (ANC) 8.3 (*)     Gran % 75.2 (*)     Lymph % 14.7 (*)     All other components within normal limits    Narrative:     Draw baseline aPTT prior to starting the heparin bolus or  infusion  (if patient is on warfarin prior to heparin therapy)   B-TYPE NATRIURETIC PEPTIDE   B-TYPE NATRIURETIC PEPTIDE   APTT    Narrative:     Draw baseline aPTT prior to starting the heparin bolus or  infusion  (if patient is on warfarin prior to heparin therapy)   PROTIME-INR    Narrative:     Draw baseline aPTT prior to starting the heparin bolus or  infusion  (if patient is on warfarin prior to heparin therapy)   TROPONIN I   APTT   CBC W/ AUTO DIFFERENTIAL   URINALYSIS, REFLEX TO URINE CULTURE   BASIC METABOLIC PANEL   MAGNESIUM   CBC W/ AUTO DIFFERENTIAL   TROPONIN I   SARS-COV-2 RDRP GENE     EKG Readings: (Independently Interpreted)   Initial Reading: No STEMI. Previous EKG: Compared with most recent EKG Previous EKG Date: June 2018. Rhythm: Normal Sinus Rhythm. Heart Rate: 70. Ectopy: No Ectopy. Conduction: Normal.   Nonspecific st changes     Imaging Results              X-Ray Chest AP Portable (Final result)  Result time 09/30/22 00:52:40      Final result by Seema Carrera MD (09/30/22 00:52:40)                   Impression:      Please see above.      Electronically signed by: Seema Carrera MD  Date:    09/30/2022  Time:    00:52               Narrative:    EXAMINATION:  XR CHEST AP PORTABLE    CLINICAL HISTORY:  Shortness of breath    TECHNIQUE:  Single frontal view of the chest was performed.    COMPARISON:  06/03/2018    FINDINGS:  Cardiac monitoring leads overlie the chest.  The cardiomediastinal silhouette is within normal limits of size and configuration.  There is atherosclerosis of the thoracic aorta.  Mediastinal structures are midline.  There are low lung volumes with increased interstitial prominence likely relating to crowding of pulmonary bronchovascular markings from hypoventilatory status.   Superimposed component of interstitial edema or infectious process cannot be definitively excluded.  No confluent airspace consolidation, large volume of pleural fluid or pneumothorax identified.  Osseous structures are intact with degenerative change.                                    X-Rays:   Independently Interpreted Readings:   Chest X-Ray: Normal heart size.  No infiltrates.  No acute abnormalities.   Medications   heparin 25,000 units in dextrose 5% 250 mL (100 units/mL) infusion LOW INTENSITY nomogram - [facility] (12 Units/kg/hr × 69.6 kg (Adjusted) Intravenous New Bag 9/30/22 0139)   heparin 25,000 units in dextrose 5% (100 units/ml) IV bolus from bag - ADDITIONAL PRN BOLUS - 60 units/kg (max bolus 4000 units) (has no administration in time range)   heparin 25,000 units in dextrose 5% (100 units/ml) IV bolus from bag - ADDITIONAL PRN BOLUS - 30 units/kg (max bolus 4000 units) (has no administration in time range)   sodium chloride 0.9% flush 3 mL (has no administration in time range)   melatonin tablet 6 mg (has no administration in time range)   ondansetron injection 4 mg (has no administration in time range)   acetaminophen tablet 650 mg (has no administration in time range)   naloxone 0.4 mg/mL injection 0.02 mg (has no administration in time range)   glucose chewable tablet 16 g (has no administration in time range)   glucose chewable tablet 24 g (has no administration in time range)   glucagon (human recombinant) injection 1 mg (has no administration in time range)   dextrose 10% bolus 125 mL (has no administration in time range)   dextrose 10% bolus 250 mL (has no administration in time range)   aspirin tablet 325 mg (325 mg Oral Given 9/30/22 0030)   heparin 25,000 units in dextrose 5% (100 units/ml) IV bolus from bag INITIAL BOLUS (max bolus 4000 units) (4,000 Units Intravenous Bolus from Bag 9/30/22 0142)     Medical Decision Making:   History:   I obtained history from: someone other than patient.  Old  Medical Records: I decided to obtain old medical records.  Initial Assessment:   Emergent evaluation now resolved chest pain, associated with shortness of breath.  She is afebrile and hemodynamically stable.  Differential Diagnosis:   ACS, hypertensive emergency, covid vs bacterial pneumonia, unlikely CHF, unlikely COPD  Clinical Tests:   Lab Tests: Ordered and Reviewed  Radiological Study: Reviewed  Medical Tests: Ordered and Reviewed  ED Management:  Additionally considered PE, however patient is a/c on eliquis without hypoxia or tachycardia. Labs are remarkable for troponinemia at 1.7, consistent with NSTEMI. Given 325 ASA + heparin bolus and gtt. Relayed results to patient and . Discussed case with hospital medicine, who will admit patient to their service for continued troponin trending/eventual PCI.          Attending Attestation:   Physician Attestation Statement for Resident:  As the supervising MD   Physician Attestation Statement: I have personally seen and examined this patient.   I agree with the above history.  -:   As the supervising MD I agree with the above PE.     As the supervising MD I agree with the above treatment, course, plan, and disposition.    I have reviewed and agree with the residents interpretation of the following: lab data, x-rays and EKG.  I have reviewed the following: old records at this facility.                           Clinical Impression:   Final diagnoses:  [R06.02] Shortness of breath  [R06.02] SOB (shortness of breath)  [R07.9] Chest pain  [I21.4] NSTEMI (non-ST elevated myocardial infarction)        ED Disposition Condition    Observation                 Jez Bell MD  Resident  09/30/22 0207       Kimmy Ram DO  09/30/22 0257

## 2022-09-30 NOTE — NURSING
AVS and educational attachments shared with patient and  and son via [employer]. Discussed thoroughly. Patient and  and son verbalized clear understanding using teach back method. Notified bedside nurse of completion of education. At present no distress noted. Patient to be discharged via w/c with escort service and family with all of patient's belonings. Will cont to be available to patient and family and intervene prn.

## 2022-10-04 ENCOUNTER — OFFICE VISIT (OUTPATIENT)
Dept: PRIMARY CARE CLINIC | Facility: CLINIC | Age: 72
End: 2022-10-04
Payer: MEDICARE

## 2022-10-04 VITALS
WEIGHT: 193.56 LBS | SYSTOLIC BLOOD PRESSURE: 144 MMHG | HEART RATE: 62 BPM | BODY MASS INDEX: 32.21 KG/M2 | OXYGEN SATURATION: 96 % | DIASTOLIC BLOOD PRESSURE: 69 MMHG | TEMPERATURE: 98 F

## 2022-10-04 DIAGNOSIS — Z95.5 PRESENCE OF STENT IN LAD CORONARY ARTERY: ICD-10-CM

## 2022-10-04 DIAGNOSIS — D68.59 ANTITHROMBIN III DEFICIENCY: ICD-10-CM

## 2022-10-04 DIAGNOSIS — I21.4 NSTEMI (NON-ST ELEVATED MYOCARDIAL INFARCTION): Primary | ICD-10-CM

## 2022-10-04 DIAGNOSIS — E78.5 HYPERLIPIDEMIA, UNSPECIFIED HYPERLIPIDEMIA TYPE: ICD-10-CM

## 2022-10-04 DIAGNOSIS — I10 PRIMARY HYPERTENSION: ICD-10-CM

## 2022-10-04 DIAGNOSIS — Z86.711 HISTORY OF PULMONARY EMBOLUS (PE): ICD-10-CM

## 2022-10-04 DIAGNOSIS — Z86.718 HISTORY OF DVT (DEEP VEIN THROMBOSIS): ICD-10-CM

## 2022-10-04 PROBLEM — I82.412 DVT OF DEEP FEMORAL VEIN, LEFT: Status: RESOLVED | Noted: 2018-06-19 | Resolved: 2022-10-04

## 2022-10-04 PROBLEM — I26.99 PULMONARY EMBOLISM: Status: RESOLVED | Noted: 2018-06-03 | Resolved: 2022-10-04

## 2022-10-04 PROCEDURE — 1125F AMNT PAIN NOTED PAIN PRSNT: CPT | Mod: CPTII,S$GLB,, | Performed by: INTERNAL MEDICINE

## 2022-10-04 PROCEDURE — 1125F PR PAIN SEVERITY QUANTIFIED, PAIN PRESENT: ICD-10-PCS | Mod: CPTII,S$GLB,, | Performed by: INTERNAL MEDICINE

## 2022-10-04 PROCEDURE — 3288F FALL RISK ASSESSMENT DOCD: CPT | Mod: CPTII,S$GLB,, | Performed by: INTERNAL MEDICINE

## 2022-10-04 PROCEDURE — 3077F SYST BP >= 140 MM HG: CPT | Mod: CPTII,S$GLB,, | Performed by: INTERNAL MEDICINE

## 2022-10-04 PROCEDURE — 4010F PR ACE/ARB THEARPY RXD/TAKEN: ICD-10-PCS | Mod: CPTII,S$GLB,, | Performed by: INTERNAL MEDICINE

## 2022-10-04 PROCEDURE — 99999 PR PBB SHADOW E&M-EST. PATIENT-LVL III: ICD-10-PCS | Mod: PBBFAC,,, | Performed by: INTERNAL MEDICINE

## 2022-10-04 PROCEDURE — 1160F RVW MEDS BY RX/DR IN RCRD: CPT | Mod: CPTII,S$GLB,, | Performed by: INTERNAL MEDICINE

## 2022-10-04 PROCEDURE — 3288F PR FALLS RISK ASSESSMENT DOCUMENTED: ICD-10-PCS | Mod: CPTII,S$GLB,, | Performed by: INTERNAL MEDICINE

## 2022-10-04 PROCEDURE — 3078F PR MOST RECENT DIASTOLIC BLOOD PRESSURE < 80 MM HG: ICD-10-PCS | Mod: CPTII,S$GLB,, | Performed by: INTERNAL MEDICINE

## 2022-10-04 PROCEDURE — 3078F DIAST BP <80 MM HG: CPT | Mod: CPTII,S$GLB,, | Performed by: INTERNAL MEDICINE

## 2022-10-04 PROCEDURE — 3008F PR BODY MASS INDEX (BMI) DOCUMENTED: ICD-10-PCS | Mod: CPTII,S$GLB,, | Performed by: INTERNAL MEDICINE

## 2022-10-04 PROCEDURE — 99215 OFFICE O/P EST HI 40 MIN: CPT | Mod: S$GLB,,, | Performed by: INTERNAL MEDICINE

## 2022-10-04 PROCEDURE — 99999 PR PBB SHADOW E&M-EST. PATIENT-LVL III: CPT | Mod: PBBFAC,,, | Performed by: INTERNAL MEDICINE

## 2022-10-04 PROCEDURE — 1101F PR PT FALLS ASSESS DOC 0-1 FALLS W/OUT INJ PAST YR: ICD-10-PCS | Mod: CPTII,S$GLB,, | Performed by: INTERNAL MEDICINE

## 2022-10-04 PROCEDURE — 1101F PT FALLS ASSESS-DOCD LE1/YR: CPT | Mod: CPTII,S$GLB,, | Performed by: INTERNAL MEDICINE

## 2022-10-04 PROCEDURE — 99215 PR OFFICE/OUTPT VISIT, EST, LEVL V, 40-54 MIN: ICD-10-PCS | Mod: S$GLB,,, | Performed by: INTERNAL MEDICINE

## 2022-10-04 PROCEDURE — 3008F BODY MASS INDEX DOCD: CPT | Mod: CPTII,S$GLB,, | Performed by: INTERNAL MEDICINE

## 2022-10-04 PROCEDURE — 3077F PR MOST RECENT SYSTOLIC BLOOD PRESSURE >= 140 MM HG: ICD-10-PCS | Mod: CPTII,S$GLB,, | Performed by: INTERNAL MEDICINE

## 2022-10-04 PROCEDURE — 1160F PR REVIEW ALL MEDS BY PRESCRIBER/CLIN PHARMACIST DOCUMENTED: ICD-10-PCS | Mod: CPTII,S$GLB,, | Performed by: INTERNAL MEDICINE

## 2022-10-04 PROCEDURE — 1159F PR MEDICATION LIST DOCUMENTED IN MEDICAL RECORD: ICD-10-PCS | Mod: CPTII,S$GLB,, | Performed by: INTERNAL MEDICINE

## 2022-10-04 PROCEDURE — 1159F MED LIST DOCD IN RCRD: CPT | Mod: CPTII,S$GLB,, | Performed by: INTERNAL MEDICINE

## 2022-10-04 PROCEDURE — 4010F ACE/ARB THERAPY RXD/TAKEN: CPT | Mod: CPTII,S$GLB,, | Performed by: INTERNAL MEDICINE

## 2022-10-04 NOTE — PROGRESS NOTES
[facility]   New Visit Progress Note   Recent Hospital Discharge     PRESENTING HISTORY     Chief Complaint/Reason for Admission:  Follow up Hospital Discharge   PCP: Rony Mayfield MD    History of Present Illness:  Ms. Romy Monae is a 72 y.o. female who was recently admitted to the hospital.    [facility] Medicine  Discharge Summary        Patient Name: Romy Monae  MRN: [id number]  Patient Class: OP- Observation  Admission Date: 9/29/2022  Hospital Length of Stay: 0 days  Discharge Date and Time:  09/30/2022 3:47 PM  Attending Physician: Ricardo Chance, *   Discharging Provider: Ricardo Chance MD  Primary Care Provider: Rony Mayfield MD  ___________________________________________________________________    Today:  Presents to [facility] for initial hospital follow up.  Recently hospitalized for NSTEMI.  ACS protocol initiated with heparin infusion, DAPT, statin, Bblocker.  Admitted to [facility] Medicine team.  Seen in consultation with Cardiology team.   Underwent LHC on 9/30/22 with drug eluting stent placed to LAD.  Plan for triple therapy (Aspirin/Plavix/Eliquis) x 1 month, then stop Aspirin.   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   Discharged to home.  Cardiac rehab referral placed.     Patient unaccompanied today.  Ambulatory and independent with ADL's.  Reports compliance with all medication.  No recurrence of chest pain.     Review of Systems  General ROS: negative for chills, fever or weight loss  Psychological ROS: negative for hallucination, depression or suicidal ideation  Ophthalmic ROS: negative for blurry vision, photophobia or eye pain  ENT ROS: negative for epistaxis, sore throat or rhinorrhea  Respiratory ROS: no cough, shortness of breath, or wheezing  Cardiovascular ROS: no chest pain or dyspnea on exertion  Gastrointestinal ROS: no abdominal pain, change in bowel habits, or black/ bloody stools  Genito-Urinary  ROS: no dysuria, trouble voiding, or hematuria  Musculoskeletal ROS: negative for gait disturbance or muscular weakness  Neurological ROS: no syncope or seizures; no ataxia  Dermatological ROS: negative for pruritis, rash and jaundice + easy bruising       PAST HISTORY:     Past Medical History:   Diagnosis Date    Fibula fracture     6/17       Past Surgical History:   Procedure Laterality Date    CORONARY ANGIOGRAPHY N/A 9/30/2022    Procedure: ANGIOGRAM, CORONARY ARTERY;  Surgeon: Bernardo Bernard MD;  Location: [facility] CATH LAB/EP;  Service: Cardiology;  Laterality: N/A;    LEFT HEART CATHETERIZATION Left 9/30/2022    Procedure: Left heart cath;  Surgeon: Bernardo Bernard MD;  Location: [facility] CATH LAB/EP;  Service: Cardiology;  Laterality: Left;    PERCUTANEOUS TRANSLUMINAL BALLOON ANGIOPLASTY OF CORONARY ARTERY  9/30/2022    Procedure: Angioplasty-coronary;  Surgeon: Bernardo Bernard MD;  Location: [facility] CATH LAB/EP;  Service: Cardiology;;       No family history on file.      MEDICATIONS & ALLERGIES:     Current Outpatient Medications on File Prior to Visit   Medication Sig Dispense Refill    apixaban (ELIQUIS) 5 mg Tab Take 1 tablet (5 mg total) by mouth 2 (two) times daily. 180 tablet 3    aspirin (ECOTRIN) 81 MG EC tablet Take 1 tablet (81 mg total) by mouth once daily. 30 tablet 0    atorvastatin (LIPITOR) 80 MG tablet Take 1 tablet (80 mg total) by mouth once daily. 90 tablet 3    clopidogreL (PLAVIX) 75 mg tablet Take 1 tablet (75 mg total) by mouth once daily. 30 tablet 11    latanoprost 0.005 % ophthalmic solution latanoprost 0.005 % eye drops   INSTILL 1 DROP IN BOTH EYES EVERY NIGHT AT BEDTIME      losartan (COZAAR) 25 MG tablet Take 1 tablet (25 mg total) by mouth once daily. 90 tablet 3    metoprolol tartrate (LOPRESSOR) 25 MG tablet Take 1 tablet (25 mg total) by mouth 2 (two) times daily. 60 tablet 11    multivit-min/iron/folic/lutein (CENTRUM SILVER WOMEN ORAL) Take by mouth once daily.      vit  P-[id number]-[id number]-[id number] (BIOFLEX) 464-15-98-40 mg Tab Take by mouth.       No current facility-administered medications on file prior to visit.        Review of patient's allergies indicates:   Allergen Reactions    No known allergies        OBJECTIVE:     Vital Signs:  BP (!) 144/69 (BP Location: Left arm, Patient Position: Sitting, BP Method: Small (Automatic))   Pulse 62   Temp 97.9 °F (36.6 °C) (Oral)   Wt 87.8 kg (193 lb 9 oz)   LMP  (LMP Unknown)   SpO2 96%   BMI 32.21 kg/m²   Wt Readings from Last 3 Encounters:   09/29/22 2158 88.5 kg (195 lb)   06/03/22 1110 88.5 kg (195 lb)   12/14/21 1117 89.8 kg (198 lb)     Body mass index is 32.21 kg/m².        Physical Exam:  BP (!) 144/69 (BP Location: Left arm, Patient Position: Sitting, BP Method: Small (Automatic))   Pulse 62   Temp 97.9 °F (36.6 °C) (Oral)   Wt 87.8 kg (193 lb 9 oz)   LMP  (LMP Unknown)   SpO2 96%   BMI 32.21 kg/m²   General appearance: alert, cooperative, no distress  Constitutional:Oriented to person, place, and time  + appears well-developed and well-nourished.   HEENT: Normocephalic, atraumatic, neck symmetrical, no nasal discharge   Eyes: conjunctivae/corneas clear, PERRL, EOM's intact  Lungs: clear to auscultation bilaterally, no dullness to percussion bilaterally  Heart: regular rate and rhythm without rub; no displacement of the PMI   Abdomen: soft, non-tender; bowel sounds normoactive; no organomegaly  Extremities: extremities symmetric; no clubbing, cyanosis, or edema  Integument: Skin color, texture, turgor normal; no rashes; hair distrubution normal  + scattered bruising, large bruise R wrist and forearm   Neurologic: Alert and oriented X 3, normal strength, normal coordination and gait  Psychiatric: no pressured speech; normal affect; no evidence of impaired cognition     Laboratory  Lab Results   Component Value Date    WBC 9.52 09/30/2022    HGB 14.2 09/30/2022    HCT 42.3 09/30/2022    MCV 88 09/30/2022      09/30/2022     BMP  Lab Results   Component Value Date     09/30/2022    K 4.2 09/30/2022     09/30/2022    CO2 21 (L) 09/30/2022    BUN 15 09/30/2022    CREATININE 0.9 09/30/2022    CALCIUM 9.5 09/30/2022    ANIONGAP 14 09/30/2022    ESTGFRAFRICA 82 06/25/2019    EGFRNONAA 71 06/25/2019     Lab Results   Component Value Date    ALT 24 09/30/2022    AST 36 09/30/2022    ALKPHOS 82 09/30/2022    BILITOT 0.5 09/30/2022     Lab Results   Component Value Date    INR 1.0 09/30/2022    INR 1.0 06/03/2018    INR 1.0 06/03/2018     Lab Results   Component Value Date    HGBA1C 5.2 06/03/2018       Diagnostic Results:    2 D echo 9/30/22:  Concentric remodeling and normal systolic function.  The estimated ejection fraction is 65%.  Left ventricular endocardium not well visualized. Likely apical akinesis, mild anterior, and omer-septal wall hypokinesis  Normal left ventricular diastolic function.  Normal right ventricular size with normal right ventricular systolic function.  Normal central venous pressure (3 mmHg).  The estimated PA systolic pressure is 34 mmHg.  Trivial pericardial effusion.      ASSESSMENT & PLAN:       NSTEMI (non-ST elevated myocardial infarction)  Presence of stent in LAD coronary artery  - continue current medication regimen  - triple therapy (Eliquis/Plavix/Aspirin) x 30 days then discontinue Aspirin  - see Cardiology team 10/17/22  - patient has cardiac rehab scheduling information- she is advised to call and schedule     Primary hypertension  - monitoring blood pressure at home and numbers are at goal  - sodium restricted diet  - continue current medication regimen    Hyperlipidemia, unspecified hyperlipidemia type  - continue statin therapy     Antithrombin III deficiency  History of DVT (deep vein thrombosis)  History of pulmonary embolus (PE)  - continue long term Eliquis     Patient will be released from [facility].  She will see her PCP, Dr Mayfield, 12/15/22.    Instructions  for the patient:      Scheduled Follow-up :  Future Appointments   Date Time Provider Department Center   10/4/2022  3:00 PM Meredith Chapman MD [facility] IMPRI [facility]   10/17/2022 10:40 AM Bernardo Bernard MD [facility] CARDIO [facility]   12/15/2022  2:00 PM Rony Mayfield MD [facility]       Post Visit Medication List:     Medication List            Accurate as of October 4, 2022  3:32 PM. If you have any questions, ask your nurse or doctor.                CONTINUE taking these medications      apixaban 5 mg Tab  Commonly known as: ELIQUIS  Take 1 tablet (5 mg total) by mouth 2 (two) times daily.     aspirin 81 MG EC tablet  Commonly known as: ECOTRIN  Take 1 tablet (81 mg total) by mouth once daily.     atorvastatin 80 MG tablet  Commonly known as: LIPITOR  Take 1 tablet (80 mg total) by mouth once daily.     BIOFLEX [id number] mg Tab  Generic drug: vit A-nmvikot-crlm-rutin-hb196     CENTRUM SILVER WOMEN ORAL     clopidogreL 75 mg tablet  Commonly known as: PLAVIX  Take 1 tablet (75 mg total) by mouth once daily.     latanoprost 0.005 % ophthalmic solution     losartan 25 MG tablet  Commonly known as: COZAAR  Take 1 tablet (25 mg total) by mouth once daily.     metoprolol tartrate 25 MG tablet  Commonly known as: LOPRESSOR  Take 1 tablet (25 mg total) by mouth 2 (two) times daily.              Signing Physician:  Meredith Chapman MD

## 2022-10-11 DIAGNOSIS — R06.02 SOB (SHORTNESS OF BREATH): Primary | ICD-10-CM

## 2022-10-14 ENCOUNTER — TELEPHONE (OUTPATIENT)
Dept: CARDIAC REHAB | Facility: CLINIC | Age: 72
End: 2022-10-14
Payer: MEDICARE

## 2022-10-17 ENCOUNTER — OFFICE VISIT (OUTPATIENT)
Dept: CARDIOLOGY | Facility: CLINIC | Age: 72
End: 2022-10-17
Payer: MEDICARE

## 2022-10-17 VITALS
DIASTOLIC BLOOD PRESSURE: 73 MMHG | BODY MASS INDEX: 31.82 KG/M2 | HEIGHT: 65 IN | SYSTOLIC BLOOD PRESSURE: 126 MMHG | OXYGEN SATURATION: 99 % | HEART RATE: 69 BPM | WEIGHT: 191 LBS

## 2022-10-17 DIAGNOSIS — E78.5 HYPERLIPIDEMIA, UNSPECIFIED HYPERLIPIDEMIA TYPE: ICD-10-CM

## 2022-10-17 DIAGNOSIS — I21.4 NSTEMI (NON-ST ELEVATED MYOCARDIAL INFARCTION): Primary | ICD-10-CM

## 2022-10-17 DIAGNOSIS — D68.59 HYPERCOAGULABLE STATE, PRIMARY: ICD-10-CM

## 2022-10-17 DIAGNOSIS — Z95.5 PRESENCE OF STENT IN LAD CORONARY ARTERY: ICD-10-CM

## 2022-10-17 DIAGNOSIS — D68.59 ANTITHROMBIN III DEFICIENCY: ICD-10-CM

## 2022-10-17 DIAGNOSIS — Z86.718 HISTORY OF DVT (DEEP VEIN THROMBOSIS): ICD-10-CM

## 2022-10-17 DIAGNOSIS — I10 PRIMARY HYPERTENSION: ICD-10-CM

## 2022-10-17 PROCEDURE — 99214 OFFICE O/P EST MOD 30 MIN: CPT | Mod: S$GLB,,, | Performed by: INTERNAL MEDICINE

## 2022-10-17 PROCEDURE — 3288F FALL RISK ASSESSMENT DOCD: CPT | Mod: CPTII,S$GLB,, | Performed by: INTERNAL MEDICINE

## 2022-10-17 PROCEDURE — 1101F PR PT FALLS ASSESS DOC 0-1 FALLS W/OUT INJ PAST YR: ICD-10-PCS | Mod: CPTII,S$GLB,, | Performed by: INTERNAL MEDICINE

## 2022-10-17 PROCEDURE — 1126F AMNT PAIN NOTED NONE PRSNT: CPT | Mod: CPTII,S$GLB,, | Performed by: INTERNAL MEDICINE

## 2022-10-17 PROCEDURE — 3288F PR FALLS RISK ASSESSMENT DOCUMENTED: ICD-10-PCS | Mod: CPTII,S$GLB,, | Performed by: INTERNAL MEDICINE

## 2022-10-17 PROCEDURE — 3074F SYST BP LT 130 MM HG: CPT | Mod: CPTII,S$GLB,, | Performed by: INTERNAL MEDICINE

## 2022-10-17 PROCEDURE — 3074F PR MOST RECENT SYSTOLIC BLOOD PRESSURE < 130 MM HG: ICD-10-PCS | Mod: CPTII,S$GLB,, | Performed by: INTERNAL MEDICINE

## 2022-10-17 PROCEDURE — 1159F MED LIST DOCD IN RCRD: CPT | Mod: CPTII,S$GLB,, | Performed by: INTERNAL MEDICINE

## 2022-10-17 PROCEDURE — 1126F PR PAIN SEVERITY QUANTIFIED, NO PAIN PRESENT: ICD-10-PCS | Mod: CPTII,S$GLB,, | Performed by: INTERNAL MEDICINE

## 2022-10-17 PROCEDURE — 99214 PR OFFICE/OUTPT VISIT, EST, LEVL IV, 30-39 MIN: ICD-10-PCS | Mod: S$GLB,,, | Performed by: INTERNAL MEDICINE

## 2022-10-17 PROCEDURE — 1159F PR MEDICATION LIST DOCUMENTED IN MEDICAL RECORD: ICD-10-PCS | Mod: CPTII,S$GLB,, | Performed by: INTERNAL MEDICINE

## 2022-10-17 PROCEDURE — 99999 PR PBB SHADOW E&M-EST. PATIENT-LVL IV: ICD-10-PCS | Mod: PBBFAC,,, | Performed by: INTERNAL MEDICINE

## 2022-10-17 PROCEDURE — 3078F PR MOST RECENT DIASTOLIC BLOOD PRESSURE < 80 MM HG: ICD-10-PCS | Mod: CPTII,S$GLB,, | Performed by: INTERNAL MEDICINE

## 2022-10-17 PROCEDURE — 3078F DIAST BP <80 MM HG: CPT | Mod: CPTII,S$GLB,, | Performed by: INTERNAL MEDICINE

## 2022-10-17 PROCEDURE — 4010F PR ACE/ARB THEARPY RXD/TAKEN: ICD-10-PCS | Mod: CPTII,S$GLB,, | Performed by: INTERNAL MEDICINE

## 2022-10-17 PROCEDURE — 4010F ACE/ARB THERAPY RXD/TAKEN: CPT | Mod: CPTII,S$GLB,, | Performed by: INTERNAL MEDICINE

## 2022-10-17 PROCEDURE — 99999 PR PBB SHADOW E&M-EST. PATIENT-LVL IV: CPT | Mod: PBBFAC,,, | Performed by: INTERNAL MEDICINE

## 2022-10-17 PROCEDURE — 1101F PT FALLS ASSESS-DOCD LE1/YR: CPT | Mod: CPTII,S$GLB,, | Performed by: INTERNAL MEDICINE

## 2022-10-17 NOTE — PROGRESS NOTES
Subjective:   Patient ID:  Romy Monae is a 72 y.o. female who presents for  of Coronary Artery Disease, Hypertension, and Hyperlipidemia      HPI:     Romy Monae 72 y.o. female is here follow up and feeling well without any new complaints.         [facility]  2022 for NSTEMI         Patent LM, LCX, and RCA with luminal irregularities  95% proximal LAD stenosis treated with 3.5 x 18 Resolute Imer LAURA  IVUS guided revascularization   LVEDP 10 mmHg       Echo 2022     EF 65%   PASP 34 mmHg    She was discharged on triple therapy           Patient Active Problem List    Diagnosis Date Noted    Presence of stent in LAD coronary artery 10/04/2022    NSTEMI (non-ST elevated myocardial infarction) 2022    HTN (hypertension) 2022    Acute pulmonary embolism without acute cor pulmonale 2019    Hypercoagulable state, primary 2019    Antithrombin III deficiency 2019    Hyperlipidemia 2018    Pulmonary hypertension 2018    GERD (gastroesophageal reflux disease) 2018    History of DVT (deep vein thrombosis) 2014    History of pulmonary embolus (PE) 2014           Right Arm BP - Sittin/73  Left Arm BP - Sittin/72        LABS    LAST HbA1c  Lab Results   Component Value Date    HGBA1C 5.2 2018       Lipid panel  Lab Results   Component Value Date    CHOL 220 (H) 2021    CHOL 203 (H) 2018    CHOL 141 2011     Lab Results   Component Value Date    HDL 57 2021    HDL 45 2018    HDL 16 (L) 2011     Lab Results   Component Value Date    LDLCALC 147 (H) 2021    LDLCALC 128.2 2018    LDLCALC 93.2 2011     Lab Results   Component Value Date    TRIG 99 2021    TRIG 149 2018    TRIG 159 (H) 2011     Lab Results   Component Value Date    CHOLHDL 22.2 2018    CHOLHDL 11.3 (L) 2011            Review of Systems   Constitutional: Negative for diaphoresis, night sweats, weight gain and  weight loss.   HENT:  Negative for congestion.    Eyes:  Negative for blurred vision, discharge and double vision.   Cardiovascular:  Negative for chest pain, claudication, cyanosis, dyspnea on exertion, irregular heartbeat, leg swelling, near-syncope, orthopnea, palpitations, paroxysmal nocturnal dyspnea and syncope.   Respiratory:  Negative for cough, shortness of breath and wheezing.    Endocrine: Negative for cold intolerance, heat intolerance and polyphagia.   Hematologic/Lymphatic: Negative for adenopathy and bleeding problem. Does not bruise/bleed easily.   Skin:  Negative for dry skin and nail changes.   Musculoskeletal:  Negative for arthritis, back pain, falls, joint pain, myalgias and neck pain.   Gastrointestinal:  Negative for bloating, abdominal pain, change in bowel habit and constipation.   Genitourinary:  Negative for bladder incontinence, dysuria, flank pain, genital sores and missed menses.   Neurological:  Negative for aphonia, brief paralysis, difficulty with concentration, dizziness and weakness.   Psychiatric/Behavioral:  Negative for altered mental status and memory loss. The patient does not have insomnia.    Allergic/Immunologic: Negative for environmental allergies.     Objective:   Physical Exam  Constitutional:       Appearance: She is well-developed.      Interventions: She is not intubated.  HENT:      Head: Normocephalic and atraumatic.      Right Ear: External ear normal.      Left Ear: External ear normal.   Eyes:      General: No scleral icterus.        Right eye: No discharge.         Left eye: No discharge.      Conjunctiva/sclera: Conjunctivae normal.      Pupils: Pupils are equal, round, and reactive to light.   Neck:      Thyroid: No thyromegaly.      Vascular: Normal carotid pulses. No carotid bruit, hepatojugular reflux or JVD.      Trachea: No tracheal deviation.   Cardiovascular:      Rate and Rhythm: Normal rate and regular rhythm. No extrasystoles are present.     Chest  Wall: PMI is not displaced.      Pulses:           Carotid pulses are 2+ on the right side and 2+ on the left side.       Radial pulses are 2+ on the right side and 2+ on the left side.        Femoral pulses are 2+ on the right side and 2+ on the left side.       Popliteal pulses are 2+ on the right side and 2+ on the left side.        Dorsalis pedis pulses are 2+ on the right side and 2+ on the left side.        Posterior tibial pulses are 2+ on the right side and 2+ on the left side.      Heart sounds: S1 normal and S2 normal. Heart sounds not distant. No midsystolic click. No murmur heard.    No friction rub. No gallop. No S3 sounds.   Pulmonary:      Effort: Pulmonary effort is normal. No tachypnea, bradypnea, accessory muscle usage or respiratory distress. She is not intubated.      Breath sounds: Normal breath sounds. No stridor. No decreased breath sounds, wheezing or rales.   Chest:      Chest wall: No tenderness.   Abdominal:      General: There is no distension or abdominal bruit.      Palpations: There is no mass or pulsatile mass.      Tenderness: There is no abdominal tenderness. There is no guarding or rebound.   Musculoskeletal:         General: No tenderness. Normal range of motion.      Cervical back: Normal range of motion and neck supple.   Lymphadenopathy:      Cervical: No cervical adenopathy.   Skin:     General: Skin is warm.      Coloration: Skin is not pale.      Findings: No erythema or rash.   Neurological:      Mental Status: She is alert and oriented to person, place, and time.      Cranial Nerves: No cranial nerve deficit.      Coordination: Coordination normal.      Deep Tendon Reflexes: Reflexes are normal and symmetric.   Psychiatric:         Behavior: Behavior normal.         Thought Content: Thought content normal.         Judgment: Judgment normal.       Assessment:     1. NSTEMI (non-ST elevated myocardial infarction)    2. Hyperlipidemia, unspecified hyperlipidemia type    3.  Primary hypertension    4. Presence of stent in LAD coronary artery    5. Hypercoagulable state, primary    6. Antithrombin III deficiency    7. History of DVT (deep vein thrombosis)        Plan:         Stop aspirin. Continue with plavix and eliquis  Intense statin therapy  Weight loss  Exercise  Cardiac rehab II   Target LDL < 70      Continue with current medical plan and lifestyle changes.  Return sooner for concerns or questions. If symptoms persist go to the ED  I have reviewed all pertinent data on this patient       I have reviewed the patient's medical history in detail and updated the computerized patient record.    Orders Placed This Encounter   Procedures    CBC Auto Differential     Standing Status:   Future     Standing Expiration Date:   4/17/2024    Comprehensive Metabolic Panel     Standing Status:   Future     Standing Expiration Date:   4/17/2024    Lipid Panel     Standing Status:   Future     Standing Expiration Date:   4/17/2024    Cardiac Rehab Phase II     Standing Status:   Future     Standing Expiration Date:   10/17/2023     Order Specific Question:   Department     Answer:   [facility] CARDIAC REHAB     Order Specific Question:   Select qualifying diagnosis:     Answer:   I21.4 - Non-ST elevation (NSTEMI) myocardial infarction    Cardiac Rehab Phase II     [facility]     Standing Status:   Future     Number of Occurrences:   1     Standing Expiration Date:   10/17/2023     Order Specific Question:   Department     Answer:   External - Other     Order Specific Question:   Select qualifying diagnosis:     Answer:   I21.4 - Non-ST elevation (NSTEMI) myocardial infarction       Follow up as scheduled. Return sooner for concerns or questions            She expressed verbal understanding and agreed with the plan        Patient's Medications   New Prescriptions    No medications on file   Previous Medications    APIXABAN (ELIQUIS) 5 MG TAB    Take 1 tablet (5 mg total) by mouth 2 (two) times  daily.    ATORVASTATIN (LIPITOR) 80 MG TABLET    Take 1 tablet (80 mg total) by mouth once daily.    CLOPIDOGREL (PLAVIX) 75 MG TABLET    Take 1 tablet (75 mg total) by mouth once daily.    LATANOPROST 0.005 % OPHTHALMIC SOLUTION    latanoprost 0.005 % eye drops   INSTILL 1 DROP IN BOTH EYES EVERY NIGHT AT BEDTIME    LOSARTAN (COZAAR) 25 MG TABLET    Take 1 tablet (25 mg total) by mouth once daily.    METOPROLOL TARTRATE (LOPRESSOR) 25 MG TABLET    Take 1 tablet (25 mg total) by mouth 2 (two) times daily.    MULTIVIT-MIN/IRON/FOLIC/LUTEIN (CENTRUM SILVER WOMEN ORAL)    Take by mouth once daily.   Modified Medications    No medications on file   Discontinued Medications    ASPIRIN (ECOTRIN) 81 MG EC TABLET    Take 1 tablet (81 mg total) by mouth once daily.

## 2022-10-20 ENCOUNTER — TELEPHONE (OUTPATIENT)
Dept: CARDIAC REHAB | Facility: CLINIC | Age: 72
End: 2022-10-20
Payer: MEDICARE

## 2022-10-20 NOTE — TELEPHONE ENCOUNTER
Received insurance info back on patient.  Spoke with her & she states that she will call us back when she is ready to be scheduled.  Evy Lee RN  Cardiac Rehab Nurse

## 2022-11-29 ENCOUNTER — HOSPITAL ENCOUNTER (OUTPATIENT)
Dept: RADIOLOGY | Facility: HOSPITAL | Age: 72
Discharge: HOME | End: 2022-11-29
Attending: INTERNAL MEDICINE
Payer: MEDICARE

## 2022-11-29 DIAGNOSIS — S30.0XXA TRAUMATIC ECCHYMOSIS OF LOWER BACK, INITIAL ENCOUNTER: ICD-10-CM

## 2022-11-29 DIAGNOSIS — R07.81 RIB PAIN ON LEFT SIDE: ICD-10-CM

## 2022-11-29 PROCEDURE — 71100 X-RAY EXAM RIBS UNI 2 VIEWS: CPT | Mod: TC,FY,LT

## 2022-11-29 PROCEDURE — 71100 X-RAY EXAM RIBS UNI 2 VIEWS: CPT | Mod: 26,LT,, | Performed by: STUDENT IN AN ORGANIZED HEALTH CARE EDUCATION/TRAINING PROGRAM

## 2022-11-29 PROCEDURE — 71100 XR RIBS 2 VIEW LEFT: ICD-10-PCS | Mod: 26,LT,, | Performed by: STUDENT IN AN ORGANIZED HEALTH CARE EDUCATION/TRAINING PROGRAM

## 2023-01-02 PROBLEM — I21.4 NSTEMI (NON-ST ELEVATED MYOCARDIAL INFARCTION): Status: RESOLVED | Noted: 2022-09-30 | Resolved: 2023-01-02

## 2023-04-21 ENCOUNTER — OFFICE VISIT (OUTPATIENT)
Dept: CARDIOLOGY | Facility: CLINIC | Age: 73
End: 2023-04-21
Payer: MEDICARE

## 2023-04-21 VITALS
BODY MASS INDEX: 30.45 KG/M2 | DIASTOLIC BLOOD PRESSURE: 75 MMHG | WEIGHT: 183 LBS | SYSTOLIC BLOOD PRESSURE: 118 MMHG | OXYGEN SATURATION: 97 % | HEART RATE: 61 BPM

## 2023-04-21 DIAGNOSIS — Z86.711 HISTORY OF PULMONARY EMBOLUS (PE): ICD-10-CM

## 2023-04-21 DIAGNOSIS — D68.59 HYPERCOAGULABLE STATE, PRIMARY: ICD-10-CM

## 2023-04-21 DIAGNOSIS — E78.5 HYPERLIPIDEMIA, UNSPECIFIED HYPERLIPIDEMIA TYPE: ICD-10-CM

## 2023-04-21 DIAGNOSIS — D68.59 ANTITHROMBIN III DEFICIENCY: ICD-10-CM

## 2023-04-21 DIAGNOSIS — I10 PRIMARY HYPERTENSION: ICD-10-CM

## 2023-04-21 DIAGNOSIS — Z95.5 PRESENCE OF STENT IN LAD CORONARY ARTERY: ICD-10-CM

## 2023-04-21 DIAGNOSIS — I27.20 PULMONARY HYPERTENSION: ICD-10-CM

## 2023-04-21 DIAGNOSIS — I26.90 ACUTE SEPTIC PULMONARY EMBOLISM WITHOUT ACUTE COR PULMONALE: ICD-10-CM

## 2023-04-21 DIAGNOSIS — K21.00 GASTROESOPHAGEAL REFLUX DISEASE WITH ESOPHAGITIS WITHOUT HEMORRHAGE: ICD-10-CM

## 2023-04-21 DIAGNOSIS — Z86.718 HISTORY OF DVT (DEEP VEIN THROMBOSIS): ICD-10-CM

## 2023-04-21 DIAGNOSIS — I25.10 CORONARY ARTERY DISEASE INVOLVING NATIVE CORONARY ARTERY OF NATIVE HEART WITHOUT ANGINA PECTORIS: Primary | ICD-10-CM

## 2023-04-21 PROCEDURE — 3074F SYST BP LT 130 MM HG: CPT | Mod: CPTII,S$GLB,, | Performed by: INTERNAL MEDICINE

## 2023-04-21 PROCEDURE — 1126F PR PAIN SEVERITY QUANTIFIED, NO PAIN PRESENT: ICD-10-PCS | Mod: CPTII,S$GLB,, | Performed by: INTERNAL MEDICINE

## 2023-04-21 PROCEDURE — 3074F PR MOST RECENT SYSTOLIC BLOOD PRESSURE < 130 MM HG: ICD-10-PCS | Mod: CPTII,S$GLB,, | Performed by: INTERNAL MEDICINE

## 2023-04-21 PROCEDURE — 1159F PR MEDICATION LIST DOCUMENTED IN MEDICAL RECORD: ICD-10-PCS | Mod: CPTII,S$GLB,, | Performed by: INTERNAL MEDICINE

## 2023-04-21 PROCEDURE — 3008F BODY MASS INDEX DOCD: CPT | Mod: CPTII,S$GLB,, | Performed by: INTERNAL MEDICINE

## 2023-04-21 PROCEDURE — 1126F AMNT PAIN NOTED NONE PRSNT: CPT | Mod: CPTII,S$GLB,, | Performed by: INTERNAL MEDICINE

## 2023-04-21 PROCEDURE — 99999 PR PBB SHADOW E&M-EST. PATIENT-LVL III: ICD-10-PCS | Mod: PBBFAC,,, | Performed by: INTERNAL MEDICINE

## 2023-04-21 PROCEDURE — 99999 PR PBB SHADOW E&M-EST. PATIENT-LVL III: CPT | Mod: PBBFAC,,, | Performed by: INTERNAL MEDICINE

## 2023-04-21 PROCEDURE — 3288F PR FALLS RISK ASSESSMENT DOCUMENTED: ICD-10-PCS | Mod: CPTII,S$GLB,, | Performed by: INTERNAL MEDICINE

## 2023-04-21 PROCEDURE — 3078F DIAST BP <80 MM HG: CPT | Mod: CPTII,S$GLB,, | Performed by: INTERNAL MEDICINE

## 2023-04-21 PROCEDURE — 3078F PR MOST RECENT DIASTOLIC BLOOD PRESSURE < 80 MM HG: ICD-10-PCS | Mod: CPTII,S$GLB,, | Performed by: INTERNAL MEDICINE

## 2023-04-21 PROCEDURE — 99214 OFFICE O/P EST MOD 30 MIN: CPT | Mod: S$GLB,,, | Performed by: INTERNAL MEDICINE

## 2023-04-21 PROCEDURE — 1101F PT FALLS ASSESS-DOCD LE1/YR: CPT | Mod: CPTII,S$GLB,, | Performed by: INTERNAL MEDICINE

## 2023-04-21 PROCEDURE — 1160F PR REVIEW ALL MEDS BY PRESCRIBER/CLIN PHARMACIST DOCUMENTED: ICD-10-PCS | Mod: CPTII,S$GLB,, | Performed by: INTERNAL MEDICINE

## 2023-04-21 PROCEDURE — 4010F ACE/ARB THERAPY RXD/TAKEN: CPT | Mod: CPTII,S$GLB,, | Performed by: INTERNAL MEDICINE

## 2023-04-21 PROCEDURE — 3008F PR BODY MASS INDEX (BMI) DOCUMENTED: ICD-10-PCS | Mod: CPTII,S$GLB,, | Performed by: INTERNAL MEDICINE

## 2023-04-21 PROCEDURE — 1160F RVW MEDS BY RX/DR IN RCRD: CPT | Mod: CPTII,S$GLB,, | Performed by: INTERNAL MEDICINE

## 2023-04-21 PROCEDURE — 3288F FALL RISK ASSESSMENT DOCD: CPT | Mod: CPTII,S$GLB,, | Performed by: INTERNAL MEDICINE

## 2023-04-21 PROCEDURE — 99214 PR OFFICE/OUTPT VISIT, EST, LEVL IV, 30-39 MIN: ICD-10-PCS | Mod: S$GLB,,, | Performed by: INTERNAL MEDICINE

## 2023-04-21 PROCEDURE — 4010F PR ACE/ARB THEARPY RXD/TAKEN: ICD-10-PCS | Mod: CPTII,S$GLB,, | Performed by: INTERNAL MEDICINE

## 2023-04-21 PROCEDURE — 1159F MED LIST DOCD IN RCRD: CPT | Mod: CPTII,S$GLB,, | Performed by: INTERNAL MEDICINE

## 2023-04-21 PROCEDURE — 1101F PR PT FALLS ASSESS DOC 0-1 FALLS W/OUT INJ PAST YR: ICD-10-PCS | Mod: CPTII,S$GLB,, | Performed by: INTERNAL MEDICINE

## 2023-04-21 RX ORDER — METOPROLOL SUCCINATE 25 MG/1
25 TABLET, EXTENDED RELEASE ORAL DAILY
Qty: 90 TABLET | Refills: 3 | Status: SHIPPED | OUTPATIENT
Start: 2023-04-21 | End: 2024-03-20 | Stop reason: SDUPTHER

## 2023-04-21 NOTE — PROGRESS NOTES
Subjective:   Patient ID:  Romy Monae is a 72 y.o. female who presents for follow-up of Coronary Artery Disease      HPI    71 y/o female who presents for f/u CAD. Initially presented with CP, diagnosed with NSTEMI, had PCI to LAD. Compliant with meds. Feels tired and without energy. Previously not on BP meds. BP at home low 100's-110's. Denies CP, SOB/ESPINOZA, orthopnea, PND, syncope, palps, LE edema. She also has a hx of PHTN, DVT/PE AT3D, GERD, chronic AC with Eliquis.    Review of Systems   Constitutional: Positive for malaise/fatigue.   HENT:  Negative for congestion.    Eyes:  Negative for blurred vision.   Cardiovascular:  Negative for chest pain, claudication, cyanosis, dyspnea on exertion, irregular heartbeat, leg swelling, near-syncope, orthopnea, palpitations, paroxysmal nocturnal dyspnea and syncope.   Respiratory:  Negative for shortness of breath.    Endocrine: Negative for polyuria.   Hematologic/Lymphatic: Negative for bleeding problem.   Skin:  Negative for itching and rash.   Musculoskeletal:  Negative for joint swelling, muscle cramps and muscle weakness.   Gastrointestinal:  Negative for abdominal pain, hematemesis, hematochezia, melena, nausea and vomiting.   Genitourinary:  Negative for dysuria and hematuria.   Neurological:  Negative for dizziness, focal weakness, headaches, light-headedness, loss of balance and weakness.   Psychiatric/Behavioral:  Negative for depression. The patient is not nervous/anxious.      Objective:   Physical Exam  Constitutional:       Appearance: She is well-developed.   HENT:      Head: Normocephalic and atraumatic.   Neck:      Vascular: No JVD.   Cardiovascular:      Rate and Rhythm: Normal rate and regular rhythm.      Pulses:           Carotid pulses are 2+ on the right side and 2+ on the left side.       Radial pulses are 2+ on the right side and 2+ on the left side.        Femoral pulses are 2+ on the right side and 2+ on the left side.       Dorsalis pedis  pulses are 2+ on the right side and 2+ on the left side.        Posterior tibial pulses are 2+ on the right side and 2+ on the left side.      Heart sounds: Normal heart sounds.   Pulmonary:      Effort: Pulmonary effort is normal.      Breath sounds: Normal breath sounds.   Abdominal:      General: Bowel sounds are normal.      Palpations: Abdomen is soft.   Musculoskeletal:      Cervical back: Neck supple.   Skin:     General: Skin is warm and dry.   Neurological:      Mental Status: She is alert and oriented to person, place, and time.   Psychiatric:         Behavior: Behavior normal.         Thought Content: Thought content normal.       Assessment:      1. Coronary artery disease involving native coronary artery of native heart without angina pectoris    2. Presence of stent in LAD coronary artery    3. Primary hypertension    4. Hyperlipidemia, unspecified hyperlipidemia type    5. Pulmonary hypertension    6. History of pulmonary embolus (PE)    7. History of DVT (deep vein thrombosis)    8. Acute septic pulmonary embolism without acute cor pulmonale    9. Antithrombin III deficiency    10. Hypercoagulable state, primary    11. Gastroesophageal reflux disease with esophagitis without hemorrhage      73 y/o pt with hx and presentation as above. Doing well from a cardiac perspective and compensated from a HF perspective. Will D/C losartan and change to Toprol. Will discuss with H/O change to Xarelto for once daily dosing. Needs to stay active. Discussed the etiology, evaluation, and management of CAD, PCI, DVT, PE. Discussed the importance of med compliance, heart healthy diet, and regular exercise.     Plan:     -D/C losartan, lopressor  -Start Toprol 25 mg daily  -f/u in 1 month

## 2023-05-30 ENCOUNTER — OFFICE VISIT (OUTPATIENT)
Dept: CARDIOLOGY | Facility: CLINIC | Age: 73
End: 2023-05-30
Payer: MEDICARE

## 2023-05-30 VITALS
HEART RATE: 67 BPM | HEIGHT: 65 IN | BODY MASS INDEX: 30.73 KG/M2 | WEIGHT: 184.44 LBS | DIASTOLIC BLOOD PRESSURE: 79 MMHG | SYSTOLIC BLOOD PRESSURE: 146 MMHG | OXYGEN SATURATION: 95 %

## 2023-05-30 DIAGNOSIS — K21.00 GASTROESOPHAGEAL REFLUX DISEASE WITH ESOPHAGITIS WITHOUT HEMORRHAGE: ICD-10-CM

## 2023-05-30 DIAGNOSIS — I27.20 PULMONARY HYPERTENSION: ICD-10-CM

## 2023-05-30 DIAGNOSIS — I25.10 CORONARY ARTERY DISEASE INVOLVING NATIVE CORONARY ARTERY OF NATIVE HEART WITHOUT ANGINA PECTORIS: Primary | ICD-10-CM

## 2023-05-30 DIAGNOSIS — Z95.5 PRESENCE OF STENT IN LAD CORONARY ARTERY: ICD-10-CM

## 2023-05-30 DIAGNOSIS — D68.59 ANTITHROMBIN III DEFICIENCY: ICD-10-CM

## 2023-05-30 DIAGNOSIS — E78.5 HYPERLIPIDEMIA, UNSPECIFIED HYPERLIPIDEMIA TYPE: ICD-10-CM

## 2023-05-30 DIAGNOSIS — D68.59 HYPERCOAGULABLE STATE, PRIMARY: ICD-10-CM

## 2023-05-30 DIAGNOSIS — Z86.711 HISTORY OF PULMONARY EMBOLUS (PE): ICD-10-CM

## 2023-05-30 DIAGNOSIS — I10 HYPERTENSION, UNSPECIFIED TYPE: ICD-10-CM

## 2023-05-30 DIAGNOSIS — Z86.718 HISTORY OF DVT (DEEP VEIN THROMBOSIS): ICD-10-CM

## 2023-05-30 PROCEDURE — 1160F RVW MEDS BY RX/DR IN RCRD: CPT | Mod: CPTII,S$GLB,, | Performed by: INTERNAL MEDICINE

## 2023-05-30 PROCEDURE — 3288F PR FALLS RISK ASSESSMENT DOCUMENTED: ICD-10-PCS | Mod: CPTII,S$GLB,, | Performed by: INTERNAL MEDICINE

## 2023-05-30 PROCEDURE — 3077F PR MOST RECENT SYSTOLIC BLOOD PRESSURE >= 140 MM HG: ICD-10-PCS | Mod: CPTII,S$GLB,, | Performed by: INTERNAL MEDICINE

## 2023-05-30 PROCEDURE — 3008F BODY MASS INDEX DOCD: CPT | Mod: CPTII,S$GLB,, | Performed by: INTERNAL MEDICINE

## 2023-05-30 PROCEDURE — 3078F PR MOST RECENT DIASTOLIC BLOOD PRESSURE < 80 MM HG: ICD-10-PCS | Mod: CPTII,S$GLB,, | Performed by: INTERNAL MEDICINE

## 2023-05-30 PROCEDURE — 4010F PR ACE/ARB THEARPY RXD/TAKEN: ICD-10-PCS | Mod: CPTII,S$GLB,, | Performed by: INTERNAL MEDICINE

## 2023-05-30 PROCEDURE — 3288F FALL RISK ASSESSMENT DOCD: CPT | Mod: CPTII,S$GLB,, | Performed by: INTERNAL MEDICINE

## 2023-05-30 PROCEDURE — 1159F PR MEDICATION LIST DOCUMENTED IN MEDICAL RECORD: ICD-10-PCS | Mod: CPTII,S$GLB,, | Performed by: INTERNAL MEDICINE

## 2023-05-30 PROCEDURE — 3077F SYST BP >= 140 MM HG: CPT | Mod: CPTII,S$GLB,, | Performed by: INTERNAL MEDICINE

## 2023-05-30 PROCEDURE — 1159F MED LIST DOCD IN RCRD: CPT | Mod: CPTII,S$GLB,, | Performed by: INTERNAL MEDICINE

## 2023-05-30 PROCEDURE — 3078F DIAST BP <80 MM HG: CPT | Mod: CPTII,S$GLB,, | Performed by: INTERNAL MEDICINE

## 2023-05-30 PROCEDURE — 4010F ACE/ARB THERAPY RXD/TAKEN: CPT | Mod: CPTII,S$GLB,, | Performed by: INTERNAL MEDICINE

## 2023-05-30 PROCEDURE — 1101F PT FALLS ASSESS-DOCD LE1/YR: CPT | Mod: CPTII,S$GLB,, | Performed by: INTERNAL MEDICINE

## 2023-05-30 PROCEDURE — 99214 PR OFFICE/OUTPT VISIT, EST, LEVL IV, 30-39 MIN: ICD-10-PCS | Mod: S$GLB,,, | Performed by: INTERNAL MEDICINE

## 2023-05-30 PROCEDURE — 1126F PR PAIN SEVERITY QUANTIFIED, NO PAIN PRESENT: ICD-10-PCS | Mod: CPTII,S$GLB,, | Performed by: INTERNAL MEDICINE

## 2023-05-30 PROCEDURE — 1160F PR REVIEW ALL MEDS BY PRESCRIBER/CLIN PHARMACIST DOCUMENTED: ICD-10-PCS | Mod: CPTII,S$GLB,, | Performed by: INTERNAL MEDICINE

## 2023-05-30 PROCEDURE — 1126F AMNT PAIN NOTED NONE PRSNT: CPT | Mod: CPTII,S$GLB,, | Performed by: INTERNAL MEDICINE

## 2023-05-30 PROCEDURE — 99214 OFFICE O/P EST MOD 30 MIN: CPT | Mod: S$GLB,,, | Performed by: INTERNAL MEDICINE

## 2023-05-30 PROCEDURE — 1101F PR PT FALLS ASSESS DOC 0-1 FALLS W/OUT INJ PAST YR: ICD-10-PCS | Mod: CPTII,S$GLB,, | Performed by: INTERNAL MEDICINE

## 2023-05-30 PROCEDURE — 99999 PR PBB SHADOW E&M-EST. PATIENT-LVL III: CPT | Mod: PBBFAC,,, | Performed by: INTERNAL MEDICINE

## 2023-05-30 PROCEDURE — 3008F PR BODY MASS INDEX (BMI) DOCUMENTED: ICD-10-PCS | Mod: CPTII,S$GLB,, | Performed by: INTERNAL MEDICINE

## 2023-05-30 PROCEDURE — 99999 PR PBB SHADOW E&M-EST. PATIENT-LVL III: ICD-10-PCS | Mod: PBBFAC,,, | Performed by: INTERNAL MEDICINE

## 2023-05-30 NOTE — PROGRESS NOTES
Subjective:   Patient ID:  Romy Monae is a 72 y.o. female who presents for follow-up of Coronary Artery Disease      HPI    73 y/o female who presents for f/u CAD. Initially presented with CP, diagnosed with NSTEMI, had PCI to LAD. Compliant with meds. Feels tired and without energy. Previously not on BP meds. BP at home low 100's-110's. Denies CP, SOB/ESPINOZA, orthopnea, PND, syncope, palps, LE edema. She also has a hx of PHTN, DVT/PE AT3D, GERD, chronic AC with Eliquis.    5/30/2023:  No new symptoms since last visit. Fatigue improved since med changes. Walks on treadmill 3 times weekly for exercise. Checks BP at home regularly and average in 110-130's.    Review of Systems   Constitutional: Positive for malaise/fatigue.   HENT:  Negative for congestion.    Eyes:  Negative for blurred vision.   Cardiovascular:  Negative for chest pain, claudication, cyanosis, dyspnea on exertion, irregular heartbeat, leg swelling, near-syncope, orthopnea, palpitations, paroxysmal nocturnal dyspnea and syncope.   Respiratory:  Negative for shortness of breath.    Endocrine: Negative for polyuria.   Hematologic/Lymphatic: Negative for bleeding problem.   Skin:  Negative for itching and rash.   Musculoskeletal:  Negative for joint swelling, muscle cramps and muscle weakness.   Gastrointestinal:  Negative for abdominal pain, hematemesis, hematochezia, melena, nausea and vomiting.   Genitourinary:  Negative for dysuria and hematuria.   Neurological:  Negative for dizziness, focal weakness, headaches, light-headedness, loss of balance and weakness.   Psychiatric/Behavioral:  Negative for depression. The patient is not nervous/anxious.      Objective:   Physical Exam  Constitutional:       Appearance: She is well-developed.   HENT:      Head: Normocephalic and atraumatic.   Neck:      Vascular: No JVD.   Cardiovascular:      Rate and Rhythm: Normal rate and regular rhythm.      Pulses:           Carotid pulses are 2+ on the right side and  2+ on the left side.       Radial pulses are 2+ on the right side and 2+ on the left side.        Femoral pulses are 2+ on the right side and 2+ on the left side.       Dorsalis pedis pulses are 2+ on the right side and 2+ on the left side.        Posterior tibial pulses are 2+ on the right side and 2+ on the left side.      Heart sounds: Normal heart sounds.   Pulmonary:      Effort: Pulmonary effort is normal.      Breath sounds: Normal breath sounds.   Abdominal:      General: Bowel sounds are normal.      Palpations: Abdomen is soft.   Musculoskeletal:      Cervical back: Neck supple.   Skin:     General: Skin is warm and dry.   Neurological:      Mental Status: She is alert and oriented to person, place, and time.   Psychiatric:         Behavior: Behavior normal.         Thought Content: Thought content normal.       Assessment:      1. Coronary artery disease involving native coronary artery of native heart without angina pectoris    2. Presence of stent in LAD coronary artery    3. Hypertension, unspecified type    4. Hyperlipidemia, unspecified hyperlipidemia type    5. Pulmonary hypertension    6. History of DVT (deep vein thrombosis)    7. History of pulmonary embolus (PE)    8. Antithrombin III deficiency    9. Hypercoagulable state, primary    10. Gastroesophageal reflux disease with esophagitis without hemorrhage      71 y/o pt with hx and presentation as above. Doing well from a cardiac perspective and compensated from a HF perspective. Will refer to H/O to re-establish care. Needs to stay active. Discussed the etiology, evaluation, and management of CAD, PCI, DVT, PE. Discussed the importance of med compliance, heart healthy diet, and regular exercise.     Plan:     -Continue current medical management  -f/u in 6 months

## 2023-06-28 NOTE — PROGRESS NOTES
"PATIENT: Romy Monae  MRN: [id number]  DATE: 6/29/2023    Diagnosis:   1. History of DVT (deep vein thrombosis)    2. History of pulmonary embolus (PE)    3. Antithrombin III deficiency    4. Advance care planning      Chief Complaint: history of deep vein thrombosis    Oncologic History:      Oncologic History     Oncologic Treatment     Pathology       Subjective:    History of Present Illness: Ms. Monae is a 73 y.o. female who presents for evaluation and management of history of deep vein thrombosis/pulmonary embolism. She is referred by Dr. Burns. She had previously seen Dr. Brito.    - she was diagnosed with pulmonary embolism/deep vein thrombosis in June 2018    Information per Dr. Brito's note dated 12/19/19:  "She presented to the ED on 06/03/2018 with dyspnea of less than one week's duration that is progressively worsening without any clear precipitating factor and a CT chest showed her to have extensive bilateral acute-appearing pulmonary emboli involving segmental and subsegmental pulmonary arteries throughout all bilateral lobes.  Lower extremity ultrasound revealed acute nonocclusive DVT in the left common femoral vein.  She was placed on anticoagulation with Eliquis and sent home.  No known precipitating or risk factors for this episode.     She had a prior history of DVT and saddle pulmonary embolism in February 2011 without any precipitating episode at that time as well.  She was treated with warfarin for a year.     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  She is currently tolerating Eliquis well.  She will continue 5 mg twice daily dose indefinitely."    - today, she is doing well. She has no difficulty taking apixaban, although it's the only pill she takes twice a day.  - she had an NSTEMI at the end of September 2023  - She denies shortness of breath, chest pain, nausea, " vomiting, diarrhea, constipation.        Past medical, surgical, family, and social histories have been reviewed and updated below.    Past Medical History:   Past Medical History:   Diagnosis Date    Fibula fracture     6/17       Past Surgical History:   Past Surgical History:   Procedure Laterality Date    CORONARY ANGIOGRAPHY N/A 9/30/2022    Procedure: ANGIOGRAM, CORONARY ARTERY;  Surgeon: Bernardo Bernard MD;  Location: [facility] CATH LAB/EP;  Service: Cardiology;  Laterality: N/A;    LEFT HEART CATHETERIZATION Left 9/30/2022    Procedure: Left heart cath;  Surgeon: Bernardo Bernard MD;  Location: [facility] CATH LAB/EP;  Service: Cardiology;  Laterality: Left;    PERCUTANEOUS TRANSLUMINAL BALLOON ANGIOPLASTY OF CORONARY ARTERY  9/30/2022    Procedure: Angioplasty-coronary;  Surgeon: Bernardo Bernard MD;  Location: [facility] CATH LAB/EP;  Service: Cardiology;;       Family History: No family history on file.    Social History:  reports that she has never smoked. She has never used smokeless tobacco. She reports that she does not drink alcohol.    Allergies:  Review of patient's allergies indicates:  No Known Allergies    Medications:  Current Outpatient Medications   Medication Sig Dispense Refill    apixaban (ELIQUIS) 5 mg Tab Take 1 tablet (5 mg total) by mouth 2 (two) times daily. 180 tablet 3    atorvastatin (LIPITOR) 80 MG tablet Take 1 tablet (80 mg total) by mouth once daily. 90 tablet 3    clopidogreL (PLAVIX) 75 mg tablet Take 1 tablet (75 mg total) by mouth once daily. 30 tablet 11    latanoprost 0.005 % ophthalmic solution latanoprost 0.005 % eye drops   INSTILL 1 DROP IN BOTH EYES EVERY NIGHT AT BEDTIME      metoprolol succinate (TOPROL-XL) 25 MG 24 hr tablet Take 1 tablet (25 mg total) by mouth once daily. 90 tablet 3    multivit-min/iron/folic/lutein (CENTRUM SILVER WOMEN ORAL) Take by mouth once daily.       No current facility-administered medications for this visit.       Review of Systems   Constitutional:   Positive for fatigue.   HENT:  Negative for sore throat.    Eyes:  Negative for visual disturbance.   Respiratory:  Negative for cough and shortness of breath.    Cardiovascular:  Negative for chest pain.   Gastrointestinal:  Negative for abdominal pain, constipation, diarrhea, nausea and vomiting.   Genitourinary:  Negative for dysuria.   Musculoskeletal:  Negative for back pain.   Skin:  Negative for rash.   Neurological:  Negative for headaches.   Hematological:  Negative for adenopathy.   Psychiatric/Behavioral:  The patient is not nervous/anxious.      ECOG Performance Status:   ECOG SCORE    1 - Restricted in strenuous activity-ambulatory and able to carry out work of a light nature         Objective:      Vitals:   Vitals:    06/29/23 0947   BP: (!) 153/69   BP Location: Right arm   Patient Position: Sitting   Pulse: 71   Temp: 98.2 °F (36.8 °C)   TempSrc: Oral   SpO2: 97%   Weight: 82.3 kg (181 lb 7 oz)     BMI: Body mass index is 30.19 kg/m².    Physical Exam  Vitals and nursing note reviewed.   Constitutional:       Appearance: She is well-developed.   HENT:      Head: Normocephalic and atraumatic.   Eyes:      Pupils: Pupils are equal, round, and reactive to light.   Cardiovascular:      Rate and Rhythm: Normal rate and regular rhythm.   Pulmonary:      Effort: Pulmonary effort is normal.      Breath sounds: Normal breath sounds.   Abdominal:      General: Bowel sounds are normal.      Palpations: Abdomen is soft.   Musculoskeletal:         General: Normal range of motion.      Cervical back: Normal range of motion and neck supple.   Skin:     General: Skin is warm and dry.   Neurological:      Mental Status: She is alert and oriented to person, place, and time.   Psychiatric:         Behavior: Behavior normal.         Thought Content: Thought content normal.         Judgment: Judgment normal.       Laboratory Data:  Labs have been reviewed.    Lab Results   Component Value Date    WBC 9.52 09/30/2022     HGB 14.2 09/30/2022    HCT 42.3 09/30/2022    MCV 88 09/30/2022     09/30/2022           Imaging:    CTA chest (12/18/18):  Interval resolution of the pulmonary emboli.    Ultrasound bilateral lower extremities (6/3/18):  Acute, nonocclusive deep vein thrombus in the left common femoral vein.      CTA chest (6/3/18):  Extensive bilateral acute appearing pulmonary emboli as above.    Assessment:       1. History of DVT (deep vein thrombosis)    2. History of pulmonary embolus (PE)    3. Antithrombin III deficiency    4. Advance care planning           Plan:     History of deep vein thrombosis / history of pulmonary embolism / antithrombin 3 deficiency  - I have reviewed her chart  - she had previously seen Dr. Brito. He had recommended indefinite anticoagulation.  - she has had two episodes of pulmonary embolism  - I agree with Dr. Brito regarding indefinite anticoagulation. Regarding the choice between eliquis and xarelto, I think either option is reasonable. I will let Dr. Burns make that final determination.  - return to clinic as needed.    2. Advance Care Planning     Date: 06/29/2023    Power of   I initiated the process of voluntary advance care planning today and explained the importance of this process to the patient.  I introduced the concept of advance directives to the patient, as well. Then the patient received detailed information about the importance of designating a Health Care Power of  (HCPOA). She was also instructed to communicate with this person about their wishes for future healthcare, should she become sick and lose decision-making capacity. The patient has not previously appointed a HCPOA. After our discussion, the patient has not decided to complete a HCPOA.        - return to clinic as needed.    Zach Vargas M.D.  Hematology/Oncology  [facility] - [address], [address]  [place] [address]  Phone: [phone]  Fax: [phone]

## 2023-06-29 ENCOUNTER — OFFICE VISIT (OUTPATIENT)
Dept: HEMATOLOGY/ONCOLOGY | Facility: CLINIC | Age: 73
End: 2023-06-29
Payer: MEDICARE

## 2023-06-29 VITALS
DIASTOLIC BLOOD PRESSURE: 69 MMHG | TEMPERATURE: 98 F | SYSTOLIC BLOOD PRESSURE: 153 MMHG | WEIGHT: 181.44 LBS | HEART RATE: 71 BPM | BODY MASS INDEX: 30.19 KG/M2 | OXYGEN SATURATION: 97 %

## 2023-06-29 DIAGNOSIS — Z86.711 HISTORY OF PULMONARY EMBOLUS (PE): ICD-10-CM

## 2023-06-29 DIAGNOSIS — Z71.89 ADVANCE CARE PLANNING: ICD-10-CM

## 2023-06-29 DIAGNOSIS — Z86.718 HISTORY OF DVT (DEEP VEIN THROMBOSIS): Primary | ICD-10-CM

## 2023-06-29 DIAGNOSIS — D68.59 ANTITHROMBIN III DEFICIENCY: ICD-10-CM

## 2023-06-29 PROCEDURE — 99999 PR PBB SHADOW E&M-EST. PATIENT-LVL III: CPT | Mod: PBBFAC,,, | Performed by: INTERNAL MEDICINE

## 2023-06-29 PROCEDURE — 1158F ADVNC CARE PLAN TLK DOCD: CPT | Mod: CPTII,S$GLB,, | Performed by: INTERNAL MEDICINE

## 2023-06-29 PROCEDURE — 3077F SYST BP >= 140 MM HG: CPT | Mod: CPTII,S$GLB,, | Performed by: INTERNAL MEDICINE

## 2023-06-29 PROCEDURE — 3078F DIAST BP <80 MM HG: CPT | Mod: CPTII,S$GLB,, | Performed by: INTERNAL MEDICINE

## 2023-06-29 PROCEDURE — 3078F PR MOST RECENT DIASTOLIC BLOOD PRESSURE < 80 MM HG: ICD-10-PCS | Mod: CPTII,S$GLB,, | Performed by: INTERNAL MEDICINE

## 2023-06-29 PROCEDURE — 3077F PR MOST RECENT SYSTOLIC BLOOD PRESSURE >= 140 MM HG: ICD-10-PCS | Mod: CPTII,S$GLB,, | Performed by: INTERNAL MEDICINE

## 2023-06-29 PROCEDURE — 1101F PT FALLS ASSESS-DOCD LE1/YR: CPT | Mod: CPTII,S$GLB,, | Performed by: INTERNAL MEDICINE

## 2023-06-29 PROCEDURE — 1126F PR PAIN SEVERITY QUANTIFIED, NO PAIN PRESENT: ICD-10-PCS | Mod: CPTII,S$GLB,, | Performed by: INTERNAL MEDICINE

## 2023-06-29 PROCEDURE — 1101F PR PT FALLS ASSESS DOC 0-1 FALLS W/OUT INJ PAST YR: ICD-10-PCS | Mod: CPTII,S$GLB,, | Performed by: INTERNAL MEDICINE

## 2023-06-29 PROCEDURE — 1160F RVW MEDS BY RX/DR IN RCRD: CPT | Mod: CPTII,S$GLB,, | Performed by: INTERNAL MEDICINE

## 2023-06-29 PROCEDURE — 1160F PR REVIEW ALL MEDS BY PRESCRIBER/CLIN PHARMACIST DOCUMENTED: ICD-10-PCS | Mod: CPTII,S$GLB,, | Performed by: INTERNAL MEDICINE

## 2023-06-29 PROCEDURE — 1159F PR MEDICATION LIST DOCUMENTED IN MEDICAL RECORD: ICD-10-PCS | Mod: CPTII,S$GLB,, | Performed by: INTERNAL MEDICINE

## 2023-06-29 PROCEDURE — 1159F MED LIST DOCD IN RCRD: CPT | Mod: CPTII,S$GLB,, | Performed by: INTERNAL MEDICINE

## 2023-06-29 PROCEDURE — 3288F PR FALLS RISK ASSESSMENT DOCUMENTED: ICD-10-PCS | Mod: CPTII,S$GLB,, | Performed by: INTERNAL MEDICINE

## 2023-06-29 PROCEDURE — 4010F ACE/ARB THERAPY RXD/TAKEN: CPT | Mod: CPTII,S$GLB,, | Performed by: INTERNAL MEDICINE

## 2023-06-29 PROCEDURE — 4010F PR ACE/ARB THEARPY RXD/TAKEN: ICD-10-PCS | Mod: CPTII,S$GLB,, | Performed by: INTERNAL MEDICINE

## 2023-06-29 PROCEDURE — 99999 PR PBB SHADOW E&M-EST. PATIENT-LVL III: ICD-10-PCS | Mod: PBBFAC,,, | Performed by: INTERNAL MEDICINE

## 2023-06-29 PROCEDURE — 99204 PR OFFICE/OUTPT VISIT, NEW, LEVL IV, 45-59 MIN: ICD-10-PCS | Mod: S$GLB,,, | Performed by: INTERNAL MEDICINE

## 2023-06-29 PROCEDURE — 99204 OFFICE O/P NEW MOD 45 MIN: CPT | Mod: S$GLB,,, | Performed by: INTERNAL MEDICINE

## 2023-06-29 PROCEDURE — 1158F PR ADVANCE CARE PLANNING DISCUSS DOCUMENTED IN MEDICAL RECORD: ICD-10-PCS | Mod: CPTII,S$GLB,, | Performed by: INTERNAL MEDICINE

## 2023-06-29 PROCEDURE — 3288F FALL RISK ASSESSMENT DOCD: CPT | Mod: CPTII,S$GLB,, | Performed by: INTERNAL MEDICINE

## 2023-06-29 PROCEDURE — 1126F AMNT PAIN NOTED NONE PRSNT: CPT | Mod: CPTII,S$GLB,, | Performed by: INTERNAL MEDICINE

## 2023-07-28 ENCOUNTER — PATIENT MESSAGE (OUTPATIENT)
Dept: CARDIOLOGY | Facility: CLINIC | Age: 73
End: 2023-07-28
Payer: MEDICARE

## 2023-09-11 DIAGNOSIS — I10 HYPERTENSION, UNSPECIFIED TYPE: Primary | ICD-10-CM

## 2023-09-11 DIAGNOSIS — E78.5 HYPERLIPIDEMIA, UNSPECIFIED HYPERLIPIDEMIA TYPE: ICD-10-CM

## 2023-09-11 DIAGNOSIS — I26.90 ACUTE SEPTIC PULMONARY EMBOLISM WITHOUT ACUTE COR PULMONALE: ICD-10-CM

## 2023-09-11 RX ORDER — ATORVASTATIN CALCIUM 80 MG/1
80 TABLET, FILM COATED ORAL DAILY
Qty: 90 TABLET | Refills: 3 | Status: SHIPPED | OUTPATIENT
Start: 2023-09-11 | End: 2024-09-10

## 2023-09-11 RX ORDER — CLOPIDOGREL BISULFATE 75 MG/1
75 TABLET ORAL DAILY
Qty: 30 TABLET | Refills: 11 | Status: SHIPPED | OUTPATIENT
Start: 2023-09-11 | End: 2024-09-10

## 2023-09-11 NOTE — TELEPHONE ENCOUNTER
----- Message from Eugenio Franco sent at 9/11/2023 12:36 PM CDT -----  Contact: Pt  .Type:  Needs Medical Advice    Who Called: pt    Pharmacy name and phone #:  [facility]   Phone: [phone]  Fax:  [phone]  Would the patient rather a call back or a response via [facility]?  Call back  Best Call Back Number: [phone]   Additional Information: Pt. Needs a refill on her atorvastatin (LIPITOR) 80 MG tablet and  clopidogreL (PLAVIX) 75 mg tablet.  Pt. Is request 90 day supply

## 2023-10-12 ENCOUNTER — PATIENT MESSAGE (OUTPATIENT)
Dept: RESPIRATORY THERAPY | Facility: HOSPITAL | Age: 73
End: 2023-10-12
Payer: MEDICARE

## 2024-03-20 ENCOUNTER — OFFICE VISIT (OUTPATIENT)
Dept: CARDIOLOGY | Facility: CLINIC | Age: 74
End: 2024-03-20
Payer: MEDICARE

## 2024-03-20 VITALS
HEIGHT: 65 IN | OXYGEN SATURATION: 99 % | DIASTOLIC BLOOD PRESSURE: 70 MMHG | WEIGHT: 182 LBS | BODY MASS INDEX: 30.32 KG/M2 | HEART RATE: 62 BPM | SYSTOLIC BLOOD PRESSURE: 136 MMHG

## 2024-03-20 DIAGNOSIS — Z86.718 HISTORY OF DVT (DEEP VEIN THROMBOSIS): ICD-10-CM

## 2024-03-20 DIAGNOSIS — Z95.5 PRESENCE OF STENT IN LAD CORONARY ARTERY: ICD-10-CM

## 2024-03-20 DIAGNOSIS — D68.59 HYPERCOAGULABLE STATE, PRIMARY: ICD-10-CM

## 2024-03-20 DIAGNOSIS — E78.5 HYPERLIPIDEMIA, UNSPECIFIED HYPERLIPIDEMIA TYPE: ICD-10-CM

## 2024-03-20 DIAGNOSIS — I25.10 CORONARY ARTERY DISEASE INVOLVING NATIVE CORONARY ARTERY OF NATIVE HEART WITHOUT ANGINA PECTORIS: ICD-10-CM

## 2024-03-20 DIAGNOSIS — Z86.711 HISTORY OF PULMONARY EMBOLUS (PE): ICD-10-CM

## 2024-03-20 DIAGNOSIS — D68.59 ANTITHROMBIN III DEFICIENCY: ICD-10-CM

## 2024-03-20 DIAGNOSIS — I10 PRIMARY HYPERTENSION: Primary | ICD-10-CM

## 2024-03-20 PROBLEM — I27.20 PULMONARY HYPERTENSION: Status: RESOLVED | Noted: 2018-06-04 | Resolved: 2024-03-20

## 2024-03-20 PROBLEM — I26.99 ACUTE PULMONARY EMBOLISM WITHOUT ACUTE COR PULMONALE: Status: RESOLVED | Noted: 2019-12-19 | Resolved: 2024-03-20

## 2024-03-20 LAB
OHS QRS DURATION: 94 MS
OHS QTC CALCULATION: 399 MS

## 2024-03-20 PROCEDURE — 99999 PR PBB SHADOW E&M-EST. PATIENT-LVL III: CPT | Mod: PBBFAC,,, | Performed by: INTERNAL MEDICINE

## 2024-03-20 PROCEDURE — 99214 OFFICE O/P EST MOD 30 MIN: CPT | Mod: 25,S$GLB,, | Performed by: INTERNAL MEDICINE

## 2024-03-20 PROCEDURE — 93000 ELECTROCARDIOGRAM COMPLETE: CPT | Mod: S$GLB,,, | Performed by: INTERNAL MEDICINE

## 2024-03-20 RX ORDER — METOPROLOL SUCCINATE 25 MG/1
25 TABLET, EXTENDED RELEASE ORAL DAILY
Qty: 90 TABLET | Refills: 3 | Status: SHIPPED | OUTPATIENT
Start: 2024-03-20 | End: 2025-03-20

## 2024-03-20 NOTE — PROGRESS NOTES
Subjective:      Patient ID: Romy Monae is a 73 y.o. female.    Chief Complaint: Hyperlipidemia, Hypertension, and Coronary Artery Disease (S/p stent in LAD)    HPI:  Pt used to see Dr Burns    Pt underwent PTCA LAD 9/30/22 after experiencing retrosternal chest pains associated with pressure sensation in both arms as well as associated shortness of breath.    Pt has had no symptoms since the stent was placed.    Pt exercises at the gym 3 days a week    Review of Systems   Cardiovascular:  Negative for chest pain, claudication, dyspnea on exertion, irregular heartbeat, leg swelling, near-syncope, orthopnea, palpitations and syncope.      Sometimes pt feels a little lightheaded upon arising quickly.  This symptom is improved since Dr Burns stopped the losartan  (BP was 102 mm Hg systolic at that time)    No bleeding      Past Medical History:   Diagnosis Date    Fibula fracture     6/17        Past Surgical History:   Procedure Laterality Date    CORONARY ANGIOGRAPHY N/A 9/30/2022    Procedure: ANGIOGRAM, CORONARY ARTERY;  Surgeon: Bernardo Bernard MD;  Location: [facility] CATH LAB/EP;  Service: Cardiology;  Laterality: N/A;    LEFT HEART CATHETERIZATION Left 9/30/2022    Procedure: Left heart cath;  Surgeon: Bernardo Bernard MD;  Location: [facility] CATH LAB/EP;  Service: Cardiology;  Laterality: Left;    PERCUTANEOUS TRANSLUMINAL BALLOON ANGIOPLASTY OF CORONARY ARTERY  9/30/2022    Procedure: Angioplasty-coronary;  Surgeon: Bernardo Bernard MD;  Location: [facility] CATH LAB/EP;  Service: Cardiology;;       No family history on file.    Social History     Socioeconomic History    Marital status:    Tobacco Use    Smoking status: Never    Smokeless tobacco: Never   Substance and Sexual Activity    Alcohol use: No     Social Determinants of Health     Financial Resource Strain: Low Risk  (3/13/2024)    Overall Financial Resource Strain (CARDIA)     Difficulty of Paying Living Expenses: Not very hard   Food Insecurity: No  Food Insecurity (3/13/2024)    Hunger Vital Sign     Worried About Running Out of Food in the Last Year: Never true     Ran Out of Food in the Last Year: Never true   Transportation Needs: No Transportation Needs (3/13/2024)    PRAPARE - Transportation     Lack of Transportation (Medical): No     Lack of Transportation (Non-Medical): No   Physical Activity: Sufficiently Active (3/13/2024)    Exercise Vital Sign     Days of Exercise per Week: 3 days     Minutes of Exercise per Session: 60 min   Stress: No Stress Concern Present (3/13/2024)    [employer] [employer] - Occupational Stress Questionnaire     Feeling of Stress : Not at all   Social Connections: Unknown (3/13/2024)    Social Connection and Isolation Panel [NHANES]     Frequency of Communication with Friends and Family: Once a week     Frequency of Social Gatherings with Friends and Family: Once a week     Active Member of Clubs or Organizations: Yes     Attends Club or Organization Meetings: More than 4 times per year     Marital Status:    Housing Stability: Unknown (3/13/2024)    Housing Stability Vital Sign     Unstable Housing in the Last Year: No       Current Outpatient Medications on File Prior to Visit   Medication Sig Dispense Refill    apixaban (ELIQUIS) 5 mg Tab Take 1 tablet (5 mg total) by mouth 2 (two) times daily. 180 tablet 3    atorvastatin (LIPITOR) 80 MG tablet Take 1 tablet (80 mg total) by mouth once daily. 90 tablet 3    clopidogreL (PLAVIX) 75 mg tablet Take 1 tablet (75 mg total) by mouth once daily. 30 tablet 11    latanoprost 0.005 % ophthalmic solution latanoprost 0.005 % eye drops   INSTILL 1 DROP IN BOTH EYES EVERY NIGHT AT BEDTIME      multivit-min/iron/folic/lutein (CENTRUM SILVER WOMEN ORAL) Take by mouth once daily.      [DISCONTINUED] metoprolol succinate (TOPROL-XL) 25 MG 24 hr tablet Take 1 tablet (25 mg total) by mouth once daily. 90 tablet 3     No current facility-administered medications on  "file prior to visit.       Review of patient's allergies indicates:  No Known Allergies  Objective:     Vitals:    03/20/24 1114 03/20/24 1155   BP: (!) 145/71 136/70   BP Location: Left arm Left arm   Patient Position: Sitting Sitting   BP Method: Large (Automatic)    Pulse: 62    SpO2: 99%    Weight: 82.6 kg (181 lb 15.8 oz)    Height: 5' 5" (1.651 m)         Physical Exam  Constitutional:       Appearance: She is well-developed.   Eyes:      General: No scleral icterus.  Neck:      Vascular: No carotid bruit or JVD.   Cardiovascular:      Rate and Rhythm: Normal rate and regular rhythm.      Heart sounds: No murmur heard.     No gallop.   Pulmonary:      Breath sounds: Normal breath sounds.   Musculoskeletal:      Right lower leg: No edema.      Left lower leg: No edema.   Skin:     General: Skin is warm and dry.   Neurological:      Mental Status: She is alert and oriented to person, place, and time.   Psychiatric:         Behavior: Behavior normal.         Thought Content: Thought content normal.         Judgment: Judgment normal.              ECG today: NSR, WNL      No visits with results within 6 Month(s) from this visit.   Latest known visit with results is:   Admission on 09/29/2022, Discharged on 09/30/2022   Component Date Value Ref Range Status    WBC 09/30/2022 11.00  3.90 - 12.70 K/uL Final    RBC 09/30/2022 4.87  4.00 - 5.40 M/uL Final    Hemoglobin 09/30/2022 14.6  12.0 - 16.0 g/dL Final    Hematocrit 09/30/2022 44.1  37.0 - 48.5 % Final    MCV 09/30/2022 91  82 - 98 fL Final    MCH 09/30/2022 30.0  27.0 - 31.0 pg Final    MCHC 09/30/2022 33.1  32.0 - 36.0 g/dL Final    RDW 09/30/2022 12.8  11.5 - 14.5 % Final    Platelets 09/30/2022 280  150 - 450 K/uL Final    MPV 09/30/2022 10.9  9.2 - 12.9 fL Final    Immature Granulocytes 09/30/2022 0.3  0.0 - 0.5 % Final    Gran # (ANC) 09/30/2022 8.1 (H)  1.8 - 7.7 K/uL Final    Immature Grans (Abs) 09/30/2022 0.03  0.00 - 0.04 K/uL Final    Lymph # " 09/30/2022 1.7  1.0 - 4.8 K/uL Final    Mono # 09/30/2022 1.0  0.3 - 1.0 K/uL Final    Eos # 09/30/2022 0.2  0.0 - 0.5 K/uL Final    Baso # 09/30/2022 0.07  0.00 - 0.20 K/uL Final    nRBC 09/30/2022 0  0 /100 WBC Final    Gran % 09/30/2022 73.3 (H)  38.0 - 73.0 % Final    Lymph % 09/30/2022 15.4 (L)  18.0 - 48.0 % Final    Mono % 09/30/2022 9.0  4.0 - 15.0 % Final    Eosinophil % 09/30/2022 1.4  0.0 - 8.0 % Final    Basophil % 09/30/2022 0.6  0.0 - 1.9 % Final    Differential Method 09/30/2022 Automated   Final    Sodium 09/30/2022 140  136 - 145 mmol/L Final    Potassium 09/30/2022 4.0  3.5 - 5.1 mmol/L Final    Chloride 09/30/2022 103  95 - 110 mmol/L Final    CO2 09/30/2022 23  23 - 29 mmol/L Final    Glucose 09/30/2022 116 (H)  70 - 110 mg/dL Final    BUN 09/30/2022 18  8 - 23 mg/dL Final    Creatinine 09/30/2022 1.1  0.5 - 1.4 mg/dL Final    Calcium 09/30/2022 9.9  8.7 - 10.5 mg/dL Final    Total Protein 09/30/2022 7.9  6.0 - 8.4 g/dL Final    Albumin 09/30/2022 4.0  3.5 - 5.2 g/dL Final    Total Bilirubin 09/30/2022 0.5  0.1 - 1.0 mg/dL Final    Alkaline Phosphatase 09/30/2022 82  55 - 135 U/L Final    AST 09/30/2022 36  10 - 40 U/L Final    ALT 09/30/2022 24  10 - 44 U/L Final    Anion Gap 09/30/2022 14  8 - 16 mmol/L Final    eGFR 09/30/2022 53 (A)  >60 mL/min/1.73 m^2 Final    Troponin I 09/30/2022 1.712 (H)  0.000 - 0.026 ng/mL Final    BNP 09/30/2022 70  0 - 99 pg/mL Final    aPTT 09/30/2022 27.9  21.0 - 32.0 sec Final    Prothrombin Time 09/30/2022 10.7  9.0 - 12.5 sec Final    INR 09/30/2022 1.0  0.8 - 1.2 Final    WBC 09/30/2022 11.02  3.90 - 12.70 K/uL Final    RBC 09/30/2022 4.71  4.00 - 5.40 M/uL Final    Hemoglobin 09/30/2022 14.0  12.0 - 16.0 g/dL Final    Hematocrit 09/30/2022 42.6  37.0 - 48.5 % Final    MCV 09/30/2022 90  82 - 98 fL Final    MCH 09/30/2022 29.7  27.0 - 31.0 pg Final    MCHC 09/30/2022 32.9  32.0 - 36.0 g/dL Final    RDW 09/30/2022 12.7  11.5 - 14.5 % Final    Platelets  09/30/2022 267  150 - 450 K/uL Final    MPV 09/30/2022 10.7  9.2 - 12.9 fL Final    Immature Granulocytes 09/30/2022 0.4  0.0 - 0.5 % Final    Gran # (ANC) 09/30/2022 8.3 (H)  1.8 - 7.7 K/uL Final    Immature Grans (Abs) 09/30/2022 0.04  0.00 - 0.04 K/uL Final    Lymph # 09/30/2022 1.6  1.0 - 4.8 K/uL Final    Mono # 09/30/2022 0.9  0.3 - 1.0 K/uL Final    Eos # 09/30/2022 0.1  0.0 - 0.5 K/uL Final    Baso # 09/30/2022 0.06  0.00 - 0.20 K/uL Final    nRBC 09/30/2022 0  0 /100 WBC Final    Gran % 09/30/2022 75.2 (H)  38.0 - 73.0 % Final    Lymph % 09/30/2022 14.7 (L)  18.0 - 48.0 % Final    Mono % 09/30/2022 8.3  4.0 - 15.0 % Final    Eosinophil % 09/30/2022 0.9  0.0 - 8.0 % Final    Basophil % 09/30/2022 0.5  0.0 - 1.9 % Final    Differential Method 09/30/2022 Automated   Final    WBC 09/30/2022 9.52  3.90 - 12.70 K/uL Final    RBC 09/30/2022 4.79  4.00 - 5.40 M/uL Final    Hemoglobin 09/30/2022 14.2  12.0 - 16.0 g/dL Final    Hematocrit 09/30/2022 42.3  37.0 - 48.5 % Final    MCV 09/30/2022 88  82 - 98 fL Final    MCH 09/30/2022 29.6  27.0 - 31.0 pg Final    MCHC 09/30/2022 33.6  32.0 - 36.0 g/dL Final    RDW 09/30/2022 12.9  11.5 - 14.5 % Final    Platelets 09/30/2022 287  150 - 450 K/uL Final    MPV 09/30/2022 11.1  9.2 - 12.9 fL Final    Immature Granulocytes 09/30/2022 0.4  0.0 - 0.5 % Final    Gran # (ANC) 09/30/2022 6.9  1.8 - 7.7 K/uL Final    Immature Grans (Abs) 09/30/2022 0.04  0.00 - 0.04 K/uL Final    Lymph # 09/30/2022 1.9  1.0 - 4.8 K/uL Final    Mono # 09/30/2022 0.6  0.3 - 1.0 K/uL Final    Eos # 09/30/2022 0.1  0.0 - 0.5 K/uL Final    Baso # 09/30/2022 0.05  0.00 - 0.20 K/uL Final    nRBC 09/30/2022 0  0 /100 WBC Final    Gran % 09/30/2022 72.6  38.0 - 73.0 % Final    Lymph % 09/30/2022 19.9  18.0 - 48.0 % Final    Mono % 09/30/2022 5.9  4.0 - 15.0 % Final    Eosinophil % 09/30/2022 0.7  0.0 - 8.0 % Final    Basophil % 09/30/2022 0.5  0.0 - 1.9 % Final    Differential Method 09/30/2022 Automated    Final    Sodium 09/30/2022 141  136 - 145 mmol/L Final    Potassium 09/30/2022 4.2  3.5 - 5.1 mmol/L Final    Chloride 09/30/2022 106  95 - 110 mmol/L Final    CO2 09/30/2022 21 (L)  23 - 29 mmol/L Final    Glucose 09/30/2022 115 (H)  70 - 110 mg/dL Final    BUN 09/30/2022 15  8 - 23 mg/dL Final    Creatinine 09/30/2022 0.9  0.5 - 1.4 mg/dL Final    Calcium 09/30/2022 9.5  8.7 - 10.5 mg/dL Final    Anion Gap 09/30/2022 14  8 - 16 mmol/L Final    eGFR 09/30/2022 >60  >60 mL/min/1.73 m^2 Final    Magnesium 09/30/2022 1.9  1.6 - 2.6 mg/dL Final    Troponin I 09/30/2022 5.374 (H)  0.000 - 0.026 ng/mL Final    POC Rapid COVID 09/30/2022 Negative  Negative Final     Acceptable 09/30/2022 Yes   Final    aPTT 09/30/2022 39.4 (H)  21.0 - 32.0 sec Final    Troponin I 09/30/2022 6.522 (H)  0.000 - 0.026 ng/mL Final    POC ACTIVATED CLOTTING TIME K 09/30/2022 132  74 - 137 sec Final    Sample 09/30/2022 ARTERIAL   Final    POC ACTIVATED CLOTTING TIME K 09/30/2022 254 (H)  74 - 137 sec Final    Sample 09/30/2022 ARTERIAL   Final    POC ACTIVATED CLOTTING TIME K 09/30/2022 219 (H)  74 - 137 sec Final    Sample 09/30/2022 ARTERIAL   Final    aPTT 09/30/2022 27.1  21.0 - 32.0 sec Final    BSA 09/30/2022 2.01  m2 Final    IVC diameter 09/30/2022 1.73  cm Final    Left Ventricular Outflow Tract Daysi* 09/30/2022 0.84  cm/s Final    Left Ventricular Outflow Tract Daysi* 09/30/2022 3.09  mmHg Final    LVIDd 09/30/2022 3.98  3.5 - 6.0 cm Final    IVS 09/30/2022 1.19 (A)  0.6 - 1.1 cm Final    Posterior Wall 09/30/2022 1.30 (A)  0.6 - 1.1 cm Final    LVIDs 09/30/2022 2.29  2.1 - 4.0 cm Final    FS 09/30/2022 42  28 - 44 % Final    LV mass 09/30/2022 173.49  g Final    LA size 09/30/2022 3.62  cm Final    RVDD 09/30/2022 2.66  cm Final    Left Ventricle Relative Wall Thick* 09/30/2022 0.65  cm Final    AV mean gradient 09/30/2022 6  mmHg Final    AV valve area 09/30/2022 2.48  cm2 Final    AV Velocity Ratio 09/30/2022 0.79    "Final    AV index (prosthetic) 09/30/2022 0.76   Final    MV mean gradient 09/30/2022 1  mmHg Final    MV valve area p 1/2 method 09/30/2022 2.41  cm2 Final    MV valve area by continuity eq 09/30/2022 3.29  cm2 Final    E/A ratio 09/30/2022 1.43   Final    E wave deceleration time 09/30/2022 306.20  msec Final    IVRT 09/30/2022 98.95  msec Final    LVOT diameter 09/30/2022 2.04  cm Final    LVOT area 09/30/2022 3.3  cm2 Final    LVOT peak yasir 09/30/2022 1.18  m/s Final    LVOT peak VTI 09/30/2022 25.10  cm Final    Ao peak yasir 09/30/2022 1.49  m/s Final    Ao VTI 09/30/2022 33.0  cm Final    Mr max yasir 09/30/2022 4.24  m/s Final    LVOT stroke volume 09/30/2022 82.00  cm3 Final    AV peak gradient 09/30/2022 9  mmHg Final    MV peak gradient 09/30/2022 3  mmHg Final    MV Peak E Yasir 09/30/2022 0.70  m/s Final    TR Max Yasir 09/30/2022 2.77  m/s Final    MV VTI 09/30/2022 24.9  cm Final    MV stenosis pressure 1/2 time 09/30/2022 91.22  ms Final    MV Peak A Yasir 09/30/2022 0.49  m/s Final    LV Systolic Volume 09/30/2022 17.97  mL Final    LV Systolic Volume Index 09/30/2022 9.2  mL/m2 Final    LV Diastolic Volume 09/30/2022 69.26  mL Final    LV Diastolic Volume Index 09/30/2022 35.34  mL/m2 Final    LV Mass Index 09/30/2022 89  g/m2 Final    RA Major Axis 09/30/2022 4.37  cm Final    Left Atrium Minor Axis 09/30/2022 5.17  cm Final    Left Atrium Major Axis 09/30/2022 5.84  cm Final    Triscuspid Valve Regurgitation Pea* 09/30/2022 31  mmHg Final    LA Volume Index (Mod) 09/30/2022 31.9  mL/m2 Final    LA volume (mod) 09/30/2022 62.52  cm3 Final    Right Atrial Pressure (from IVC) 09/30/2022 3  mmHg Final    EF 09/30/2022 65  % Final    TV resting pulmonary artery pressu* 09/30/2022 34  mmHg Final   (  `Accession #: [id number]  Transthoracic echo (TTE) complete    Height:  5' 5" (1.651 m)   Weight:  88.5 kg (195 lb)   Blood Pressure:  128/57    Date of Study:  9/30/22   Ordering Provider:  Ricardo Chance, " "MD   Clinical Indications:  NSTEMI (non-ST elevated myocardial infarction) [I21.4 (ICD-10-CM)]       Interpreting Physicians  Performing Staff   Sully Ambrose MD Tech:  Patty Driver        Reason for Exam  Priority: Pending Discharge  Dx: NSTEMI (non-ST elevated myocardial infarction) [I21.4 (ICD-10-CM)]     View Images Vital Vitrea     Show images for Echo  Summary    Concentric remodeling and normal systolic function.  The estimated ejection fraction is 65%.  Left ventricular endocardium not well visualized. Likely apical akinesis, mild anterior, and omer-septal wall hypokinesis  Normal left ventricular diastolic function.  Normal right ventricular size with normal right ventricular systolic function.  Normal central venous pressure (3 mmHg).  The estimated PA systolic pressure is 34 mmHg.  Trivial pericardial effusion.     Vitals    Height Weight BMI (Calculated) BSA (Calculated - sq m) BP Pulse   5' 5" (1.651 m)    128/57 87        Accession #: [id number]  Cardiac catheterization    Height:  5' 5" (1.651 m)   Weight:  88.5 kg (195 lb)   Blood Pressure:  150/67   Heart Rate:  90    Date of Study:  9/30/22   Ordering Provider:  Bernardo Bernard MD   Referring:  Self, Aaareferral   Clinical Indications:  Shortness of breath [R06.02 (ICD-10-CM)], SOB (shortness of breath) [R06.02 (ICD-10-CM)], Chest pain [R07.9 (ICD-10-CM)], NSTEMI (non-ST elevated myocardial infarction) [I21.4 (ICD-10-CM)]       Performing Physician  Performing Staff   Primary:  Bernardo Bernard MD   Fellow:  Darlene Cain MD    Monitoring RN:  Jez Schmidt RN   Scrub Person:  Lashell Hidalgo, RT   Documenter:  Cristy Harmon RT        Patient Information    Name MRN Description   [facility] [id number] 72 y.o. female     Physicians    Panel Physicians Referring Physician Case Authorizing Physician   Bernardo Bernard MD (Primary) Self, Aaareferral Juni Torre MD Madonis, Stephanie Maria, MD (Fellow)   "     Indications    Shortness of breath [R06.02 (ICD-10-CM)]   SOB (shortness of breath) [R06.02 (ICD-10-CM)]   Chest pain [R07.9 (ICD-10-CM)]   NSTEMI (non-ST elevated myocardial infarction) [I21.4 (ICD-10-CM)]     Summary         The pre-procedure left ventricular end diastolic pressure was 10.        S/p left heart cath for NSTEMI        Patent LM, LCX, and RCA with luminal irregularities  95% proximal LAD stenosis treated with 3.5 x 18 Resolute [place] LAURA  IVUS guided revascularization   LVEDP 10 mmHg         Plan:        DAPT with aspirin and plavix  Intense statin therapy  Echo to assess EF      Assessment:     1. Primary hypertension    2. Hyperlipidemia, unspecified hyperlipidemia type    3. Presence of stent in LAD coronary artery    4. Coronary artery disease involving native coronary artery of native heart without angina pectoris    5. History of pulmonary embolus (PE)    6. History of DVT (deep vein thrombosis)    7. Antithrombin III deficiency    8. Hypercoagulable state, primary      Plan:   Romy was seen today for hyperlipidemia, hypertension and coronary artery disease.    Diagnoses and all orders for this visit:    Primary hypertension  -     IN OFFICE EKG 12-LEAD (to Muse)  -     metoprolol succinate (TOPROL-XL) 25 MG 24 hr tablet; Take 1 tablet (25 mg total) by mouth once daily.  -     CBC Auto Differential; Future  -     Comprehensive Metabolic Panel; Future  -     Lipid Panel; Future  -     TSH; Future    Hyperlipidemia, unspecified hyperlipidemia type  -     IN OFFICE EKG 12-LEAD (to Muse)  -     metoprolol succinate (TOPROL-XL) 25 MG 24 hr tablet; Take 1 tablet (25 mg total) by mouth once daily.  -     CBC Auto Differential; Future  -     Comprehensive Metabolic Panel; Future  -     Lipid Panel; Future  -     TSH; Future    Presence of stent in LAD coronary artery  -     IN OFFICE EKG 12-LEAD (to Muse)  -     metoprolol succinate (TOPROL-XL) 25 MG 24 hr tablet; Take 1 tablet (25 mg total) by mouth  once daily.  -     CBC Auto Differential; Future  -     Comprehensive Metabolic Panel; Future  -     Lipid Panel; Future  -     TSH; Future    Coronary artery disease involving native coronary artery of native heart without angina pectoris  -     IN OFFICE EKG 12-LEAD (to Muse)  -     metoprolol succinate (TOPROL-XL) 25 MG 24 hr tablet; Take 1 tablet (25 mg total) by mouth once daily.  -     CBC Auto Differential; Future  -     Comprehensive Metabolic Panel; Future  -     Lipid Panel; Future  -     TSH; Future    History of pulmonary embolus (PE)  -     IN OFFICE EKG 12-LEAD (to Muse)  -     metoprolol succinate (TOPROL-XL) 25 MG 24 hr tablet; Take 1 tablet (25 mg total) by mouth once daily.  -     CBC Auto Differential; Future  -     Comprehensive Metabolic Panel; Future  -     Lipid Panel; Future  -     TSH; Future    History of DVT (deep vein thrombosis)  -     IN OFFICE EKG 12-LEAD (to Muse)  -     metoprolol succinate (TOPROL-XL) 25 MG 24 hr tablet; Take 1 tablet (25 mg total) by mouth once daily.  -     CBC Auto Differential; Future  -     Comprehensive Metabolic Panel; Future  -     Lipid Panel; Future  -     TSH; Future    Antithrombin III deficiency  -     IN OFFICE EKG 12-LEAD (to Muse)  -     metoprolol succinate (TOPROL-XL) 25 MG 24 hr tablet; Take 1 tablet (25 mg total) by mouth once daily.  -     CBC Auto Differential; Future  -     Comprehensive Metabolic Panel; Future  -     Lipid Panel; Future  -     TSH; Future    Hypercoagulable state, primary  -     IN OFFICE EKG 12-LEAD (to Muse)  -     metoprolol succinate (TOPROL-XL) 25 MG 24 hr tablet; Take 1 tablet (25 mg total) by mouth once daily.  -     CBC Auto Differential; Future  -     Comprehensive Metabolic Panel; Future  -     Lipid Panel; Future  -     TSH; Future     Pt is doing well on current regimen    Will consider stopping the clopidogrel and continue the Eliquis alone.  However, due to pt's hypercoagulable state, will continue both the  Plavix and Eliquis at this time    Same meds    Repeat lab    Follow up in about 6 months (around 9/20/2024).

## 2024-03-21 ENCOUNTER — TELEPHONE (OUTPATIENT)
Dept: CARDIOLOGY | Facility: CLINIC | Age: 74
End: 2024-03-21
Payer: MEDICARE

## 2024-03-21 NOTE — TELEPHONE ENCOUNTER
I spoke with pt  Lab Ok .   LDL is good but not at target of 70.  Recommend adding Zetia 10 mg to current regimen.  Pt declines and prefers to follow a stricter diet.  Discussed case with my interventional colleague who feels it is safest to continue both the Plavix and Eliquis in order to avert stent thrombosis.  Discussed with pt.   DISPLAY PLAN FREE TEXT

## 2024-04-24 ENCOUNTER — HOSPITAL ENCOUNTER (EMERGENCY)
Facility: HOSPITAL | Age: 74
Discharge: HOME | End: 2024-04-24
Attending: EMERGENCY MEDICINE
Payer: MEDICARE

## 2024-04-24 ENCOUNTER — ANESTHESIA (OUTPATIENT)
Dept: SURGERY | Facility: HOSPITAL | Age: 74
End: 2024-04-24
Payer: MEDICARE

## 2024-04-24 ENCOUNTER — ANESTHESIA EVENT (OUTPATIENT)
Dept: SURGERY | Facility: HOSPITAL | Age: 74
End: 2024-04-24
Payer: MEDICARE

## 2024-04-24 VITALS
HEIGHT: 65 IN | WEIGHT: 180 LBS | OXYGEN SATURATION: 97 % | HEART RATE: 72 BPM | RESPIRATION RATE: 15 BRPM | BODY MASS INDEX: 29.99 KG/M2 | SYSTOLIC BLOOD PRESSURE: 159 MMHG | DIASTOLIC BLOOD PRESSURE: 70 MMHG | TEMPERATURE: 98 F

## 2024-04-24 DIAGNOSIS — Z01.818 PRE-OP EVALUATION: ICD-10-CM

## 2024-04-24 DIAGNOSIS — S42.92XA CLOSED FRACTURE DISLOCATION OF JOINT OF LEFT SHOULDER GIRDLE, INITIAL ENCOUNTER: Primary | ICD-10-CM

## 2024-04-24 DIAGNOSIS — S42.92XA TRAUMATIC CLOSED DISPLACED FRACTURE OF LEFT SHOULDER WITH ANTERIOR DISLOCATION, INITIAL ENCOUNTER: ICD-10-CM

## 2024-04-24 DIAGNOSIS — M79.602 LEFT ARM PAIN: ICD-10-CM

## 2024-04-24 DIAGNOSIS — M25.512 LEFT SHOULDER PAIN, UNSPECIFIED CHRONICITY: Primary | ICD-10-CM

## 2024-04-24 DIAGNOSIS — S43.035A CLOSED INFERIOR DISLOCATION OF LEFT SHOULDER, INITIAL ENCOUNTER: ICD-10-CM

## 2024-04-24 LAB
ALBUMIN SERPL BCP-MCNC: 3.7 G/DL (ref 3.5–5.2)
ALP SERPL-CCNC: 95 U/L (ref 55–135)
ALT SERPL W/O P-5'-P-CCNC: 31 U/L (ref 10–44)
ANION GAP SERPL CALC-SCNC: 15 MMOL/L (ref 8–16)
AST SERPL-CCNC: 30 U/L (ref 10–40)
BILIRUB SERPL-MCNC: 0.9 MG/DL (ref 0.1–1)
BUN SERPL-MCNC: 16 MG/DL (ref 8–23)
CALCIUM SERPL-MCNC: 9.1 MG/DL (ref 8.7–10.5)
CHLORIDE SERPL-SCNC: 106 MMOL/L (ref 95–110)
CO2 SERPL-SCNC: 18 MMOL/L (ref 23–29)
CREAT SERPL-MCNC: 0.9 MG/DL (ref 0.5–1.4)
ERYTHROCYTE [DISTWIDTH] IN BLOOD BY AUTOMATED COUNT: 13.4 % (ref 11.5–14.5)
EST. GFR  (NO RACE VARIABLE): >60 ML/MIN/1.73 M^2
GLUCOSE SERPL-MCNC: 156 MG/DL (ref 70–110)
HCT VFR BLD AUTO: 37.8 % (ref 37–48.5)
HGB BLD-MCNC: 13.1 G/DL (ref 12–16)
MCH RBC QN AUTO: 30.3 PG (ref 27–31)
MCHC RBC AUTO-ENTMCNC: 34.7 G/DL (ref 32–36)
MCV RBC AUTO: 88 FL (ref 82–98)
PLATELET # BLD AUTO: 230 K/UL (ref 150–450)
PMV BLD AUTO: 11.2 FL (ref 9.2–12.9)
POTASSIUM SERPL-SCNC: 3.6 MMOL/L (ref 3.5–5.1)
PROT SERPL-MCNC: 7.1 G/DL (ref 6–8.4)
RBC # BLD AUTO: 4.32 M/UL (ref 4–5.4)
SODIUM SERPL-SCNC: 139 MMOL/L (ref 136–145)
WBC # BLD AUTO: 11.72 K/UL (ref 3.9–12.7)

## 2024-04-24 PROCEDURE — 96376 TX/PRO/DX INJ SAME DRUG ADON: CPT | Mod: 59

## 2024-04-24 PROCEDURE — D9220A PRA ANESTHESIA: Mod: ANES,,, | Performed by: STUDENT IN AN ORGANIZED HEALTH CARE EDUCATION/TRAINING PROGRAM

## 2024-04-24 PROCEDURE — 23655 CLTX SHO DSLC W/MNPJ W/ANES: CPT | Mod: LT,,, | Performed by: ORTHOPAEDIC SURGERY

## 2024-04-24 PROCEDURE — 63600175 PHARM REV CODE 636 W HCPCS: Performed by: EMERGENCY MEDICINE

## 2024-04-24 PROCEDURE — 99284 EMERGENCY DEPT VISIT MOD MDM: CPT | Mod: 57,,, | Performed by: ORTHOPAEDIC SURGERY

## 2024-04-24 PROCEDURE — C1729 CATH, DRAINAGE: HCPCS | Performed by: ORTHOPAEDIC SURGERY

## 2024-04-24 PROCEDURE — 96374 THER/PROPH/DIAG INJ IV PUSH: CPT | Mod: 59

## 2024-04-24 PROCEDURE — 63600175 PHARM REV CODE 636 W HCPCS: Performed by: NURSE ANESTHETIST, CERTIFIED REGISTERED

## 2024-04-24 PROCEDURE — 23665 CLTX SHO DSLC FX GR HMRL TBR: CPT | Mod: LT

## 2024-04-24 PROCEDURE — 99285 EMERGENCY DEPT VISIT HI MDM: CPT | Mod: 25

## 2024-04-24 PROCEDURE — 36000704 HC OR TIME LEV I 1ST 15 MIN: Performed by: ORTHOPAEDIC SURGERY

## 2024-04-24 PROCEDURE — 37000008 HC ANESTHESIA 1ST 15 MINUTES: Performed by: ORTHOPAEDIC SURGERY

## 2024-04-24 PROCEDURE — 25000003 PHARM REV CODE 250: Performed by: STUDENT IN AN ORGANIZED HEALTH CARE EDUCATION/TRAINING PROGRAM

## 2024-04-24 PROCEDURE — 71000033 HC RECOVERY, INTIAL HOUR: Performed by: ORTHOPAEDIC SURGERY

## 2024-04-24 PROCEDURE — 80053 COMPREHEN METABOLIC PANEL: CPT | Performed by: EMERGENCY MEDICINE

## 2024-04-24 PROCEDURE — 37000009 HC ANESTHESIA EA ADD 15 MINS: Performed by: ORTHOPAEDIC SURGERY

## 2024-04-24 PROCEDURE — 85027 COMPLETE CBC AUTOMATED: CPT | Performed by: EMERGENCY MEDICINE

## 2024-04-24 PROCEDURE — D9220A PRA ANESTHESIA: Mod: CRNA,,, | Performed by: NURSE ANESTHETIST, CERTIFIED REGISTERED

## 2024-04-24 PROCEDURE — 36000705 HC OR TIME LEV I EA ADD 15 MIN: Performed by: ORTHOPAEDIC SURGERY

## 2024-04-24 PROCEDURE — 25000003 PHARM REV CODE 250: Performed by: NURSE ANESTHETIST, CERTIFIED REGISTERED

## 2024-04-24 RX ORDER — LIDOCAINE HYDROCHLORIDE 20 MG/ML
INJECTION, SOLUTION EPIDURAL; INFILTRATION; INTRACAUDAL; PERINEURAL
Status: DISCONTINUED | OUTPATIENT
Start: 2024-04-24 | End: 2024-04-24

## 2024-04-24 RX ORDER — DEXAMETHASONE SODIUM PHOSPHATE 4 MG/ML
INJECTION, SOLUTION INTRA-ARTICULAR; INTRALESIONAL; INTRAMUSCULAR; INTRAVENOUS; SOFT TISSUE
Status: DISCONTINUED | OUTPATIENT
Start: 2024-04-24 | End: 2024-04-24

## 2024-04-24 RX ORDER — ONDANSETRON 4 MG/1
4 TABLET, ORALLY DISINTEGRATING ORAL EVERY 8 HOURS PRN
Qty: 30 TABLET | Refills: 0 | Status: SHIPPED | OUTPATIENT
Start: 2024-04-24 | End: 2024-05-21

## 2024-04-24 RX ORDER — HYDROCODONE BITARTRATE AND ACETAMINOPHEN 7.5; 325 MG/1; MG/1
1 TABLET ORAL
COMMUNITY
End: 2024-04-24

## 2024-04-24 RX ORDER — PROPOFOL 10 MG/ML
VIAL (ML) INTRAVENOUS
Status: DISCONTINUED | OUTPATIENT
Start: 2024-04-24 | End: 2024-04-24

## 2024-04-24 RX ORDER — METHOCARBAMOL 750 MG/1
TABLET, FILM COATED ORAL
COMMUNITY
End: 2024-04-24

## 2024-04-24 RX ORDER — ONDANSETRON HYDROCHLORIDE 2 MG/ML
INJECTION, SOLUTION INTRAVENOUS
Status: DISCONTINUED | OUTPATIENT
Start: 2024-04-24 | End: 2024-04-24

## 2024-04-24 RX ORDER — ONDANSETRON HYDROCHLORIDE 2 MG/ML
4 INJECTION, SOLUTION INTRAVENOUS DAILY PRN
Status: DISCONTINUED | OUTPATIENT
Start: 2024-04-24 | End: 2024-04-24 | Stop reason: HOSPADM

## 2024-04-24 RX ORDER — DIPHENHYDRAMINE HYDROCHLORIDE 50 MG/ML
INJECTION INTRAMUSCULAR; INTRAVENOUS
Status: DISCONTINUED | OUTPATIENT
Start: 2024-04-24 | End: 2024-04-24

## 2024-04-24 RX ORDER — FENTANYL CITRATE 50 UG/ML
INJECTION, SOLUTION INTRAMUSCULAR; INTRAVENOUS
Status: DISCONTINUED | OUTPATIENT
Start: 2024-04-24 | End: 2024-04-24

## 2024-04-24 RX ORDER — MORPHINE SULFATE 4 MG/ML
4 INJECTION, SOLUTION INTRAMUSCULAR; INTRAVENOUS
Status: COMPLETED | OUTPATIENT
Start: 2024-04-24 | End: 2024-04-24

## 2024-04-24 RX ORDER — ROCURONIUM BROMIDE 10 MG/ML
INJECTION, SOLUTION INTRAVENOUS
Status: DISCONTINUED | OUTPATIENT
Start: 2024-04-24 | End: 2024-04-24

## 2024-04-24 RX ORDER — HYDROCODONE BITARTRATE AND ACETAMINOPHEN 5; 325 MG/1; MG/1
1 TABLET ORAL EVERY 6 HOURS PRN
Qty: 28 TABLET | Refills: 0 | Status: ON HOLD | OUTPATIENT
Start: 2024-04-24 | End: 2024-05-13 | Stop reason: HOSPADM

## 2024-04-24 RX ORDER — SUCCINYLCHOLINE CHLORIDE 20 MG/ML
INJECTION INTRAMUSCULAR; INTRAVENOUS
Status: DISCONTINUED | OUTPATIENT
Start: 2024-04-24 | End: 2024-04-24

## 2024-04-24 RX ORDER — HYDROMORPHONE HYDROCHLORIDE 2 MG/ML
0.2 INJECTION, SOLUTION INTRAMUSCULAR; INTRAVENOUS; SUBCUTANEOUS EVERY 5 MIN PRN
Status: DISCONTINUED | OUTPATIENT
Start: 2024-04-24 | End: 2024-04-24 | Stop reason: HOSPADM

## 2024-04-24 RX ORDER — OXYCODONE HYDROCHLORIDE 5 MG/1
5 TABLET ORAL
Status: DISCONTINUED | OUTPATIENT
Start: 2024-04-24 | End: 2024-04-24 | Stop reason: HOSPADM

## 2024-04-24 RX ADMIN — MORPHINE SULFATE 4 MG: 4 INJECTION INTRAVENOUS at 11:04

## 2024-04-24 RX ADMIN — DEXAMETHASONE SODIUM PHOSPHATE 4 MG: 4 INJECTION, SOLUTION INTRA-ARTICULAR; INTRALESIONAL; INTRAMUSCULAR; INTRAVENOUS; SOFT TISSUE at 03:04

## 2024-04-24 RX ADMIN — DIPHENHYDRAMINE HYDROCHLORIDE 6.25 MG: 50 INJECTION, SOLUTION INTRAMUSCULAR; INTRAVENOUS at 03:04

## 2024-04-24 RX ADMIN — MORPHINE SULFATE 4 MG: 4 INJECTION INTRAVENOUS at 01:04

## 2024-04-24 RX ADMIN — LIDOCAINE HYDROCHLORIDE 60 MG: 20 INJECTION, SOLUTION EPIDURAL; INFILTRATION; INTRACAUDAL; PERINEURAL at 03:04

## 2024-04-24 RX ADMIN — SUGAMMADEX 200 MG: 100 INJECTION, SOLUTION INTRAVENOUS at 03:04

## 2024-04-24 RX ADMIN — FENTANYL CITRATE 25 MCG: 50 INJECTION INTRAMUSCULAR; INTRAVENOUS at 03:04

## 2024-04-24 RX ADMIN — OXYCODONE HYDROCHLORIDE 5 MG: 5 TABLET ORAL at 04:04

## 2024-04-24 RX ADMIN — PROPOFOL 100 MG: 10 INJECTION, EMULSION INTRAVENOUS at 03:04

## 2024-04-24 RX ADMIN — ROCURONIUM BROMIDE 10 MG: 10 INJECTION, SOLUTION INTRAVENOUS at 03:04

## 2024-04-24 RX ADMIN — ONDANSETRON 4 MG: 2 INJECTION, SOLUTION INTRAMUSCULAR; INTRAVENOUS at 03:04

## 2024-04-24 RX ADMIN — SODIUM CHLORIDE: 0.9 INJECTION, SOLUTION INTRAVENOUS at 03:04

## 2024-04-24 RX ADMIN — FENTANYL CITRATE 50 MCG: 50 INJECTION INTRAMUSCULAR; INTRAVENOUS at 03:04

## 2024-04-24 RX ADMIN — SUCCINYLCHOLINE CHLORIDE 100 MG: 20 INJECTION, SOLUTION INTRAMUSCULAR; INTRAVENOUS at 03:04

## 2024-04-24 NOTE — ED NOTES
Pt complaining of 7/10 L shoulder pain and is requesting more pain medicine. Notified Abelardo BRADSHAW for more meds.

## 2024-04-24 NOTE — ANESTHESIA PREPROCEDURE EVALUATION
04/24/2024  Romy Monae is a 73 y.o., female.      Pre-op Assessment    I have reviewed the Patient Summary Reports.     I have reviewed the Nursing Notes. I have reviewed the NPO Status.   I have reviewed the Medications.     Review of Systems  Anesthesia Hx:  No previous Anesthesia               Denies Personal Hx of Anesthesia complications.                    Social:  Non-Smoker       Hematology/Oncology:                   Hematology Comments: Hx of DVT on AC                    Cardiovascular:     Hypertension   CAD   CABG/stent        hyperlipidemia   ECG has been reviewed. LAD stent placed in 2022, patient denies cardiac issues since that time                          Pulmonary:  Pulmonary Normal                       Renal/:  Renal/ Normal                 Hepatic/GI:     GERD             Musculoskeletal:     Humerus dislocation and fracture after fall             Neurological:  Neurology Normal                                      Endocrine:  Endocrine Normal                Physical Exam  General: Cooperative, Alert and Oriented    Airway:  Mallampati: II   Mouth Opening: < 3 cm  TM Distance: Normal  Tongue: Normal  Neck ROM: Normal ROM  Mouth opening limited by pain, patient with TMJ symptoms   Dental:  Intact        Anesthesia Plan  Type of Anesthesia, risks & benefits discussed:    Anesthesia Type: Gen ETT  Intra-op Monitoring Plan: Standard ASA Monitors  Post Op Pain Control Plan: multimodal analgesia and IV/PO Opioids PRN  Induction:  IV and rapid sequence  Airway Plan: Video, Post-Induction  Informed Consent: Informed consent signed with the Patient and all parties understand the risks and agree with anesthesia plan.  All questions answered.   ASA Score: 3  Day of Surgery Review of History & Physical: H&P Update referred to the surgeon/provider.  Anesthesia Plan Notes: Patient had meal at  "8am including toast, jelly, and a couple of cups of coffee containing creamer. Spoke with ortho team about the risks of aspiration and recommended waiting for the patient to be appropriately NPO before proceeding to OR as the patient has pulse on the extremity and is neurologically intact, no concern for compartment syndrome or risk of loss of limb. Ortho team responded by reclassifying case to "emergent" in order to go to OR prior to appropriate fasting time citing that there is potential for compression of neurovascular structures during waiting period for fasting that would put the patient at an unacceptable risk.     Ready For Surgery From Anesthesia Perspective.     .      "

## 2024-04-24 NOTE — TRANSFER OF CARE
"Anesthesia Transfer of Care Note    Patient: Romy Monae    Procedure(s) Performed: Procedure(s) (LRB):  CLOSED REDUCTION, SHOULDER, possible open (Left)    Patient location: PACU    Anesthesia Type: general    Transport from OR: Transported from OR on 6-10 L/min O2 by face mask with adequate spontaneous ventilation    Post pain: adequate analgesia    Post assessment: no apparent anesthetic complications    Post vital signs: stable    Level of consciousness: awake    Nausea/Vomiting: no nausea/vomiting    Complications: none    Transfer of care protocol was followed      Last vitals: Visit Vitals  BP (!) 179/74 (BP Location: Right arm)   Pulse 87   Temp 36.4 °C (97.6 °F) (Oral)   Resp 18   Ht 5' 5" (1.651 m)   Wt 81.6 kg (180 lb)   LMP  (LMP Unknown)   SpO2 100%   BMI 29.95 kg/m²     "

## 2024-04-24 NOTE — OP NOTE
Facility:  [facility]    Date of Procedure:  4/24/24    Surgeon:  Alfred Nunez MD    First Assistant:  Blake Vizcaino MD    Second Assistant:  Omar Hwang MD    Anesthesia:  GETA    Pre-operative Diagnosis:  Left closed proximal humerus fracture-dislocation    Indication:  Reduce joint    Post-operative Diagnosis:  Left closed proximal humerus fracture-dislocation    Procedure:  Closed reduction left glenohumeral fracture-dislocation under anesthesia and fluoroscopic guidance    The patient was identified in the pre-operative holding area. The correct extremity was marked and written informed consent was verified. The patient was transferred to the OR. The patient was placed on the OR table.A timeout was performed to verify the correct extremity.     Injury films demonstrated an anterior inferior dislocation of the glenohumeral joint with a displaced fracture involving the greater tuberosity and potentially a nondisplaced fracture involving the surgical neck.  In an effort to prevent displacement of the surgical neck, it was recommended that the closed reduction be performed under optimal general anesthesia as well as fluoroscopic guidance.    A traction-counter traction maneuver was performed.  General in line traction was performed.  Gentle manipulation of the humeral head was performed.  Fluoroscopy verified satisfactory reduction of the glenohumeral joint as well as reduction of the greater tuberosity fragment.  Final fluoroscopic views also verified that there was no displacement of the surgical neck.  The patient was placed into an abduction sling.  The patient was awakened and transferred to the postanesthesia care unit in stable condition.    A sterile dressing was placed. The patient was awakened and transferred to the PACU in stable condition.     EBL:  None    Drains:  None    Complications:  None known

## 2024-04-24 NOTE — ED NOTES
Patient taken by transport off the unit. She was given a new gown and ice pack. VSS, NADN, son and  is at the bedside.

## 2024-04-24 NOTE — PHARMACY MED REC
"Admission Medication History     The home medication history was taken by Angelina Erazo CPhT.    Medication history obtained from, Patient's Son Verified     You may go to "Admission" then "Reconcile Home Medications" tabs to review and/or act upon these items.     The home medication list has been updated by the Pharmacy department.   Please read ALL comments highlighted in yellow.   Please address this information as you see fit.    Feel free to contact us if you have any questions or require assistance.      The medications listed below were removed from the home medication list.  Please reorder if appropriate:  Patient reports no longer taking the following medication(s):  Methocarbamol 750 mg  Norco 7.5-325 mg        Angelina Erazo CPhT.  Ext [phone]               .          "

## 2024-04-24 NOTE — PROGRESS NOTES
Vital signs stable, Pt comfortable, Discharge criteria met, Discharge instruction and home meds given to Patient and family

## 2024-04-24 NOTE — CONSULTS
[facility] Consult Note    Consult:  Left proximal humerus fracture dislocation    HPI:  73 y.o. 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  Had immediate pain.  Went to the emergency department where x-rays revealed a left proximal humerus fracture dislocation 2 part with nondisplaced surgical neck.  Denies any prior history of injury to his left shoulder in the past.  No numbness or tingling.  Denies loss consciousness or other musculoskeletal injuries.    PMH:   Past Medical History:   Diagnosis Date    Fibula fracture     6/17       PSH:    has a past surgical history that includes Left heart catheterization (Left, 9/30/2022); Coronary angiography (N/A, 9/30/2022); and Percutaneous transluminal balloon angioplasty of coronary artery (9/30/2022).    SOCIAL:    reports that she has never smoked. She has never used smokeless tobacco. She reports that she does not drink alcohol.    FAMILY:  No family history on file.    MEDS:   No current facility-administered medications on file prior to encounter.     Current Outpatient Medications on File Prior to Encounter   Medication Sig Dispense Refill    apixaban (ELIQUIS) 5 mg Tab Take 1 tablet (5 mg total) by mouth 2 (two) times daily. 180 tablet 3    atorvastatin (LIPITOR) 80 MG tablet Take 1 tablet (80 mg total) by mouth once daily. 90 tablet 3    clopidogreL (PLAVIX) 75 mg tablet Take 1 tablet (75 mg total) by mouth once daily. 30 tablet 11    latanoprost 0.005 % ophthalmic solution latanoprost 0.005 % eye drops   INSTILL 1 DROP IN BOTH EYES EVERY NIGHT AT BEDTIME      metoprolol succinate (TOPROL-XL) 25 MG 24 hr tablet Take 1 tablet (25 mg total) by mouth once daily. 90 tablet 3    multivit-min/iron/folic/lutein (CENTRUM SILVER WOMEN ORAL) Take by mouth once  "daily.         ALLERGY:   Allergies as of 04/24/2024    (No Known Allergies)       LAST MEAL:  toast 7:30 a.m.    Vitals:    Vitals:    04/24/24 1147   BP:    Pulse:    Resp: 20   Temp:        Labs:  No results for input(s): "WBC", "HGB", "HCT", "PLT", "MCV", "RDW", "NA", "K", "CL", "CO2", "BUN", "CREATININE", "GLU", "PROT", "ALBUMIN", "BILITOT", "AST", "ALKPHOS", "ALT" in the last 168 hours.    Coags:   Lab Results   Component Value Date    INR 1.0 09/30/2022    APTT 27.1 09/30/2022       Infection:  No results for input(s): "WBC", "CRP", "ESR" in the last 168 hours.    Physical Exam:    Gen: A/Ox3, NAD  Card: RR by RP  Lungs: nonlabored breathing, symmetric chest rise  Abd: S/NT/ND    Focal examination left lower extremity  Skin has some swelling   Limited range of motion due to fracture dislocation of the proximal humerus  Motor intact pin/ain/u, limited ax due to fracture and pain  [place] itnact ax/m/u/r  Rp2+        Radiology:  Independent review of the x-rays left shoulder show a proximal humerus fracture dislocation subacromial with a displaced greater tuberosity piece a nondisplaced surgical neck.    Assessment/Plan:  73 y.o. female hx hyperlipidemia, hypertension, coronary artery disease with stent placement LAD, DVT, PE, antithrombin 3 deficiency on anticoagulation with Eliquis him clopidogrel who fell onto her left shoulder earlier today after tripping over her spouse's feet sustaining a proximal humerus fracture dislocation.    Had a thorough discussion with the patient about her injury and our treatment recommendations.  We recommend emergent general anesthesia with closed reduction in the OR.  Risks and benefits of the the procedure were discussed in detail with the patient.  She states understanding.  All questions answered.  He would like to proceed with surgery.  Consents obtained for left closed reduction of the shoulder under general anesthesia. This is an emergent procedure.         Blake Vizcaino " MD [facility], PGY-5        Patient seen and examined. She sustained a fracture dislocation of the left shoulder. There is displacement of the greater tuberosity fracture and a suspected non-displaced surgical neck fracture. I have advised attempted closed reduction in the OR under GETA and fluoroscopy. I have reviewed the notes, assessments, and/or procedures performed by Dr. Vizcaino, I concur with her/his documentation of Romy Monae.

## 2024-04-24 NOTE — ED NOTES
Pt presented to the ED via EMS from a fall that happened at home. She was walking and then she ended up tripping over her spouses feet and fell hit her cheek, and shoulder. Clear L shoulder deformity noted. She rates her pain a 8/10 and cannot move her arm around due to the pain. Bed is locked and at the lowest position, call bell is within reach, family is at the bedside. IV is CDI, pain med administered per MD order. No further needs at this time.

## 2024-04-24 NOTE — ED PROVIDER NOTES
Emergency Department Encounter  Provider Note    Romy Monae  [id number]  4/24/2024    Evaluation:    History Acquisition:     Chief Complaint   Patient presents with    Fall     Tripped over spouse's feet and fell forward. Presents awake, alert with c/o left shoulder pain - sling/swath in place per EMS. EMS reports deformity. + distal pulses.        History of Present Illness:  Romy Monae is a 73 y.o. female who has a past medical history of Fibula fracture.    The patient presents to the ED due to L arm pain.   Patient reports symptoms started after trip and fall this morning. She tried to catch herself with her L arm and landed on it. She endorses severe pain. She denies any prior L arm injury. She denies any head impact, LOC, neck/back pain, or additional LE injury.     Additional historians utilized:  EMS report - vitals stable en route. Received 100 ug fentanyl.   Family at bedside, states patient has had R shoulder injuries and dislocations before but not the L.     Prior medical records were reviewed:   Cards visit 3/20 for f/u HTN, HLD, CAD s/p LAD stent  [facility]-On[facility] visit 06/2023 for f/u DVT/PE in 2018, 2011. Currently on Eliquis.   Cards visit 05/2023 for f/u CAD    The patient's list of active medical history, family/social history, medications, and allergies as documented has been reviewed.     Past Medical History:   Diagnosis Date    Fibula fracture     6/17     Past Surgical History:   Procedure Laterality Date    CLOSED REDUCTION OF INJURY OF SHOULDER Left 4/24/2024    Procedure: CLOSED REDUCTION, SHOULDER, possible open;  Surgeon: Alfred Nunez MD;  Location: [facility] OR;  Service: Orthopedics;  Laterality: Left;    CORONARY ANGIOGRAPHY N/A 9/30/2022    Procedure: ANGIOGRAM, CORONARY ARTERY;  Surgeon: Bernardo Bernard MD;  Location: [facility] CATH LAB/EP;  Service: Cardiology;  Laterality: N/A;    LEFT HEART CATHETERIZATION Left 9/30/2022    Procedure: Left heart cath;  Surgeon: Bernardo Bernard MD;   Location: [facility] CATH LAB/EP;  Service: Cardiology;  Laterality: Left;    PERCUTANEOUS TRANSLUMINAL BALLOON ANGIOPLASTY OF CORONARY ARTERY  9/30/2022    Procedure: Angioplasty-coronary;  Surgeon: Bernardo Bernard MD;  Location: [facility] CATH LAB/EP;  Service: Cardiology;;     No family history on file.  Social History     Socioeconomic History    Marital status:    Tobacco Use    Smoking status: Never    Smokeless tobacco: Never   Substance and Sexual Activity    Alcohol use: No     Social Determinants of Health     Financial Resource Strain: Low Risk  (3/13/2024)    Overall Financial Resource Strain (CARDIA)     Difficulty of Paying Living Expenses: Not very hard   Food Insecurity: No Food Insecurity (3/13/2024)    Hunger Vital Sign     Worried About Running Out of Food in the Last Year: Never true     Ran Out of Food in the Last Year: Never true   Transportation Needs: No Transportation Needs (3/13/2024)    PRAPARE - Transportation     Lack of Transportation (Medical): No     Lack of Transportation (Non-Medical): No   Physical Activity: Sufficiently Active (3/13/2024)    Exercise Vital Sign     Days of Exercise per Week: 3 days     Minutes of Exercise per Session: 60 min   Stress: No Stress Concern Present (3/13/2024)    [employer] - Occupational Stress Questionnaire     Feeling of Stress : Not at all   Social Connections: Unknown (3/13/2024)    Social Connection and Isolation Panel [NHANES]     Frequency of Communication with Friends and Family: Once a week     Frequency of Social Gatherings with Friends and Family: Once a week     Active Member of Clubs or Organizations: Yes     Attends Club or Organization Meetings: More than 4 times per year     Marital Status:    Housing Stability: Unknown (3/13/2024)    Housing Stability Vital Sign     Unstable Housing in the Last Year: No       Medications:  Discharge Medication List as of 4/24/2024  4:46 PM        START taking these  medications    Details   HYDROcodone-acetaminophen (NORCO) 5-325 mg per tablet Take 1 tablet by mouth every 6 (six) hours as needed for Pain., Starting Wed 4/24/2024, Normal      ondansetron (ZOFRAN-ODT) 4 MG TbDL Take 1 tablet (4 mg total) by mouth every 8 (eight) hours as needed., Starting Wed 4/24/2024, Normal           CONTINUE these medications which have NOT CHANGED    Details   apixaban (ELIQUIS) 5 mg Tab Take 1 tablet (5 mg total) by mouth 2 (two) times daily., Starting Mon 10/23/2023, Normal      atorvastatin (LIPITOR) 80 MG tablet Take 1 tablet (80 mg total) by mouth once daily., Starting Mon 9/11/2023, Until Tue 9/10/2024, Normal      clopidogreL (PLAVIX) 75 mg tablet Take 1 tablet (75 mg total) by mouth once daily., Starting Mon 9/11/2023, Until Tue 9/10/2024, Normal      latanoprost 0.005 % ophthalmic solution Place 1 drop into both eyes every evening., Historical Med      metoprolol succinate (TOPROL-XL) 25 MG 24 hr tablet Take 1 tablet (25 mg total) by mouth once daily., Starting Wed 3/20/2024, Until Thu 3/20/2025, Normal      multivit-min/iron/folic/lutein (CENTRUM SILVER WOMEN ORAL) Take 1 tablet by mouth once daily., Historical Med             Allergies:  Review of patient's allergies indicates:  No Known Allergies    Review of Systems   Musculoskeletal:  Positive for arthralgias and myalgias.         Physical Exam:     Initial Vitals [04/24/24 1131]   BP Pulse Resp Temp SpO2   (!) 176/57 87 18 97.5 °F (36.4 °C) 100 %      MAP       --         Physical Exam    Nursing note and vitals reviewed.  Constitutional: She appears well-developed and well-nourished. She is not diaphoretic. No distress.   HENT:   Head: Normocephalic and atraumatic.   Mouth/Throat: Oropharynx is clear and moist.   Eyes: EOM are normal. Pupils are equal, round, and reactive to light.   Neck: No tracheal deviation present.   Cardiovascular:  Normal rate, regular rhythm, normal heart sounds and intact distal pulses.            Pulmonary/Chest: Breath sounds normal. No stridor. No respiratory distress. She has no wheezes.   Abdominal: Abdomen is soft. Bowel sounds are normal. She exhibits no distension and no mass. There is no abdominal tenderness.   Musculoskeletal:         General: No edema. Normal range of motion.      Comments: L shoulder deformity.   LUE in sling, limited ROM due to pain.  Distal radial pulse intact.      Neurological: She is alert and oriented to person, place, and time. She has normal strength. No cranial nerve deficit or sensory deficit.   Skin: Skin is warm and dry. Capillary refill takes less than 2 seconds. No pallor.   Psychiatric: She has a normal mood and affect. Her behavior is normal. Thought content normal.         Differential Diagnoses:   Based on available information and initial assessment, Differential Diagnosis includes, but is not limited to:  Shoulder dislocation, fracture, AC separation, compartment syndrome, nerve injury/palsy, impingement syndrome, thoracic outlet syndrome, vascular injury, DVT, rhabdomyolysis, hemarthrosis, septic joint, capsulitis, bursitis, rotator cuff injury, tendonitis, muscle strain, ligament tear/sprain, laceration with foreign body, abrasion, soft tissue contusion, osteoarthritis.        ED Management:   Procedures    Orders Placed This Encounter    X-Ray Shoulder Trauma Left    SURG FL Surgery Fluoro Usage    CBC Without Differential    Comprehensive metabolic panel    Diet Adult Regular    Notify your health care provider if you experience any of the following:  difficulty breathing or increased cough    Notify your health care provider if you experience any of the following:  redness, tenderness, or signs of infection (pain, swelling, redness, odor or green/yellow discharge around incision site)    Notify your health care provider if you experience any of the following:  severe uncontrolled pain    Notify your health care provider if you experience any of the  following:  persistent nausea and vomiting or diarrhea    Notify your health care provider if you experience any of the following:  temperature >100.4    Leave dressing on - Keep it clean, dry, and intact until clinic visit    EKG 12-lead    DISCHARGE PATIENT    morphine injection 4 mg    morphine injection 4 mg    HYDROcodone-acetaminophen (NORCO) 5-325 mg per tablet    ondansetron (ZOFRAN-ODT) 4 MG TbDL    Case Request Operating Room: CLOSED REDUCTION, SHOULDER, possible open    Weight bearing restrictions (specify):          EKG:       Labs:     Labs Reviewed   COMPREHENSIVE METABOLIC PANEL - Abnormal; Notable for the following components:       Result Value    CO2 18 (*)     Glucose 156 (*)     All other components within normal limits   CBC WITHOUT DIFFERENTIAL     Independent review of the labs ordered include:       Imaging:     Imaging Results              SURG FL Surgery Fluoro Usage (Final result)  Result time 04/24/24 15:24:54      Final result by Access, Silent  (04/24/24 15:24:54)                   Narrative:    See OP Notes for results.     IMPRESSION: See OP Notes for results.             This procedure was auto-finalized by: Virtual Radiologist                                     X-Ray Shoulder Trauma Left (Final result)  Result time 04/24/24 13:01:39      Final result by Mushtaq Prakash MD (04/24/24 13:01:39)                   Impression:      As above.      Electronically signed by: Mushtaq Prakash  Date:    04/24/2024  Time:    13:01               Narrative:    EXAMINATION:  XR SHOULDER TRAUMA 3 VIEW LEFT    CLINICAL HISTORY:  Pain in left arm    TECHNIQUE:  Three views of the left shoulder were performed.    COMPARISON  None    FINDINGS:  Anteromedial dislocation of the humeral head relative to the glenoid with fracture component.  AC joint is intact with DJD.                                         Medications Given:     Medications   morphine injection 4 mg (4 mg Intravenous  Given 4/24/24 1147)   morphine injection 4 mg (4 mg Intravenous Given 4/24/24 1300)        Medical Decision Making:    Additional Consideration:   Additional testing considered during clinical course: CT head considered, but no headache, evidence of trauma, or other findings to warrant neuroimaging.     Social determinants of health considered during development of treatment plan include: poor access to care    Current co-morbidities considered which impacted clinical decision making: HTN, HLD, CAD s/p LAD stent    Case discussed with additional provider: [facility], Dr. Vizcaino, who evaluated and plan to take to OR for attempt at closed reduction under general anesthesia     ED Course as of 04/25/24 0955 Wed Apr 24, 2024   1340 SpO2: 100 % [SS]   1340 Resp: 18 [SS]   1340 Pulse: 87 [SS]   1340 Temp Source: Oral [SS]   1340 Temp: 97.5 °F (36.4 °C) [SS]   1340 BP(!): 176/57  Vitals reassuring  [SS]   1340 Comprehensive metabolic panel(!)  Glucose mildly elevated  [SS]   1340 CBC Without Differential  WNL [SS]   1340 X-Ray Shoulder Trauma Left  Shoulder x-ray independently interpreted: humeral head fracture with inferior dislocation.  Agree with radiologist interpretation.    [SS]   1341 [facility] consulted, will plan for reduction in OR, will continue NPO [SS]      ED Course User Index  [SS] Rony Zhou MD            Medical Decision Making  72 yo F with L shoulder pain after fall.  X-ray reveals L shoulder dislocation with associated humeral head fracture.  Ortho consulted, who will take to OR for attempt at closed reduction under anesthesia with possible open reduction if nexessary.  Labs reassuring. Vitals stable throughout ED course.     Problems Addressed:  Closed inferior dislocation of left shoulder, initial encounter: acute illness or injury  Left arm pain: acute illness or injury  Pre-op evaluation: acute illness or injury    Amount and/or Complexity of Data Reviewed  Independent Historian: guardian and  EMS  External Data Reviewed: notes.  Labs: ordered. Decision-making details documented in ED Course.  Radiology: ordered and independent interpretation performed. Decision-making details documented in ED Course.    Risk  Prescription drug management.  Decision regarding hospitalization.  Diagnosis or treatment significantly limited by social determinants of health.  Emergency major surgery.  Minor surgery with identified risk factors.    Critical Care  Total time providing critical care: 45 minutes        Clinical Impression:       ICD-10-CM ICD-9-CM   1. Closed fracture dislocation of joint of left shoulder girdle, initial encounter  S42.92XA 812.00   2. Left arm pain  M79.602 729.5   3. Pre-op evaluation  Z01.818 V72.84   4. Closed inferior dislocation of left shoulder, initial encounter  S43.035A 831.03   5. Traumatic closed displaced fracture of left shoulder with anterior dislocation, initial encounter  S42.92XA 812.00         Follow-up Information    None          ED Disposition Condition    Observation Stable            Disposition:  To OR with Ortho          Rony Zhou MD  04/25/24 0973

## 2024-04-24 NOTE — ANESTHESIA POSTPROCEDURE EVALUATION
Anesthesia Post Evaluation    Patient: Romy Monae    Procedure(s) Performed: Procedure(s) (LRB):  CLOSED REDUCTION, SHOULDER, possible open (Left)    Final Anesthesia Type: general      Patient location during evaluation: PACU  Patient participation: Yes- Able to Participate  Level of consciousness: awake and alert  Post-procedure vital signs: reviewed and stable  Pain management: adequate  Airway patency: patent    PONV status at discharge: No PONV  Anesthetic complications: no      Cardiovascular status: stable  Respiratory status: room air  Hydration status: euvolemic  Follow-up not needed.              Vitals Value Taken Time   /64 04/24/24 1605   Temp 36.5 °C (97.7 °F) 04/24/24 1555   Pulse 68 04/24/24 1606   Resp 36 04/24/24 1606   SpO2 92 % 04/24/24 1606   Vitals shown include unfiled device data.      No case tracking events are documented in the log.      Pain/[place] Score: Pain Rating Prior to Med Admin: 7 (4/24/2024  1:00 PM)  Pain Rating Post Med Admin: 4 (doesnt bother her until she has to move) (4/24/2024  1:30 PM)  [place] Score: 10 (4/24/2024  3:58 PM)

## 2024-04-24 NOTE — ED NOTES
Administered IV morphine as per MD order for 7/10, sent bloodwork up to the lab. No further needs at this time.

## 2024-04-25 NOTE — DISCHARGE SUMMARY
[facility] ([facility])  Discharge Note  Short Stay    Procedure(s) (LRB):  Closed reduction left shoulder under general anesthesia      OUTCOME: Patient tolerated treatment/procedure well without complication and is now ready for discharge.    DISPOSITION: Home or Self Care    FINAL DIAGNOSIS:  Left proximal humerus fracture dislocation    FOLLOWUP: In clinic    DISCHARGE INSTRUCTIONS:    Discharge Procedure Orders   Diet Adult Regular     Notify your health care provider if you experience any of the following:  difficulty breathing or increased cough     Notify your health care provider if you experience any of the following:  redness, tenderness, or signs of infection (pain, swelling, redness, odor or green/yellow discharge around incision site)     Notify your health care provider if you experience any of the following:  severe uncontrolled pain     Notify your health care provider if you experience any of the following:  persistent nausea and vomiting or diarrhea     Notify your health care provider if you experience any of the following:  temperature >100.4     Leave dressing on - Keep it clean, dry, and intact until clinic visit     Weight bearing restrictions (specify):   Order Comments: Maintain sling at all times, NWB LUE         Clinical Reference Documents Added to Patient Instructions         Document    FROZEN SHOULDER, CLOSED MANIPULATION (ENGLISH)            TIME SPENT ON DISCHARGE: 30 minutes    Blake Vizcaino MD  [facility]       I have reviewed the notes, assessments, and/or procedures performed by Dr. Vizcaino, I concur with her/his documentation of Romy Monae.

## 2024-05-07 NOTE — PROGRESS NOTES
Patient ID:   Romy Monae is a 73 y.o. female.    Chief Complaint:   Follow-up evaluation s/p closed reduction L proximal humerus fracture dislocation    HPI:   The patient is returning today for evaluation of her left shoulder.  She underwent closed reduction of a left proximal humerus fracture dislocation.  She was noticed to have a nondisplaced fracture of the surgical neck as well as a displaced fracture of the greater tuberosity.  The closed reduction was done under general anesthesia in the operating room with fluoroscopy.  A gentle closed reduction was successful.  After the closed reduction, the surgical neck remained nondisplaced and the tuberosity was well-positioned.  She is returning today for her 1st follow-up since the closed reduction.  She is currently in a abduction sling.  She reports pain in her left shoulder.    Medications:    Current Outpatient Medications:     apixaban (ELIQUIS) 5 mg Tab, Take 1 tablet (5 mg total) by mouth 2 (two) times daily., Disp: 180 tablet, Rfl: 3    atorvastatin (LIPITOR) 80 MG tablet, Take 1 tablet (80 mg total) by mouth once daily., Disp: 90 tablet, Rfl: 3    clopidogreL (PLAVIX) 75 mg tablet, Take 1 tablet (75 mg total) by mouth once daily., Disp: 30 tablet, Rfl: 11    HYDROcodone-acetaminophen (NORCO) 5-325 mg per tablet, Take 1 tablet by mouth every 6 (six) hours as needed for Pain., Disp: 28 tablet, Rfl: 0    latanoprost 0.005 % ophthalmic solution, Place 1 drop into both eyes every evening., Disp: , Rfl:     metoprolol succinate (TOPROL-XL) 25 MG 24 hr tablet, Take 1 tablet (25 mg total) by mouth once daily., Disp: 90 tablet, Rfl: 3    multivit-min/iron/folic/lutein (CENTRUM SILVER WOMEN ORAL), Take 1 tablet by mouth once daily., Disp: , Rfl:     ondansetron (ZOFRAN-ODT) 4 MG TbDL, Take 1 tablet (4 mg total) by mouth every 8 (eight) hours as needed., Disp: 30 tablet, Rfl: 0    Allergies:  Review of patient's allergies indicates:  No Known  Allergies    Vitals:  LMP  (LMP Unknown)     Physical Examination:  Ortho Exam   Left shoulder exam  There is swelling and ecchymoses in the left upper extremity.  Light touch is intact in the distributions of the axillary, musculocutaneous, radial, ulnar, and median distributions.  She does have tingling in the tip of her right thumb and her index finger.  She is able to fire her deltoid biceps and triceps.  She has intact wrist extensors, finger extensors, wrist flexors, and finger flexors.  She does have some stiffness when she attempts to make a  due to swelling in her fingers.      Imaging Studies:  I have ordered and independently reviewed the following imaging studies performed at [facility] today    X-Ray Shoulder Trauma 3 view Left  Narrative: EXAMINATION:  XR SHOULDER TRAUMA 3 VIEW LEFT    CLINICAL HISTORY:  . Pain in left shoulder    COMPARISON:  04/24/2024    TECHNIQUE:  Multiple views of the left shoulder.    FINDINGS:  There is redemonstration of a displaced transverse fracture through the neck of the humerus.  There is posterior rotation and impaction of the humeral head which remains roughly congruent with the bony glenoid.  Fracture lines extend into the greater tuberosity.  The humeral shaft is displaced approximately 1 shaft with anteriorly.    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    .  Impression: Redemonstration of displaced proximal humerus fracture as above    Electronically signed by: Sameer Harrell Jr  Date:    05/08/2024  Time:    12:37    Assessment:  1. Closed fracture dislocation of joint of left shoulder girdle with routine healing, subsequent encounter      Plan:  I reviewed the radiographic findings from today.  Although the humeral head has remained reduced in the glenoid, the surgical neck fracture is now essentially 100% displaced.  I recommended surgical intervention to include open reduction internal fixation of the left  proximal humerus.  She will need to stop taking Plavix and Eliquis.  I have recommended that she stopped taking the Plavix now and to stop taking the Eliquis 72 hours before surgery.  She will also go down for an EKG today.  Recent laboratory studies were found to be unremarkable.  We will plan to proceed with the surgery on Monday, May 13, 2024.        No follow-ups on file.

## 2024-05-08 ENCOUNTER — HOSPITAL ENCOUNTER (OUTPATIENT)
Dept: RADIOLOGY | Facility: HOSPITAL | Age: 74
Discharge: HOME | End: 2024-05-08
Attending: ORTHOPAEDIC SURGERY
Payer: MEDICARE

## 2024-05-08 ENCOUNTER — CLINICAL SUPPORT (OUTPATIENT)
Dept: LAB | Facility: HOSPITAL | Age: 74
End: 2024-05-08
Attending: ORTHOPAEDIC SURGERY
Payer: MEDICARE

## 2024-05-08 ENCOUNTER — OFFICE VISIT (OUTPATIENT)
Dept: ORTHOPEDICS | Facility: CLINIC | Age: 74
End: 2024-05-08
Payer: MEDICARE

## 2024-05-08 VITALS — BODY MASS INDEX: 30.34 KG/M2 | HEIGHT: 65 IN | WEIGHT: 182.13 LBS

## 2024-05-08 DIAGNOSIS — M25.512 LEFT SHOULDER PAIN, UNSPECIFIED CHRONICITY: ICD-10-CM

## 2024-05-08 DIAGNOSIS — Z01.818 PRE-OP EXAM: ICD-10-CM

## 2024-05-08 DIAGNOSIS — S42.92XD CLOSED FRACTURE DISLOCATION OF JOINT OF LEFT SHOULDER GIRDLE WITH ROUTINE HEALING, SUBSEQUENT ENCOUNTER: Primary | ICD-10-CM

## 2024-05-08 PROCEDURE — 99024 POSTOP FOLLOW-UP VISIT: CPT | Mod: S$GLB,,, | Performed by: ORTHOPAEDIC SURGERY

## 2024-05-08 PROCEDURE — 1159F MED LIST DOCD IN RCRD: CPT | Mod: CPTII,S$GLB,, | Performed by: ORTHOPAEDIC SURGERY

## 2024-05-08 PROCEDURE — 73030 X-RAY EXAM OF SHOULDER: CPT | Mod: 26,LT,, | Performed by: RADIOLOGY

## 2024-05-08 PROCEDURE — 1125F AMNT PAIN NOTED PAIN PRSNT: CPT | Mod: CPTII,S$GLB,, | Performed by: ORTHOPAEDIC SURGERY

## 2024-05-08 PROCEDURE — 3288F FALL RISK ASSESSMENT DOCD: CPT | Mod: CPTII,S$GLB,, | Performed by: ORTHOPAEDIC SURGERY

## 2024-05-08 PROCEDURE — 99999 PR PBB SHADOW E&M-EST. PATIENT-LVL IV: CPT | Mod: PBBFAC,,, | Performed by: ORTHOPAEDIC SURGERY

## 2024-05-08 PROCEDURE — 93005 ELECTROCARDIOGRAM TRACING: CPT

## 2024-05-08 PROCEDURE — 1160F RVW MEDS BY RX/DR IN RCRD: CPT | Mod: CPTII,S$GLB,, | Performed by: ORTHOPAEDIC SURGERY

## 2024-05-08 PROCEDURE — 73030 X-RAY EXAM OF SHOULDER: CPT | Mod: TC,PN,LT

## 2024-05-08 PROCEDURE — 1101F PT FALLS ASSESS-DOCD LE1/YR: CPT | Mod: CPTII,S$GLB,, | Performed by: ORTHOPAEDIC SURGERY

## 2024-05-08 PROCEDURE — 93010 ELECTROCARDIOGRAM REPORT: CPT | Mod: ,,, | Performed by: INTERNAL MEDICINE

## 2024-05-08 RX ORDER — SODIUM CHLORIDE 9 MG/ML
INJECTION, SOLUTION INTRAVENOUS CONTINUOUS
Status: CANCELLED | OUTPATIENT
Start: 2024-05-08

## 2024-05-08 RX ORDER — CEFAZOLIN SODIUM 2 G/50ML
2 SOLUTION INTRAVENOUS
Status: CANCELLED | OUTPATIENT
Start: 2024-05-08

## 2024-05-08 NOTE — PATIENT INSTRUCTIONS
Alfred Nunez MD, [facility]  Orthopedic Surgery & Sports Medicine  , Residency   [facility] Department of Orthopedic Surgery  Orthopedic Surgeon, [employer] [place] [employer]  Head Team Physician, [facility] [employer]  [employer]    General Instructions:    Dr. Nunez will provide detailed instructions that may be specific to your surgery. Please know that the following information serves only as a general guideline    Before your surgery, you will receive a phone call from the surgical pre-operative staff regarding preoperative instructions. Listen very carefully and take notes so that you do not forget anything. Basic instructions generally include:    Do not eat or drink anything after midnight the night before surgery (including drinking water, chewing gum, eating hard candy or chewing tobacco). Brushing your teeth is permitted but do not swallow any water. Eating and drinking can cause serious complications and/or cause your surgery to be delayed or cancelled. We ask that you refrain from smoking after midnight the night before your surgery.    Do not drink alcoholic beverages 24 hours before surgery.    If diabetic, do not take your morning medications (either insulin or medication by mouth) the morning of the surgery. Please bring your medication(s) with you to the surgery center.    If you take medications for your blood pressure or heart, please take your medication(s) as directed by the anesthesiologist with just a sip of water.    Please inform the anesthesiologist if you are taking aspirin products or herbal remedies. These products could prolong bleeding time therefore might need to be discontinued prior to your surgery.    Take a shower or bath, but do not apply lotions or powder. This will minimize the chance of infection. Do not wear make up, jewelry, or contact lenses. Remove all nail polish.    Leave all valuables at home. You will be required to remove all  of your clothing before the surgery and wear a patient gown. Wear comfortable clothing and button down shirt (when applicable for shoulder or elbow surgery).    Notify Dr. Nunez if there is any change in your physical condition such as cold, fever, sore throat, rash, nausea, vomiting, or exposure to chicken pox.    A responsible adult must be available to receive instructions about your care and to drive you home from the surgery center after your surgery. If a responsible adult is not available, your surgery will be cancelled. It is strongly recommended that a responsible adult stay with you for 24 hours after surgery.    Understand that if complications arise, a hospital transfer and admission may be necessary following surgery.    Bring any equipment (sling, crutches, walker) with you that may be necessary after surgery.    Pre-Operative Cleansing:  It is recommended that you wash the shoulder with benzoyl peroxide soap daily starting 5 days before surgery. Studies show that this regimen decreases the amount of certain bacteria that naturally reside on the skin and may lessen your risk of infection.     It is recommended that you wash the surgical site with Hibiclens (chlorhexidine gluconate solution) the night before and the morning of surgery. Studies show that this regimen decreases the amount of certain bacteria that naturally reside on the skin and may lessen your risk of infection.

## 2024-05-12 LAB
OHS QRS DURATION: 90 MS
OHS QTC CALCULATION: 421 MS

## 2024-05-13 ENCOUNTER — ANESTHESIA (OUTPATIENT)
Dept: SURGERY | Facility: HOSPITAL | Age: 74
End: 2024-05-13
Payer: MEDICARE

## 2024-05-13 ENCOUNTER — HOSPITAL ENCOUNTER (OUTPATIENT)
Facility: HOSPITAL | Age: 74
Discharge: HOME | End: 2024-05-14
Attending: ORTHOPAEDIC SURGERY | Admitting: ORTHOPAEDIC SURGERY
Payer: MEDICARE

## 2024-05-13 ENCOUNTER — ANESTHESIA EVENT (OUTPATIENT)
Dept: SURGERY | Facility: HOSPITAL | Age: 74
End: 2024-05-13
Payer: MEDICARE

## 2024-05-13 DIAGNOSIS — S42.92XA CLOSED FRACTURE DISLOCATION OF JOINT OF LEFT SHOULDER GIRDLE, INITIAL ENCOUNTER: Primary | ICD-10-CM

## 2024-05-13 DIAGNOSIS — S42.92XD CLOSED FRACTURE DISLOCATION OF JOINT OF LEFT SHOULDER GIRDLE WITH ROUTINE HEALING, SUBSEQUENT ENCOUNTER: ICD-10-CM

## 2024-05-13 DIAGNOSIS — M75.102 NONTRAUMATIC TEAR OF LEFT ROTATOR CUFF, UNSPECIFIED TEAR EXTENT: ICD-10-CM

## 2024-05-13 PROCEDURE — 63600175 PHARM REV CODE 636 W HCPCS: Performed by: ORTHOPAEDIC SURGERY

## 2024-05-13 PROCEDURE — 71000016 HC POSTOP RECOV ADDL HR: Performed by: ORTHOPAEDIC SURGERY

## 2024-05-13 PROCEDURE — D9220A PRA ANESTHESIA: Mod: ANES,,, | Performed by: ANESTHESIOLOGY

## 2024-05-13 PROCEDURE — C1713 ANCHOR/SCREW BN/BN,TIS/BN: HCPCS | Performed by: ORTHOPAEDIC SURGERY

## 2024-05-13 PROCEDURE — D9220A PRA ANESTHESIA: Mod: CRNA,,, | Performed by: NURSE ANESTHETIST, CERTIFIED REGISTERED

## 2024-05-13 PROCEDURE — 25000003 PHARM REV CODE 250: Performed by: NURSE ANESTHETIST, CERTIFIED REGISTERED

## 2024-05-13 PROCEDURE — 37000008 HC ANESTHESIA 1ST 15 MINUTES: Performed by: ORTHOPAEDIC SURGERY

## 2024-05-13 PROCEDURE — 64415 NJX AA&/STRD BRCH PLXS IMG: CPT | Mod: 59,LT | Performed by: ANESTHESIOLOGY

## 2024-05-13 PROCEDURE — 23412 REPAIR ROTATOR CUFF CHRONIC: CPT | Mod: 58,51,LT, | Performed by: ORTHOPAEDIC SURGERY

## 2024-05-13 PROCEDURE — 23430 REPAIR BICEPS TENDON: CPT | Mod: 58,51,LT, | Performed by: ORTHOPAEDIC SURGERY

## 2024-05-13 PROCEDURE — 25000003 PHARM REV CODE 250: Performed by: STUDENT IN AN ORGANIZED HEALTH CARE EDUCATION/TRAINING PROGRAM

## 2024-05-13 PROCEDURE — 63600175 PHARM REV CODE 636 W HCPCS: Performed by: STUDENT IN AN ORGANIZED HEALTH CARE EDUCATION/TRAINING PROGRAM

## 2024-05-13 PROCEDURE — 23615 OPTX PROX HUMRL FX W/INT FIX: CPT | Mod: 58,LT,, | Performed by: ORTHOPAEDIC SURGERY

## 2024-05-13 PROCEDURE — 71000033 HC RECOVERY, INTIAL HOUR: Performed by: ORTHOPAEDIC SURGERY

## 2024-05-13 PROCEDURE — 63600175 PHARM REV CODE 636 W HCPCS: Performed by: ANESTHESIOLOGY

## 2024-05-13 PROCEDURE — 36000710: Performed by: ORTHOPAEDIC SURGERY

## 2024-05-13 PROCEDURE — 94799 UNLISTED PULMONARY SVC/PX: CPT

## 2024-05-13 PROCEDURE — 63600175 PHARM REV CODE 636 W HCPCS: Performed by: NURSE ANESTHETIST, CERTIFIED REGISTERED

## 2024-05-13 PROCEDURE — 27201423 OPTIME MED/SURG SUP & DEVICES STERILE SUPPLY: Performed by: ORTHOPAEDIC SURGERY

## 2024-05-13 PROCEDURE — 71000015 HC POSTOP RECOV 1ST HR: Performed by: ORTHOPAEDIC SURGERY

## 2024-05-13 PROCEDURE — C1769 GUIDE WIRE: HCPCS | Performed by: ORTHOPAEDIC SURGERY

## 2024-05-13 PROCEDURE — 36000711: Performed by: ORTHOPAEDIC SURGERY

## 2024-05-13 PROCEDURE — 37000009 HC ANESTHESIA EA ADD 15 MINS: Performed by: ORTHOPAEDIC SURGERY

## 2024-05-13 DEVICE — SCREW CORTEX 3.5 X 26: Type: IMPLANTABLE DEVICE | Site: ARM | Status: FUNCTIONAL

## 2024-05-13 DEVICE — PLATE PROXIMAL HUMERAL 3 HOLE: Type: IMPLANTABLE DEVICE | Site: ARM | Status: FUNCTIONAL

## 2024-05-13 DEVICE — SCREW LOCKING 3.5MM X 34MM: Type: IMPLANTABLE DEVICE | Site: ARM | Status: FUNCTIONAL

## 2024-05-13 DEVICE — SCREW LOCKING 3.5MM X 40MM: Type: IMPLANTABLE DEVICE | Site: ARM | Status: FUNCTIONAL

## 2024-05-13 DEVICE — K-WIRE TRCR PT1.25MM D 150MM L: Type: IMPLANTABLE DEVICE | Site: ARM | Status: FUNCTIONAL

## 2024-05-13 DEVICE — SCREW CORTEX 3.5 X 24: Type: IMPLANTABLE DEVICE | Site: ARM | Status: FUNCTIONAL

## 2024-05-13 DEVICE — SCREW LOCKING 3.5MM/32MM RECES: Type: IMPLANTABLE DEVICE | Site: ARM | Status: FUNCTIONAL

## 2024-05-13 DEVICE — SCREW CORTEX 3.5 X 40: Type: IMPLANTABLE DEVICE | Site: ARM | Status: FUNCTIONAL

## 2024-05-13 DEVICE — SCREW LOCKING 3.5MM X 38MM: Type: IMPLANTABLE DEVICE | Site: ARM | Status: FUNCTIONAL

## 2024-05-13 DEVICE — SCREW LOCK 3.5MM X 26MM: Type: IMPLANTABLE DEVICE | Site: ARM | Status: FUNCTIONAL

## 2024-05-13 DEVICE — SCREW CORTEX 3.5MM X 28MM: Type: IMPLANTABLE DEVICE | Site: ARM | Status: FUNCTIONAL

## 2024-05-13 RX ORDER — PROPOFOL 10 MG/ML
VIAL (ML) INTRAVENOUS
Status: DISCONTINUED | OUTPATIENT
Start: 2024-05-13 | End: 2024-05-13

## 2024-05-13 RX ORDER — ONDANSETRON 4 MG/1
4 TABLET, ORALLY DISINTEGRATING ORAL ONCE
Qty: 1 TABLET | Refills: 0 | Status: SHIPPED | OUTPATIENT
Start: 2024-05-13 | End: 2024-05-14

## 2024-05-13 RX ORDER — DIPHENHYDRAMINE HCL 25 MG
25 CAPSULE ORAL EVERY 6 HOURS PRN
Status: DISCONTINUED | OUTPATIENT
Start: 2024-05-13 | End: 2024-05-14 | Stop reason: HOSPADM

## 2024-05-13 RX ORDER — FAMOTIDINE 20 MG/1
20 TABLET, FILM COATED ORAL 2 TIMES DAILY
Status: DISCONTINUED | OUTPATIENT
Start: 2024-05-13 | End: 2024-05-14 | Stop reason: HOSPADM

## 2024-05-13 RX ORDER — AMOXICILLIN 250 MG
1 CAPSULE ORAL 2 TIMES DAILY
Status: DISCONTINUED | OUTPATIENT
Start: 2024-05-13 | End: 2024-05-14 | Stop reason: HOSPADM

## 2024-05-13 RX ORDER — PHENYLEPHRINE HYDROCHLORIDE 10 MG/ML
INJECTION INTRAVENOUS
Status: DISCONTINUED | OUTPATIENT
Start: 2024-05-13 | End: 2024-05-13

## 2024-05-13 RX ORDER — ONDANSETRON HYDROCHLORIDE 2 MG/ML
INJECTION, SOLUTION INTRAVENOUS
Status: DISCONTINUED | OUTPATIENT
Start: 2024-05-13 | End: 2024-05-13

## 2024-05-13 RX ORDER — HYDROCODONE BITARTRATE AND ACETAMINOPHEN 5; 325 MG/1; MG/1
1 TABLET ORAL EVERY 4 HOURS PRN
Status: DISCONTINUED | OUTPATIENT
Start: 2024-05-13 | End: 2024-05-14 | Stop reason: HOSPADM

## 2024-05-13 RX ORDER — CEPHALEXIN 500 MG/1
500 CAPSULE ORAL 4 TIMES DAILY
Qty: 4 CAPSULE | Refills: 0 | Status: SHIPPED | OUTPATIENT
Start: 2024-05-13 | End: 2024-05-21

## 2024-05-13 RX ORDER — HYDROMORPHONE HYDROCHLORIDE 2 MG/ML
INJECTION, SOLUTION INTRAMUSCULAR; INTRAVENOUS; SUBCUTANEOUS
Status: DISCONTINUED | OUTPATIENT
Start: 2024-05-13 | End: 2024-05-13

## 2024-05-13 RX ORDER — ONDANSETRON 4 MG/1
4 TABLET, ORALLY DISINTEGRATING ORAL EVERY 8 HOURS PRN
Status: DISCONTINUED | OUTPATIENT
Start: 2024-05-13 | End: 2024-05-14 | Stop reason: HOSPADM

## 2024-05-13 RX ORDER — ACETAMINOPHEN 10 MG/ML
INJECTION, SOLUTION INTRAVENOUS
Status: DISCONTINUED | OUTPATIENT
Start: 2024-05-13 | End: 2024-05-13

## 2024-05-13 RX ORDER — SODIUM CHLORIDE 9 MG/ML
INJECTION, SOLUTION INTRAVENOUS CONTINUOUS
Status: DISCONTINUED | OUTPATIENT
Start: 2024-05-13 | End: 2024-05-13

## 2024-05-13 RX ORDER — PREGABALIN 75 MG/1
75 CAPSULE ORAL 2 TIMES DAILY
Status: DISCONTINUED | OUTPATIENT
Start: 2024-05-13 | End: 2024-05-14 | Stop reason: HOSPADM

## 2024-05-13 RX ORDER — DEXAMETHASONE SODIUM PHOSPHATE 4 MG/ML
INJECTION, SOLUTION INTRA-ARTICULAR; INTRALESIONAL; INTRAMUSCULAR; INTRAVENOUS; SOFT TISSUE
Status: DISCONTINUED | OUTPATIENT
Start: 2024-05-13 | End: 2024-05-13

## 2024-05-13 RX ORDER — SODIUM CHLORIDE, SODIUM LACTATE, POTASSIUM CHLORIDE, CALCIUM CHLORIDE 600; 310; 30; 20 MG/100ML; MG/100ML; MG/100ML; MG/100ML
INJECTION, SOLUTION INTRAVENOUS CONTINUOUS PRN
Status: DISCONTINUED | OUTPATIENT
Start: 2024-05-13 | End: 2024-05-13

## 2024-05-13 RX ORDER — PROMETHAZINE HYDROCHLORIDE 25 MG/ML
6.25 INJECTION, SOLUTION INTRAMUSCULAR; INTRAVENOUS EVERY 6 HOURS PRN
Status: DISCONTINUED | OUTPATIENT
Start: 2024-05-13 | End: 2024-05-14 | Stop reason: HOSPADM

## 2024-05-13 RX ORDER — ACETAMINOPHEN 325 MG/1
650 TABLET ORAL EVERY 8 HOURS PRN
Status: DISCONTINUED | OUTPATIENT
Start: 2024-05-13 | End: 2024-05-14 | Stop reason: HOSPADM

## 2024-05-13 RX ORDER — CEFAZOLIN SODIUM 2 G/50ML
2 SOLUTION INTRAVENOUS
Status: COMPLETED | OUTPATIENT
Start: 2024-05-13 | End: 2024-05-13

## 2024-05-13 RX ORDER — HYDROMORPHONE HYDROCHLORIDE 2 MG/ML
0.5 INJECTION, SOLUTION INTRAMUSCULAR; INTRAVENOUS; SUBCUTANEOUS EVERY 5 MIN PRN
Status: DISCONTINUED | OUTPATIENT
Start: 2024-05-13 | End: 2024-05-13 | Stop reason: HOSPADM

## 2024-05-13 RX ORDER — HYDROCODONE BITARTRATE AND ACETAMINOPHEN 10; 325 MG/1; MG/1
1 TABLET ORAL EVERY 4 HOURS PRN
Status: DISCONTINUED | OUTPATIENT
Start: 2024-05-13 | End: 2024-05-14 | Stop reason: HOSPADM

## 2024-05-13 RX ORDER — HYDROCODONE BITARTRATE AND ACETAMINOPHEN 10; 325 MG/1; MG/1
1 TABLET ORAL EVERY 6 HOURS PRN
Qty: 28 TABLET | Refills: 0 | Status: SHIPPED | OUTPATIENT
Start: 2024-05-13 | End: 2024-05-21

## 2024-05-13 RX ORDER — ONDANSETRON HYDROCHLORIDE 2 MG/ML
4 INJECTION, SOLUTION INTRAVENOUS ONCE AS NEEDED
Status: DISCONTINUED | OUTPATIENT
Start: 2024-05-13 | End: 2024-05-13 | Stop reason: HOSPADM

## 2024-05-13 RX ORDER — ROCURONIUM BROMIDE 10 MG/ML
INJECTION, SOLUTION INTRAVENOUS
Status: DISCONTINUED | OUTPATIENT
Start: 2024-05-13 | End: 2024-05-13

## 2024-05-13 RX ORDER — CELECOXIB 100 MG/1
200 CAPSULE ORAL 2 TIMES DAILY
Status: DISCONTINUED | OUTPATIENT
Start: 2024-05-13 | End: 2024-05-14 | Stop reason: HOSPADM

## 2024-05-13 RX ORDER — CLOPIDOGREL BISULFATE 75 MG/1
75 TABLET ORAL DAILY
Status: DISCONTINUED | OUTPATIENT
Start: 2024-05-14 | End: 2024-05-14 | Stop reason: HOSPADM

## 2024-05-13 RX ORDER — LATANOPROST 50 UG/ML
1 SOLUTION/ DROPS OPHTHALMIC NIGHTLY
Status: DISCONTINUED | OUTPATIENT
Start: 2024-05-13 | End: 2024-05-14 | Stop reason: HOSPADM

## 2024-05-13 RX ORDER — PROCHLORPERAZINE EDISYLATE 5 MG/ML
5 INJECTION INTRAMUSCULAR; INTRAVENOUS ONCE
Status: COMPLETED | OUTPATIENT
Start: 2024-05-13 | End: 2024-05-13

## 2024-05-13 RX ORDER — LIDOCAINE HYDROCHLORIDE 10 MG/ML
INJECTION, SOLUTION EPIDURAL; INFILTRATION; INTRACAUDAL; PERINEURAL
Status: DISCONTINUED | OUTPATIENT
Start: 2024-05-13 | End: 2024-05-13

## 2024-05-13 RX ORDER — ATORVASTATIN CALCIUM 40 MG/1
80 TABLET, FILM COATED ORAL DAILY
Status: DISCONTINUED | OUTPATIENT
Start: 2024-05-14 | End: 2024-05-14 | Stop reason: HOSPADM

## 2024-05-13 RX ORDER — METOPROLOL SUCCINATE 25 MG/1
25 TABLET, EXTENDED RELEASE ORAL DAILY
Status: DISCONTINUED | OUTPATIENT
Start: 2024-05-14 | End: 2024-05-14 | Stop reason: HOSPADM

## 2024-05-13 RX ORDER — SODIUM CHLORIDE 0.9 % (FLUSH) 0.9 %
10 SYRINGE (ML) INJECTION
Status: DISCONTINUED | OUTPATIENT
Start: 2024-05-13 | End: 2024-05-13 | Stop reason: HOSPADM

## 2024-05-13 RX ORDER — FENTANYL CITRATE 50 UG/ML
INJECTION, SOLUTION INTRAMUSCULAR; INTRAVENOUS
Status: DISCONTINUED | OUTPATIENT
Start: 2024-05-13 | End: 2024-05-13

## 2024-05-13 RX ORDER — TALC
6 POWDER (GRAM) TOPICAL NIGHTLY PRN
Status: DISCONTINUED | OUTPATIENT
Start: 2024-05-13 | End: 2024-05-14 | Stop reason: HOSPADM

## 2024-05-13 RX ORDER — ROPIVACAINE HYDROCHLORIDE 5 MG/ML
INJECTION, SOLUTION EPIDURAL; INFILTRATION; PERINEURAL
Status: DISCONTINUED | OUTPATIENT
Start: 2024-05-13 | End: 2024-05-13

## 2024-05-13 RX ORDER — ONDANSETRON HYDROCHLORIDE 2 MG/ML
4 INJECTION, SOLUTION INTRAVENOUS EVERY 12 HOURS PRN
Status: DISCONTINUED | OUTPATIENT
Start: 2024-05-13 | End: 2024-05-14 | Stop reason: HOSPADM

## 2024-05-13 RX ADMIN — FENTANYL CITRATE 50 MCG: 50 INJECTION INTRAMUSCULAR; INTRAVENOUS at 09:05

## 2024-05-13 RX ADMIN — CELECOXIB 200 MG: 100 CAPSULE ORAL at 08:05

## 2024-05-13 RX ADMIN — PROCHLORPERAZINE EDISYLATE 5 MG: 5 INJECTION INTRAMUSCULAR; INTRAVENOUS at 01:05

## 2024-05-13 RX ADMIN — ROPIVACAINE HYDROCHLORIDE 25 ML: 5 INJECTION, SOLUTION EPIDURAL; INFILTRATION; PERINEURAL at 01:05

## 2024-05-13 RX ADMIN — PROPOFOL 150 MG: 10 INJECTION, EMULSION INTRAVENOUS at 09:05

## 2024-05-13 RX ADMIN — ACETAMINOPHEN 1000 MG: 10 INJECTION, SOLUTION INTRAVENOUS at 09:05

## 2024-05-13 RX ADMIN — PHENYLEPHRINE HYDROCHLORIDE 100 MCG: 10 INJECTION INTRAVENOUS at 11:05

## 2024-05-13 RX ADMIN — FENTANYL CITRATE 50 MCG: 50 INJECTION INTRAMUSCULAR; INTRAVENOUS at 10:05

## 2024-05-13 RX ADMIN — SODIUM CHLORIDE, SODIUM LACTATE, POTASSIUM CHLORIDE, AND CALCIUM CHLORIDE: .6; .31; .03; .02 INJECTION, SOLUTION INTRAVENOUS at 09:05

## 2024-05-13 RX ADMIN — LATANOPROST 1 DROP: 50 SOLUTION OPHTHALMIC at 08:05

## 2024-05-13 RX ADMIN — PHENYLEPHRINE HYDROCHLORIDE 50 MCG: 10 INJECTION INTRAVENOUS at 10:05

## 2024-05-13 RX ADMIN — FAMOTIDINE 20 MG: 20 TABLET ORAL at 08:05

## 2024-05-13 RX ADMIN — HYDROMORPHONE HYDROCHLORIDE 0.2 MG: 2 INJECTION INTRAMUSCULAR; INTRAVENOUS; SUBCUTANEOUS at 12:05

## 2024-05-13 RX ADMIN — SUGAMMADEX 200 MG: 100 INJECTION, SOLUTION INTRAVENOUS at 12:05

## 2024-05-13 RX ADMIN — PROPOFOL 20 MG: 10 INJECTION, EMULSION INTRAVENOUS at 10:05

## 2024-05-13 RX ADMIN — HYDROMORPHONE HYDROCHLORIDE 0.5 MG: 2 INJECTION INTRAMUSCULAR; INTRAVENOUS; SUBCUTANEOUS at 01:05

## 2024-05-13 RX ADMIN — HYDROMORPHONE HYDROCHLORIDE 0.2 MG: 2 INJECTION INTRAMUSCULAR; INTRAVENOUS; SUBCUTANEOUS at 11:05

## 2024-05-13 RX ADMIN — CEFAZOLIN 2 G: 2 INJECTION, POWDER, FOR SOLUTION INTRAMUSCULAR; INTRAVENOUS at 10:05

## 2024-05-13 RX ADMIN — PHENYLEPHRINE HYDROCHLORIDE 100 MCG: 10 INJECTION INTRAVENOUS at 10:05

## 2024-05-13 RX ADMIN — PROPOFOL 30 MG: 10 INJECTION, EMULSION INTRAVENOUS at 10:05

## 2024-05-13 RX ADMIN — ONDANSETRON 4 MG: 2 INJECTION, SOLUTION INTRAMUSCULAR; INTRAVENOUS at 09:05

## 2024-05-13 RX ADMIN — ONDANSETRON HYDROCHLORIDE 4 MG: 2 INJECTION, SOLUTION INTRAVENOUS at 12:05

## 2024-05-13 RX ADMIN — PREGABALIN 75 MG: 75 CAPSULE ORAL at 08:05

## 2024-05-13 RX ADMIN — DEXAMETHASONE SODIUM PHOSPHATE 8 MG: 4 INJECTION, SOLUTION INTRA-ARTICULAR; INTRALESIONAL; INTRAMUSCULAR; INTRAVENOUS; SOFT TISSUE at 09:05

## 2024-05-13 RX ADMIN — HYDROMORPHONE HYDROCHLORIDE 0.5 MG: 2 INJECTION INTRAMUSCULAR; INTRAVENOUS; SUBCUTANEOUS at 12:05

## 2024-05-13 RX ADMIN — PHENYLEPHRINE HYDROCHLORIDE 200 MCG: 10 INJECTION INTRAVENOUS at 09:05

## 2024-05-13 RX ADMIN — ROCURONIUM BROMIDE 10 MG: 10 INJECTION, SOLUTION INTRAVENOUS at 11:05

## 2024-05-13 RX ADMIN — CEFAZOLIN SODIUM 2 G: 2 SOLUTION INTRAVENOUS at 09:05

## 2024-05-13 RX ADMIN — LIDOCAINE HYDROCHLORIDE 100 ML: 10 INJECTION, SOLUTION EPIDURAL; INFILTRATION; INTRACAUDAL; PERINEURAL at 09:05

## 2024-05-13 RX ADMIN — FENTANYL CITRATE 25 MCG: 50 INJECTION INTRAMUSCULAR; INTRAVENOUS at 10:05

## 2024-05-13 RX ADMIN — DOCUSATE SODIUM AND SENNOSIDES 1 TABLET: 8.6; 5 TABLET, FILM COATED ORAL at 08:05

## 2024-05-13 RX ADMIN — PROPOFOL 30 MG: 10 INJECTION, EMULSION INTRAVENOUS at 11:05

## 2024-05-13 NOTE — ANESTHESIA PROCEDURE NOTES
Intubation    Date/Time: 5/13/2024 9:47 AM    Performed by: Alfred Hurt Jr., CRNA  Authorized by: Zach Lara MD    Intubation:     Induction:  Intravenous    Intubated:  Postinduction    Mask Ventilation:  Easy mask    Attempts:  1    Attempted By:  CRNA and student    Method of Intubation:  Video laryngoscopy    Blade:  Garcia 3    Laryngeal View Grade: Grade I - full view of cords      Difficult Airway Encountered?: No      Complications:  None    Airway Device:  Oral endotracheal tube    Airway Device Size:  7.0    Style/Cuff Inflation:  Cuffed (inflated to minimal occlusive pressure)    Inflation Amount (mL):  5    Tube secured:  22    Secured at:  The lips    Placement Verified By:  Auscultation and Capnometry    Complicating Factors:  None    Findings Post-Intubation:  Bilateral breath sounds, positive ETCO2 and atraumatic / condition of teeth unchanged

## 2024-05-13 NOTE — H&P
Updated Orthopedic H&P    No changes in H&P below. OR today for ORIF Left proximal humerus.     Patient ID:   Romy Monae is a 73 y.o. female.     Chief Complaint:   Follow-up evaluation s/p closed reduction L proximal humerus fracture dislocation     HPI:   The patient is returning today for evaluation of her left shoulder.  She underwent closed reduction of a left proximal humerus fracture dislocation.  She was noticed to have a nondisplaced fracture of the surgical neck as well as a displaced fracture of the greater tuberosity.  The closed reduction was done under general anesthesia in the operating room with fluoroscopy.  A gentle closed reduction was successful.  After the closed reduction, the surgical neck remained nondisplaced and the tuberosity was well-positioned.  She is returning today for her 1st follow-up since the closed reduction.  She is currently in a abduction sling.  She reports pain in her left shoulder.     Medications:    Current Medications      Current Outpatient Medications:     apixaban (ELIQUIS) 5 mg Tab, Take 1 tablet (5 mg total) by mouth 2 (two) times daily., Disp: 180 tablet, Rfl: 3    atorvastatin (LIPITOR) 80 MG tablet, Take 1 tablet (80 mg total) by mouth once daily., Disp: 90 tablet, Rfl: 3    clopidogreL (PLAVIX) 75 mg tablet, Take 1 tablet (75 mg total) by mouth once daily., Disp: 30 tablet, Rfl: 11    HYDROcodone-acetaminophen (NORCO) 5-325 mg per tablet, Take 1 tablet by mouth every 6 (six) hours as needed for Pain., Disp: 28 tablet, Rfl: 0    latanoprost 0.005 % ophthalmic solution, Place 1 drop into both eyes every evening., Disp: , Rfl:     metoprolol succinate (TOPROL-XL) 25 MG 24 hr tablet, Take 1 tablet (25 mg total) by mouth once daily., Disp: 90 tablet, Rfl: 3    multivit-min/iron/folic/lutein (CENTRUM SILVER WOMEN ORAL), Take 1 tablet by mouth once daily., Disp: , Rfl:     ondansetron (ZOFRAN-ODT) 4 MG TbDL, Take 1 tablet (4 mg total) by mouth every 8 (eight) hours  as needed., Disp: 30 tablet, Rfl: 0        Allergies:  Review of patient's allergies indicates:  No Known Allergies     Vitals:  LMP  (LMP Unknown)      Physical Examination:  Ortho Exam   Left shoulder exam  There is swelling and ecchymoses in the left upper extremity.  Light touch is intact in the distributions of the axillary, musculocutaneous, radial, ulnar, and median distributions.  She does have tingling in the tip of her right thumb and her index finger.  She is able to fire her deltoid biceps and triceps.  She has intact wrist extensors, finger extensors, wrist flexors, and finger flexors.  She does have some stiffness when she attempts to make a  due to swelling in her fingers.        Imaging Studies:  I have ordered and independently reviewed the following imaging studies performed at [facility] today     X-Ray Shoulder Trauma 3 view Left  Narrative: EXAMINATION:  XR SHOULDER TRAUMA 3 VIEW LEFT     CLINICAL HISTORY:  . Pain in left shoulder     COMPARISON:  04/24/2024     TECHNIQUE:  Multiple views of the left shoulder.     FINDINGS:  There is redemonstration of a displaced transverse fracture through the neck of the humerus.  There is posterior rotation and impaction of the humeral head which remains roughly congruent with the bony glenoid.  Fracture lines extend into the greater tuberosity.  The humeral shaft is displaced approximately 1 shaft with anteriorly.     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     .  Impression: Redemonstration of displaced proximal humerus fracture as above     Electronically signed by:Sameer Harrell Jr  Date:                                        05/08/2024  Time:                                       12:37     Assessment:  1. Closed fracture dislocation of joint of left shoulder girdle with routine healing, subsequent encounter       Plan:  I reviewed the radiographic findings from today.  Although the humeral head  has remained reduced in the glenoid, the surgical neck fracture is now essentially 100% displaced.  I recommended surgical intervention to include open reduction internal fixation of the left proximal humerus.  She will need to stop taking Plavix and Eliquis.  I have recommended that she stopped taking the Plavix now and to stop taking the Eliquis 72 hours before surgery.  She will also go down for an EKG today.  Recent laboratory studies were found to be unremarkable.  We will plan to proceed with the surgery on Monday, May 13, 2024.      No follow-ups on file.    Blake Vizcaino MD    I have reviewed the H&P. There are no interval changes to report.

## 2024-05-13 NOTE — TRANSFER OF CARE
"Anesthesia Transfer of Care Note    Patient: Romy Monae    Procedure(s) Performed: Procedure(s) (LRB):  ORIF, FRACTURE, HUMERUS, PROXIMAL (Left)  REPAIR, ROTATOR CUFF (Left)  TENODESIS, BICEPS, OPEN (Left)    Patient location: PACU    Anesthesia Type: general    Transport from OR: Transported from OR on room air with adequate spontaneous ventilation    Post pain: adequate analgesia    Post assessment: no apparent anesthetic complications    Post vital signs: stable    Level of consciousness: awake    Nausea/Vomiting: no nausea/vomiting    Complications: none    Transfer of care protocol was followed      Last vitals: Visit Vitals  BP (!) 141/81   Pulse 98   Temp 37 °C (98.6 °F)   Resp (!) 26   Ht 5' 5" (1.651 m)   Wt 81.6 kg (180 lb)   LMP  (LMP Unknown)   SpO2 99%   Breastfeeding No   BMI 29.95 kg/m²     "

## 2024-05-13 NOTE — ANESTHESIA POSTPROCEDURE EVALUATION
Anesthesia Post Evaluation    Patient: Romy Monae    Procedure(s) Performed: Procedure(s) (LRB):  ORIF, FRACTURE, HUMERUS, PROXIMAL (Left)  REPAIR, ROTATOR CUFF (Left)  TENODESIS, BICEPS, OPEN (Left)    Final Anesthesia Type: general      Patient location during evaluation: PACU  Patient participation: Yes- Able to Participate  Level of consciousness: awake and alert  Post-procedure vital signs: reviewed and stable  Pain management: adequate  Airway patency: patent    PONV status at discharge: No PONV  Anesthetic complications: no      Cardiovascular status: stable  Respiratory status: spontaneous ventilation  Hydration status: euvolemic  Follow-up not needed.          Vitals Value Taken Time   /62 05/13/24 1630   Temp 36.6 °C (97.9 °F) 05/13/24 1350   Pulse 100 05/13/24 1630   Resp 18 05/13/24 1630   SpO2 98 % 05/13/24 1630         Event Time   Out of Recovery 13:47:48         Pain/Camden Score: Pain Rating Prior to Med Admin: 10 (5/13/2024  1:05 PM)  Pain Rating Post Med Admin: 0 (5/13/2024  1:50 PM)  Camden Score: 10 (5/13/2024  4:30 PM)

## 2024-05-13 NOTE — OP NOTE
Facility:  [facility]    Date of Surgery:  May 13, 2024    Surgeon:  Alfred Nunez MD    First Assistant:  Blake Vizcaino MD    Anesthesia:  GETA    Pre-operative Diagnosis:  Left closed displaced proximal humerus fracture    Indication:  Improve deformity    Post-operative Diagnosis:  Left closed displaced proximal humerus fracture  Left rotator cuff tear    Procedure:  Open reduction internal fixation left proximal humerus fracture  Left open rotator cuff repair  Left open biceps tenodesis    Implants:  Synthes 3.5 mm proximal humerus locking plate    The patient was identified in the pre-operative holding area. The correct extremity was marked and written informed consent was verified. The patient was transferred to the OR. The patient was placed on the OR table. General anesthesia was administered. The patient was positioned into a beach chair position. Fluoroscopy was brought in to ensure adequate ability to obtain images of the left shoulder. The operative extremity was prepped and draped in the usual sterile fashion. A surgical timeout was performed to verify the correct extremely and administration of IV antibiotics withing 30 minutes of surgery start time.     A deltopectoral approach was performed. The upper 1/3 of the pec major tendon was released to allow better exposure. The CA ligament was released. The long head of the biceps tendon was identified. The tendon was sutured to the upper pec major tendon with multiple 0 vicryl sutures. ) vicryl sutures were placed into the subscapularis, supraspinatus, and the infraspinatus to gain control of the head and greater tuberosity. While exposing the supraspinatus, there was evidence of a degenerative tear at the insertion site. Multiple 0 vicryl sutures in a figure of 8 fashion were used to repair the rotator cuff. The surgical neck fracture site was exposed. The shaft was medial to the head. The stay sutures were used to elevate the  head while a bone hook was used to dislodge the shaft and reduce it under the humeral head. Once the reduction was satisfactory, a large steinmann pin was passed from the shaft into the head for provisional fixation. The stay sutures in the rotator cuff was tied around the pin. Fluoroscopic views were obtained to verify satisfactory fracture reduction. A 3.5 mm proximal humerus plate was placed along the lateral humerus. The plate position was checked in orthogonal planes and secured with a single 3.5 mm cortical screw in the oblong hole. The plate was then secured with 4.0 mm locking screws in the proximal portion of the plate. Each screw length was determined by subtracting approximately 4 mm from the measured depth gauge length. The plate was then secured with 2 additional 3.5 mm cortical screws in the shaft portion of the plate. The back-up fixation, No 1 PDS sutures were placed at the tendon-bone junction of the subscapularis, supraspinatus, and infraspinatus and then tied into the sutures holes of the plate in a tension band fashion. After completing fixation, the humerus was well reduced and moved as a single unit without gapping. Fluoroscopic views were obtained to verify satisfactory fracture reduction, plate position, and screw lengths. The wound was copiously irrigated with saline. The deltopectoral interval was closed with 0 vicryl sutures with the cephalic superficial. The SQ layer was closed with interrupted 2-0 vicryl sutures. The skin wa sclosed with a running 3-0 monocryl and Steri-Strips.     A sterile dressing was placed. The patient was awakened and transferred to the PACU in stable condition.     EBL:  100 cc     Drains:  None    Complications:  None known

## 2024-05-13 NOTE — ANESTHESIA PROCEDURE NOTES
Peripheral Block    Patient location during procedure: post-op   Block not for primary anesthetic.  Reason for block: at surgeon's request and post-op pain management   Post-op Pain Location: left shoulder pain   Start time: 5/13/2024 1:22 PM  Timeout: 5/13/2024 1:20 PM   End time: 5/13/2024 1:27 PM    Staffing  Authorizing Provider: Zach Lara MD  Performing Provider: Angel Carty MD    Staffing  Performed by: Angel Carty MD  Authorized by: Zach Lara MD    Preanesthetic Checklist  Completed: patient identified, IV checked, site marked, risks and benefits discussed, surgical consent, monitors and equipment checked, pre-op evaluation and timeout performed  Peripheral Block  Patient position: sitting  Prep: ChloraPrep  Patient monitoring: heart rate, cardiac monitor, continuous pulse ox, continuous capnometry and frequent blood pressure checks  Block type: interscalene  Laterality: left  Injection technique: single shot  Needle  Needle type: Stimuplex   Needle gauge: 20 G  Needle length: 4 in  Needle localization: anatomical landmarks and ultrasound guidance   -ultrasound image captured on disc.  Assessment  Injection assessment: negative aspiration, negative parasthesia and local visualized surrounding nerve  Paresthesia pain: none  Heart rate change: no  Slow fractionated injection: yes    Medications:    Medications: ropivacaine (NAROPIN) injection 0.5% - Perineural   25 mL - 5/13/2024 1:26:00 PM    Additional Notes  VSS.  [facility] RN monitoring vitals throughout procedure.  Patient tolerated procedure well.

## 2024-05-13 NOTE — ANESTHESIA PREPROCEDURE EVALUATION
05/13/2024  Roym Monae is a 73 y.o., female.      Pre-op Assessment     I have reviewed the Nursing Notes.    I have reviewed the Medications.     Review of Systems  Anesthesia Hx:  No problems with previous Anesthesia             Denies Family Hx of Anesthesia complications.     Social:  Non-Smoker, No Alcohol Use       Hematology/Oncology:  Hematology Normal   Oncology Normal                                   EENT/Dental:  EENT/Dental Normal           Cardiovascular:  Exercise tolerance: good   Hypertension   CAD                  Coronary Artery Disease:                            Hypertension         Pulmonary:  Pulmonary Normal                       Renal/:  Renal/ Normal                 Hepatic/GI:     GERD      Gerd          Musculoskeletal:  Musculoskeletal Normal                Neurological:  Neurology Normal                                      Endocrine:  Endocrine Normal                Physical Exam  General: Well nourished    Airway:  Mallampati: II / II  Mouth Opening: Normal  TM Distance: Normal  Tongue: Normal  Neck ROM: Normal ROM    Dental:  Intact        Anesthesia Plan  Type of Anesthesia, risks & benefits discussed:    Anesthesia Type: Gen ETT  Intra-op Monitoring Plan: Standard ASA Monitors  Post Op Pain Control Plan: multimodal analgesia  Induction:  IV  Airway Plan: Direct, Post-Induction  Informed Consent: Informed consent signed with the Patient and all parties understand the risks and agree with anesthesia plan.  All questions answered.   ASA Score: 3    Ready For Surgery From Anesthesia Perspective.     .

## 2024-05-13 NOTE — NURSING
Arrived to room 534, assisted to bed via Surgical nurse Marialuisa, ice pack to left shoulder with surgical dressing clean dry and intact at present. Patient reports no pain at present, moving fingers which are warm. Bed locked in low with alarm on and call light in reach instructed to call for assistance to get up.

## 2024-05-14 VITALS
HEART RATE: 100 BPM | BODY MASS INDEX: 29.99 KG/M2 | TEMPERATURE: 98 F | OXYGEN SATURATION: 96 % | RESPIRATION RATE: 14 BRPM | WEIGHT: 180 LBS | SYSTOLIC BLOOD PRESSURE: 145 MMHG | DIASTOLIC BLOOD PRESSURE: 66 MMHG | HEIGHT: 65 IN

## 2024-05-14 PROCEDURE — 97530 THERAPEUTIC ACTIVITIES: CPT

## 2024-05-14 PROCEDURE — 94799 UNLISTED PULMONARY SVC/PX: CPT

## 2024-05-14 PROCEDURE — 63600175 PHARM REV CODE 636 W HCPCS: Performed by: STUDENT IN AN ORGANIZED HEALTH CARE EDUCATION/TRAINING PROGRAM

## 2024-05-14 PROCEDURE — 97535 SELF CARE MNGMENT TRAINING: CPT

## 2024-05-14 PROCEDURE — 25000003 PHARM REV CODE 250: Performed by: STUDENT IN AN ORGANIZED HEALTH CARE EDUCATION/TRAINING PROGRAM

## 2024-05-14 PROCEDURE — 94761 N-INVAS EAR/PLS OXIMETRY MLT: CPT

## 2024-05-14 PROCEDURE — 99900035 HC TECH TIME PER 15 MIN (STAT)

## 2024-05-14 PROCEDURE — 97165 OT EVAL LOW COMPLEX 30 MIN: CPT

## 2024-05-14 RX ADMIN — HYDROCODONE BITARTRATE AND ACETAMINOPHEN 1 TABLET: 10; 325 TABLET ORAL at 10:05

## 2024-05-14 RX ADMIN — PREGABALIN 75 MG: 75 CAPSULE ORAL at 08:05

## 2024-05-14 RX ADMIN — FAMOTIDINE 20 MG: 20 TABLET ORAL at 08:05

## 2024-05-14 RX ADMIN — METOPROLOL SUCCINATE 25 MG: 25 TABLET, EXTENDED RELEASE ORAL at 08:05

## 2024-05-14 RX ADMIN — APIXABAN 5 MG: 5 TABLET, FILM COATED ORAL at 08:05

## 2024-05-14 RX ADMIN — CEFAZOLIN 2 G: 2 INJECTION, POWDER, FOR SOLUTION INTRAMUSCULAR; INTRAVENOUS at 05:05

## 2024-05-14 RX ADMIN — HYDROCODONE BITARTRATE AND ACETAMINOPHEN 1 TABLET: 10; 325 TABLET ORAL at 04:05

## 2024-05-14 RX ADMIN — CELECOXIB 200 MG: 100 CAPSULE ORAL at 08:05

## 2024-05-14 RX ADMIN — CLOPIDOGREL BISULFATE 75 MG: 75 TABLET ORAL at 08:05

## 2024-05-14 RX ADMIN — DOCUSATE SODIUM AND SENNOSIDES 1 TABLET: 8.6; 5 TABLET, FILM COATED ORAL at 08:05

## 2024-05-14 RX ADMIN — ATORVASTATIN CALCIUM 80 MG: 40 TABLET, FILM COATED ORAL at 08:05

## 2024-05-14 NOTE — NURSING
Patient IV removed and vital signs within normal limits.    AVS given and VN notified.  Will continue to monitor.

## 2024-05-14 NOTE — PROGRESS NOTES
VIRTUAL NURSE:  Cued into patient's room.  Permission received per patient to turn camera to view patient.  Introduced as VN that will be working with floor nurse and nursing assistant.  Educated patient on VN's role in patient care and  VIP model.  Plan of care reviewed with patient.  Education per flowsheet.   Informed patient that staff will round on them every 2 hours but to use call light for any other needs they may have; informed of fall risk and fall precautions.  Patient verbalized understanding.  Call light within reach; bed siderails up x3.  Opportunity given for questions and questions answered.  Admission assessment questions answered.  Patient denies complaints or any needs at this time. Instructed to call for assistance.  Will cont to monitor and intervene as needed.    Labs, notes, orders, and careplan reviewed.        05/13/24 1933   Admission   Initial VN Admission Questions Complete   Communication Issues? None   Shift   Virtual Nurse - Rounding Complete   Pain Management Interventions quiet environment facilitated;relaxation techniques promoted   Virtual Nurse - Patient Verbalized Approval Of VN Rounding;Camera Use   Type of Frequent Check   Type Patient Rounds   Safety/Activity   Patient Rounds bed in low position;bed wheels locked;call light in patient/parent reach;clutter free environment maintained;placement of personal items at bedside;visualized patient   Safety Promotion/Fall Prevention instructed to call staff for mobility;Fall Risk reviewed with patient/family

## 2024-05-14 NOTE — PLAN OF CARE
OT evaluation completed. Patient with PLOF of left proximal humerus fracture dislocation after a fall 04/23/2024 treated with closed reduction of the left shoulder on 04/24/2024 and open reduction internal fixation left proximal humerus 05/13/2024; has not had any additional falls and has been supervision/modified independent for mobility and up to minimal assist for ADLs from spouse 2/2 LUE ROM restrictions. On evaluation this date, patient ambulatory with supervision to independence without avert LOB without device, and completing ADLs with setup to minimal assist. L shoulder pain increasing with out of bed activity. Skilled acute OT to follow. Recommend home with spouse support and low intensity therapy when cleared by orthopedic MD.  No DME needs.    Problem: Occupational Therapy  Goal: Occupational Therapy Goal  Description: Goals to be met by: 5/28/2024     Patient will increase functional independence with ADLs by performing:    UE Dressing with Contact Guard Assistance via adaptive technique  LE Dressing with Stand-by Assistance.  Toileting from toilet with Modified [place] for hygiene and clothing management.   Functional mobility to / from bathroom inclusive of Toilet transfer to toilet with Modified [place] without use of assistive device.  100% reciprocation of NWB LUE precautions, L elbow/wrist/hand gentle ROM, and ADL/task modifications post LUE ORIF to support safe d/c at highest level of function.          Outcome: Progressing

## 2024-05-14 NOTE — PLAN OF CARE
AAO,poc reviewed stated understanding,LUE in sling w dressing to shoulder intact dry,up to toilet w assist,scds to ble, safety reinforced,c/o pain, prns admin,fair relief.

## 2024-05-14 NOTE — PLAN OF CARE
The sw met with pt to complete the assessment. The pt was working with therapy when the sw entered the room. The pt lives in [place] with her spouse Steven Monae [phone]. The pt's spouse will transport her home at d/c. The pt's independent with most of her ADL's but her spouse is nearby to assist her as needed. The pt has the dme listed below. The pt has a very supportive family stating her 2 children live less than 5 minutes away and are available if needed. The sw completed the white board in the pt's room with her name and contact info. The sw left the pt a d/c brochure with her contact info on it. The sw encouraged her to call if she has any further questions or concerns. The sw will continue to follow the pt throughout her transitions of care and will assist with any d/c needs.     Future Appointments   Date Time Provider Department Center   5/28/2024 10:15 AM Alfred Nunez MD [id number] Surg  Discharge Assessment    Primary Care Provider: Rony Mayfield MD     Discharge Assessment (most recent)       BRIEF DISCHARGE ASSESSMENT - 05/14/24 0934          Discharge Planning    Assessment Type Discharge Planning Assessment (P)      Resource/Environmental Concerns none (P)      Support Systems Spouse/significant other;Children;Family members (P)      Equipment Currently Used at Home walker, rolling;cane, straight (P)    doesn't use the dme but she has it from past surgeries    Current Living Arrangements home (P)      Patient/Family Anticipates Transition to home with family (P)      Patient/Family Anticipated Services at Transition none (P)      DME Needed Upon Discharge  none (P)      Discharge Plan A Home with family (P)      Discharge Plan B Home Health (P)

## 2024-05-14 NOTE — PLAN OF CARE
VIRTUAL NURSE:  Labs, notes, orders, and careplan reviewed. VN to be available as needed.    Problem: Adult Inpatient Plan of Care  Goal: Plan of Care Review  5/14/2024 0833 by Bekah Trotter, RN  Outcome: Progressing  5/13/2024 1932 by Bekah Trotter, RN  Outcome: Progressing

## 2024-05-14 NOTE — DISCHARGE SUMMARY
[facility] Surg  Short Stay  Discharge Summary    Admit Date: 5/13/2024    Discharge Date and Time:  05/14/2024 8:22 AM      Discharge Attending Physician: Alfred Nunez MD     Hospital Course (synopsis of major diagnoses, care, treatment, and services provided during the course of the hospital stay): Patient presented to [facility] on day of scheduled surgery and underwent open reduction internal fixation left proximal humerus, please see operative report for further detail. Patient did well postoperatively and was found to be fit for discharge on POD# 1. Discharged home.      Final Diagnoses:    Principal Problem:  Left proximal humerus fracture dislocation    Discharged Condition: stable    Disposition: Home or Self Care    Follow up/Patient Instructions:    Medications:  Reconciled Home Medications:      Medication List        START taking these medications      cephALEXin 500 MG capsule  Commonly known as: KEFLEX  Take 1 capsule (500 mg total) by mouth 4 (four) times daily.  Notes to patient: Start tonight before you go to bed.     HYDROcodone-acetaminophen  mg per tablet  Commonly known as: NORCO  Take 1 tablet by mouth every 6 (six) hours as needed for Pain.  Replaces: HYDROcodone-acetaminophen 5-325 mg per tablet  Notes to patient: Take one before you go to sleep.            CHANGE how you take these medications      * ondansetron 4 MG Tbdl  Commonly known as: ZOFRAN-ODT  Take 1 tablet (4 mg total) by mouth every 8 (eight) hours as needed.  What changed: Another medication with the same name was added. Make sure you understand how and when to take each.     * ondansetron 4 MG Tbdl  Commonly known as: ZOFRAN-ODT  Take 1 tablet (4 mg total) by mouth once. for 1 dose  What changed: You were already taking a medication with the same name, and this prescription was added. Make sure you understand how and when to take each.           * This list has 2 medication(s) that are the same as other  medications prescribed for you. Read the directions carefully, and ask your doctor or other care provider to review them with you.                CONTINUE taking these medications      atorvastatin 80 MG tablet  Commonly known as: LIPITOR  Take 1 tablet (80 mg total) by mouth once daily.     CENTRUM SILVER WOMEN ORAL  Take 1 tablet by mouth once daily.     clopidogreL 75 mg tablet  Commonly known as: PLAVIX  Take 1 tablet (75 mg total) by mouth once daily.     ELIQUIS 5 mg Tab  Generic drug: apixaban  Take 1 tablet (5 mg total) by mouth 2 (two) times daily.     latanoprost 0.005 % ophthalmic solution  Place 1 drop into both eyes every evening.     metoprolol succinate 25 MG 24 hr tablet  Commonly known as: TOPROL-XL  Take 1 tablet (25 mg total) by mouth once daily.            STOP taking these medications      HYDROcodone-acetaminophen 5-325 mg per tablet  Commonly known as: NORCO  Replaced by: HYDROcodone-acetaminophen  mg per tablet            Discharge Procedure Orders   Diet Adult Regular     Ice to affected area     Lifting restrictions     Notify your health care provider if you experience any of the following:  difficulty breathing or increased cough     Notify your health care provider if you experience any of the following:  redness, tenderness, or signs of infection (pain, swelling, redness, odor or green/yellow discharge around incision site)     Notify your health care provider if you experience any of the following:  severe uncontrolled pain     Notify your health care provider if you experience any of the following:  persistent nausea and vomiting or diarrhea     Notify your health care provider if you experience any of the following:  temperature >100.4     Leave dressing on - Keep it clean, dry, and intact until clinic visit     Notify your health care provider if you experience any of the following:  redness, tenderness, or signs of infection (pain, swelling, redness, odor or green/yellow  discharge around incision site)     Notify your health care provider if you experience any of the following:  difficulty breathing or increased cough     Notify your health care provider if you experience any of the following:  severe uncontrolled pain     Notify your health care provider if you experience any of the following:  persistent nausea and vomiting or diarrhea     Notify your health care provider if you experience any of the following:  temperature >100.4     Leave dressing on - Keep it clean, dry, and intact until clinic visit     Weight bearing restrictions (specify):   Order Comments: RAYMON LEYVA     Weight bearing restrictions (specify):   Order Comments: RAYMON LEYVA       I have reviewed the notes, assessments, and/or procedures performed by Dr. Hwang, I concur with her/his documentation of Romy Monae.

## 2024-05-14 NOTE — PROGRESS NOTES
Virtual Nurse:Discharge orders noted; additional clinical references attached.  and pharmacy tech notified.  Patient's discharge instruction packet given by bedside RN.    Cued into patient's room.  Permission received per patient to turn camera to view patient.  Introduced as VN that will be instructing on discharge instructions.  Family member at bedside.  Educated patient on reason for admission; medications to hold, continue, and start, appointment to follow-up with doctor, and when to return to ED. Teach back method used. Verbalized understanding  Number given for 24/7 Nurse Line. Opportunity given for questions and questions answered.  Bedside nurse updated. Transport to [facility] requested.        05/14/24 1014   Shift   Virtual Nurse - Patient Verbalized Approval Of Camera Use;VN Rounding   Type of Frequent Check   Type Patient Rounds   Safety/Activity   Patient Rounds visualized patient

## 2024-05-14 NOTE — PT/OT/SLP EVAL
Occupational Therapy   Evaluation and treatment Note    Name: Romy Monae  MRN: [id number]  Admitting Diagnosis: <principal problem not specified>  Recent Surgery: Procedure(s) (LRB):  ORIF, FRACTURE, HUMERUS, PROXIMAL (Left)  REPAIR, ROTATOR CUFF (Left)  TENODESIS, BICEPS, OPEN (Left) 1 Day Post-Op    Recommendations:     Discharge Recommendations: Low Intensity Therapy (when cleared by Orthopedic MD)  Discharge Equipment Recommendations: none  Barriers to discharge:  None    Assessment:     Romy Monae is a 73 y.o. female with a medical diagnosis of <principal problem not specified>. At this time, patient is functioning at their prior level of function and does not require further acute OT services.     OT evaluation completed. On evaluation this date, patient ambulatory with supervision to independence without avert LOB without device, and completing ADLs with setup to minimal assist. L shoulder pain increasing with out of bed activity. Skilled acute OT to follow. Recommend home with spouse support and low intensity therapy when cleared by orthopedic MD.  No DME needs.      Plan:     During this hospitalization, patient does not require further acute OT services.  Please re-consult if situation changes.    Plan of Care Reviewed with: spouse, patient    Subjective     Chief Complaint: pain in L shoulder  Patient/Family Comments/goals: reports feeling well, and confident to go home    Occupational Profile:  Living Environment: resides with spouse in a single story home, threshold to enter, tub shower combo with tub transfer bench  Previous level of function: Patient with PLOF of left proximal humerus fracture dislocation after a fall 04/23/2024 treated with closed reduction of the left shoulder on 04/24/2024 and open reduction internal fixation left proximal humerus 05/13/2024; has not had any additional falls and has been supervision/modified independent for mobility and up to minimal assist for ADLs from spouse  2/2 LUE ROM restrictions. Spouse has been helping with IADLs/ home management and driving since the fall.   Equipment Used at home: other (see comments) (Has but was not using rolling walker and straight cane; using tub transfer bench)  Assistance upon Discharge: spouse; adult children reside nearby    Pain/Comfort:  Pain Rating 1: 1/10 (L shoulder)  Pain Addressed 1: Pre-medicate for activity, Distraction, Cessation of Activity, Nurse notified  Pain Rating Post-Intervention 1:  (increase moderate L shoulder; ice pack applied)      Objective:     Communicated with: nursing prior to session.  Patient found HOB elevated with SCD upon OT entry to room.    General Precautions: Standard, fall  Orthopedic Precautions: LUE non weight bearing (nonweightbearing left upper extremity, maintain sling, no shoulder range of motion, okay to work on elbow, wrist, finger range of motion)  Braces: UE Sling (abduction sling LUE)  Respiratory Status: Room air     Occupational Performance:    Bed Mobility:    Patient completed Supine to Sit with CGA, verbal / visual cues for technique to protect LUE    Functional Mobility/Transfers:  Patient completed Sit <> Stand Transfer with SBA  with  no assistive device   Patient completed Bed <> Chair Transfer using Step Transfer technique with close supervision / SBA with no assistive device  Patient completed Toilet Transfer Step Transfer technique with stand by assistance with  minimal verbal / affirmation cues for technique inclusive of feeling BLE against commode  Functional Mobility: in room mobility to  / from bathroom, standing at sink, and seated rest break eob with CGA no assistive device, quick progression to SBA. Out of room mobility with close SBA, no assistive device, slowed pace, minimal verbal cues for gaze ahead, no avert LOB. Mild complaints of fatigue    Activities of Daily Living:  Feeding:  set up  Grooming: set up ; standing at sink; using RUE only  Upper Body Dressing:  minimal assist; partial performance; teachback/adaptive approach; self reciprocates wearing of LUE shoulder abduction sling donning/doffing  Lower Body Dressing: contact guard assistance    Toileting: stand by assistance ; true bathroom; instructed on one handed strategies with teachback    Cognitive/Visual Perceptual:  Cognitive/Psychosocial Skills:     -       Oriented to: Person, Place, Time, and Situation   -       Follows Commands/attention:Follows multistep  commands  -       Communication: clear/fluent  -       Memory: No Deficits noted  -       Safety awareness/insight to disability: intact   -       Mood/Affect/Coping skills/emotional control: Pleasant  Visual/Perceptual:      -Intact      Physical Exam:  Postural examination/scapula alignment:    -       mildly flexed  Skin integrity: not observed; wound/ incision left shoulder  Sensation:reports mild tingling fingertips of L hand  Dominant hand: R  Upper Extremity Range of Motion:  RUE WFL; LUE shoulder not tested - 2/2 surgery; able to perform gentle limited ROM L elbow, wrist, hand  Upper Extremity Strength:    -       Right Upper Extremity: WFL  -       Left Upper Extremity:  not tested   Strength:    -       Right Upper Extremity: WFL  -       Left Upper Extremity:  able to perform gentle grasp release    AMPAC 6 Click ADL:  AMPAC Total Score: 20    Treatment & Education:  Patient educated on role of OT / POC development.  Patient with self awareness of NWB LUE and able to reciprocate with affirmation wearing scheduled of LUE sling.  Educated patient on placement of pillow vertically behind left shoulder for proper positioning.  Instructed/educated on importance of slow position changes and incorporation of B ankle pumps / gentle hand pumps in prep for transition / activity.  Instructed on above ADL / functional mobility with teachback / affirmation for NWB LUE, use of adaptive strategies, visual scanning techniques ahead for fall prevention, and  modification for routines.  Mobilized to chair with independence. Educated on importance of out of bed and mindful positioning of LUE in sling.  Affirmed strategy for shower bathing with tub transfer bench.  Educated / affirmed strategies for icing with barrier x15 minute duration at a time for pain management.  Answered inquiries in scope to patient and spouse whom affirms will be able to assist patient as needed.  Educated on recommendation for continued therapy, progression to OP when warranted by ortho MD.    Patient left up in chair with all lines intact, call button in reach, chair alarm on, and nursing notified, spouse present    GOALS:   Multidisciplinary Problems       Occupational Therapy Goals       Not on file              Multidisciplinary Problems (Resolved)          Problem: Occupational Therapy    Goal Priority Disciplines Outcome Interventions   Occupational Therapy Goal   (Resolved)     OT, PT/OT Met    Description: Goals to be met by: 5/28/2024     Patient will increase functional independence with ADLs by performing:    UE Dressing with Contact Guard Assistance via adaptive technique  LE Dressing with Stand-by Assistance.  Toileting from toilet with Modified [place] for hygiene and clothing management.   Functional mobility to / from bathroom inclusive of Toilet transfer to toilet with Modified [place] without use of assistive device.  100% reciprocation of NWB LUE precautions, L elbow/wrist/hand gentle ROM, and ADL/task modifications post LUE ORIF to support safe d/c at highest level of function.                               History:     Past Medical History:   Diagnosis Date    Fibula fracture     6/17         Past Surgical History:   Procedure Laterality Date    CLOSED REDUCTION OF INJURY OF SHOULDER Left 4/24/2024    Procedure: CLOSED REDUCTION, SHOULDER, possible open;  Surgeon: Alfred Nunez MD;  Location: [facility];  Service: Orthopedics;  Laterality: Left;    CORONARY  ANGIOGRAPHY N/A 9/30/2022    Procedure: ANGIOGRAM, CORONARY ARTERY;  Surgeon: Bernardo Bernard MD;  Location: [facility] CATH LAB/EP;  Service: Cardiology;  Laterality: N/A;    LEFT HEART CATHETERIZATION Left 9/30/2022    Procedure: Left heart cath;  Surgeon: Bernardo Bernard MD;  Location: [facility] CATH LAB/EP;  Service: Cardiology;  Laterality: Left;    OPEN REDUCTION AND INTERNAL FIXATION (ORIF) OF FRACTURE OF PROXIMAL HUMERUS Left 5/13/2024    Procedure: ORIF, FRACTURE, HUMERUS, PROXIMAL;  Surgeon: Alfred Nunez MD;  Location: [facility] OR;  Service: Orthopedics;  Laterality: Left;  [employer] proximal humerus locking plates, ezequiel pins, k-wires, beach chair, fluoro, No 1 PDS suture    PERCUTANEOUS TRANSLUMINAL BALLOON ANGIOPLASTY OF CORONARY ARTERY  9/30/2022    Procedure: Angioplasty-coronary;  Surgeon: Bernardo Bernard MD;  Location: [facility] CATH LAB/EP;  Service: Cardiology;;    ROTATOR CUFF REPAIR Left 5/13/2024    Procedure: REPAIR, ROTATOR CUFF;  Surgeon: Alfred Nunez MD;  Location: [facility] OR;  Service: Orthopedics;  Laterality: Left;    TENODESIS, BICEPS, OPEN Left 5/13/2024    Procedure: TENODESIS, BICEPS, OPEN;  Surgeon: Alfred Nunez MD;  Location: [facility];  Service: Orthopedics;  Laterality: Left;       Time Tracking:     OT Date of Treatment: 05/14/24  OT Start Time: 0857  OT Stop Time: 0938  OT Total Time (min): 41 min    Billable Minutes:Evaluation 11 min  Self Care/Home Management 15 min  Therapeutic Activity 15 min    5/14/2024

## 2024-05-14 NOTE — PT/OT/SLP PROGRESS
Physical Therapy      Patient Name:  Romy Monae   MRN:  [id number]    Patient not seen today secondary to therapist assessment. Per OT, no skilled acute PT needs identified as pt mobilizing without difficulty. RN reporting pt about to discharge home. Will DC orders at this time.

## 2024-05-14 NOTE — PROGRESS NOTES
The pt's spouse will transport her home at d/c. The sw stressed the importance of going to her hsp f/u's and taking her medications. The pt acknowledged understanding and states she will comply. The pt has no further Case Management needs and is clear to d/c.      Future Appointments   Date Time Provider Department Center   5/28/2024 10:15 AM Alfred Nunez MD [id number] Surg  Discharge Final Note    Primary Care Provider: Rony Mayfield MD    Expected Discharge Date: 5/14/2024    Final Discharge Note (most recent)       Final Note - 05/14/24 0934          Final Note    Assessment Type Discharge Planning Assessment                     Important Message from Medicare             Contact Info       Alfred Nunez MD   Specialty: Orthopedic Surgery    [address]  [address]  [place] [address]   Phone: [phone]       Next Steps: Follow up on 5/28/2024    Instructions: 10:15am [facility] F/U DX:L shoulder fx 5/13/24(no x-ray)

## 2024-05-14 NOTE — PROGRESS NOTES
"[facility] Orthopaedic Surgery Progress Note     In brief, 73 y.o. female who had a left proximal humerus fracture dislocation after a fall 04/23/2024 treated with closed reduction of the left shoulder on 04/24/2024 and open reduction internal fixation left proximal humerus 05/14/2024        S:  She is doing well.  Her pain is controlled this morning.  Denies nausea, vomiting, chest pain, shortness of breath.  She understands her weightbearing precautions     O:    Temp:  [97.7 °F (36.5 °C)-98.6 °F (37 °C)] 98 °F (36.7 °C)  Pulse:  [] 100  Resp:  [12-33] 19  SpO2:  [93 %-100 %] 96 %  BP: (132-190)/(61-81) 145/66      MSK:     Left upper extremity  Dressing: C/D/I, sling  TTP:  Compartments: soft and compressible   Motor Intact:  AIN/PIN/ulnar  SILT:  Altered sensation over the thumb and index finger consistent with her preoperative exam, sensation intact to light touch in the remainder of the radial and median distributions, sensation intact to light touch ulnar nerve distribution, ax sensation intact  Pulses palpable radial pulse     Labs:  No results found for this or any previous visit (from the past 24 hour(s)).    No results for input(s): "WBC", "HGB", "HCT", "PLT" in the last 72 hours.  No results for input(s): "NA", "K", "CL", "CO2", "HCO3C", "BUN", "LABCREA", "GLU" in the last 72 hours.  No results for input(s): "ESR", "CRP" in the last 72 hours.     Imaging:  No new imaging     Assessment/Plan:     73 y.o. female status post open reduction internal fixation left proximal humerus 05/14/2024    -nonweightbearing left upper extremity, maintain sling, no shoulder range of motion, okay to work on elbow, wrist, finger range of motion  -ice, multimodal pain control  -okay to restart anticoagulation  -we will discharge today    Discussed with Dr Mayra Hwang MD  [facility] Orthopaedic Surgery  5/14/2024 8:15 AM     I have reviewed the notes, assessments, and/or procedures performed by Dr. Hwang, I concur " with her/his documentation of Romy Monae.

## 2024-05-14 NOTE — DISCHARGE SUMMARY
[facility]  Discharge Note  Short Stay    Procedure(s) (LRB):  ORIF, FRACTURE, HUMERUS, PROXIMAL (Left)  REPAIR, ROTATOR CUFF (Left)  TENODESIS, BICEPS, OPEN (Left)      OUTCOME: Patient tolerated treatment/procedure well without complication and is now ready for discharge.    DISPOSITION: Home or Self Care    FINAL DIAGNOSIS:  <principal problem not specified>    FOLLOWUP: In clinic    DISCHARGE INSTRUCTIONS:    Discharge Procedure Orders   Diet Adult Regular     Ice to affected area     Lifting restrictions     Notify your health care provider if you experience any of the following:  difficulty breathing or increased cough     Notify your health care provider if you experience any of the following:  redness, tenderness, or signs of infection (pain, swelling, redness, odor or green/yellow discharge around incision site)     Notify your health care provider if you experience any of the following:  severe uncontrolled pain     Notify your health care provider if you experience any of the following:  persistent nausea and vomiting or diarrhea     Notify your health care provider if you experience any of the following:  temperature >100.4     Leave dressing on - Keep it clean, dry, and intact until clinic visit     Notify your health care provider if you experience any of the following:  redness, tenderness, or signs of infection (pain, swelling, redness, odor or green/yellow discharge around incision site)     Notify your health care provider if you experience any of the following:  difficulty breathing or increased cough     Notify your health care provider if you experience any of the following:  severe uncontrolled pain     Notify your health care provider if you experience any of the following:  persistent nausea and vomiting or diarrhea     Notify your health care provider if you experience any of the following:  temperature >100.4     Leave dressing on - Keep it clean, dry, and intact until clinic visit      Weight bearing restrictions (specify):   Order Comments: RAYMON LEYVA     Weight bearing restrictions (specify):   Order Comments: RAYMON LEYVA         Clinical Reference Documents Added to Patient Instructions         Document    HOW TO USE A SHOULDER SLING (ENGLISH)    ROTATOR CUFF REPAIR DISCHARGE INSTRUCTIONS (ENGLISH)    UPPER ARM FRACTURE (ENGLISH)            TIME SPENT ON DISCHARGE: 30 minutes    Blake Vizcaino MD  [facility]     I have reviewed the notes, assessments, and/or procedures performed by Dr. Vizcaino, I concur with her/his documentation of Romy Monae.

## 2024-05-14 NOTE — CONSULTS
"Consult note  [facility]    Consulted by:Blake Vizcaino MD  Reason for Consult: "IF PATIENT IS ADMITTED PLEASE EVALUATE AND TREAT"     History of Present Illness:  Patient is a 73 y.o. female with PMHx of HTN, HLD, CAD (s/p LAD stent), Hypercoagulable state, Antithrombin III deficiency, and History of DVTs, is status post Left Humerus ORIF    PTA Medications   Medication Sig    apixaban (ELIQUIS) 5 mg Tab Take 1 tablet (5 mg total) by mouth 2 (two) times daily.    atorvastatin (LIPITOR) 80 MG tablet Take 1 tablet (80 mg total) by mouth once daily.    latanoprost 0.005 % ophthalmic solution Place 1 drop into both eyes every evening.    metoprolol succinate (TOPROL-XL) 25 MG 24 hr tablet Take 1 tablet (25 mg total) by mouth once daily.    multivit-min/iron/folic/lutein (CENTRUM SILVER WOMEN ORAL) Take 1 tablet by mouth once daily.    ondansetron (ZOFRAN-ODT) 4 MG TbDL Take 1 tablet (4 mg total) by mouth every 8 (eight) hours as needed.    clopidogreL (PLAVIX) 75 mg tablet Take 1 tablet (75 mg total) by mouth once daily.    [DISCONTINUED] HYDROcodone-acetaminophen (NORCO) 5-325 mg per tablet Take 1 tablet by mouth every 6 (six) hours as needed for Pain.       Review of patient's allergies indicates:  No Known Allergies    Past Medical History:   Diagnosis Date    Fibula fracture     6/17     Past Surgical History:   Procedure Laterality Date    CLOSED REDUCTION OF INJURY OF SHOULDER Left 4/24/2024    Procedure: CLOSED REDUCTION, SHOULDER, possible open;  Surgeon: Alfred Nunez MD;  Location: [facility] OR;  Service: Orthopedics;  Laterality: Left;    CORONARY ANGIOGRAPHY N/A 9/30/2022    Procedure: ANGIOGRAM, CORONARY ARTERY;  Surgeon: Bernardo Bernard MD;  Location: [facility] CATH LAB/EP;  Service: Cardiology;  Laterality: N/A;    LEFT HEART CATHETERIZATION Left 9/30/2022    Procedure: Left heart cath;  Surgeon: Bernardo Bernard MD;  Location: [facility] CATH LAB/EP;  Service: Cardiology;  Laterality: Left;    " PERCUTANEOUS TRANSLUMINAL BALLOON ANGIOPLASTY OF CORONARY ARTERY  9/30/2022    Procedure: Angioplasty-coronary;  Surgeon: Bernardo Bernard MD;  Location: [facility] CATH LAB/EP;  Service: Cardiology;;     No family history on file.  Social History     Tobacco Use    Smoking status: Never    Smokeless tobacco: Never   Substance Use Topics    Alcohol use: No        Review of Systems:   Review of Systems   Constitutional:  Negative for chills and fever.   Respiratory:  Negative for shortness of breath.    Cardiovascular:  Negative for chest pain.   Genitourinary:  Negative for dysuria.   Neurological:  Negative for headaches.      OBJECTIVE:     Vital Signs (Most Recent)  Temp: 98.6 °F (37 °C) (05/13/24 1820)  Pulse: 91 (05/13/24 1820)  Resp: 14 (05/13/24 1820)  BP: (!) 151/68 (05/13/24 1820)  SpO2: 95 % (05/13/24 1820)    Physical Exam:  Physical Exam  Constitutional:       General: She is not in acute distress.     Appearance: She is not ill-appearing.   Eyes:      General:         Right eye: No discharge.      Conjunctiva/sclera: Conjunctivae normal.   Cardiovascular:      Rate and Rhythm: Normal rate.   Pulmonary:      Effort: Pulmonary effort is normal. No respiratory distress.   Abdominal:      Palpations: Abdomen is soft.      Tenderness: There is no abdominal tenderness. There is no guarding or rebound.   Musculoskeletal:      Comments: Left arm in abduction sling   Skin:     General: Skin is warm.   Neurological:      Mental Status: She is alert and oriented to person, place, and time.   Psychiatric:         Mood and Affect: Mood normal.         Behavior: Behavior normal.          Laboratory  Lab Results   Component Value Date    WBC 11.72 04/24/2024    HGB 13.1 04/24/2024    HCT 37.8 04/24/2024    MCV 88 04/24/2024     04/24/2024      CMP  Sodium   Date Value Ref Range Status   04/24/2024 139 136 - 145 mmol/L Final     Potassium   Date Value Ref Range Status   04/24/2024 3.6 3.5 - 5.1 mmol/L Final  "    Chloride   Date Value Ref Range Status   04/24/2024 106 95 - 110 mmol/L Final     CO2   Date Value Ref Range Status   04/24/2024 18 (L) 23 - 29 mmol/L Final     Glucose   Date Value Ref Range Status   04/24/2024 156 (H) 70 - 110 mg/dL Final     BUN   Date Value Ref Range Status   04/24/2024 16 8 - 23 mg/dL Final   12/17/2021 17 7 - 21 mg/dL Final     Creatinine   Date Value Ref Range Status   04/24/2024 0.9 0.5 - 1.4 mg/dL Final     Calcium   Date Value Ref Range Status   04/24/2024 9.1 8.7 - 10.5 mg/dL Final     Total Protein   Date Value Ref Range Status   04/24/2024 7.1 6.0 - 8.4 g/dL Final     Albumin   Date Value Ref Range Status   04/24/2024 3.7 3.5 - 5.2 g/dL Final     Total Bilirubin   Date Value Ref Range Status   04/24/2024 0.9 0.1 - 1.0 mg/dL Final     Comment:     For infants and newborns, interpretation of results should be based  on gestational age, weight and in agreement with clinical  observations.    Premature Infant recommended reference ranges:  Up to 24 hours.............<8.0 mg/dL  Up to 48 hours............<12.0 mg/dL  3-5 days..................<15.0 mg/dL  6-29 days.................<15.0 mg/dL       Alkaline Phosphatase   Date Value Ref Range Status   04/24/2024 95 55 - 135 U/L Final     AST   Date Value Ref Range Status   04/24/2024 30 10 - 40 U/L Final     ALT   Date Value Ref Range Status   04/24/2024 31 10 - 44 U/L Final     Anion Gap   Date Value Ref Range Status   04/24/2024 15 8 - 16 mmol/L Final   12/17/2021 17 9 - 18 mEq/L Final     eGFR if    Date Value Ref Range Status   06/25/2019 82 > OR = 60 mL/min/1.73m2 Final     eGFR if non    Date Value Ref Range Status   06/25/2019 71 > OR = 60 mL/min/1.73m2 Final        Lab Results   Component Value Date    INR 1.0 09/30/2022    INR 1.0 06/03/2018    INR 1.0 06/03/2018      No results for input(s): "CPK", "CPKMB", "TROPONINI", "MB" in the last 168 hours.   No results for input(s): "TROPONINI", "CKTOTAL", " ""CKMB" in the last 168 hours.    BNP  No results for input(s): "BNP" in the last 168 hours.    Urinalysis  No results for input(s): "COLORU", "CLARITYU", "SPECGRAV", "PHUR", "PROTEINUA", "GLUCOSEU", "BILIRUBINCON", "BLOODU", "WBCU", "RBCU", "BACTERIA", "MUCUS", "NITRITE", "LEUKOCYTESUR", "UROBILINOGEN", "HYALINECASTS" in the last 24 hours.   LAST HbA1c  Lab Results   Component Value Date    HGBA1C 5.2 06/03/2018         Diagnostic Results:  Labs: Reviewed  X-Ray: Reviewed      ASSESSMENT/PLAN:   73 y.o.female with PMHx of HTN, HLD, CAD (s/p LAD stent), Hypercoagulable state, Antithrombin III deficiency, and History of DVTs, is status post Left Humerus ORIF    Plan:     Hypertension  - Home regimen: Metoprolol 25mg  - As Metoprolol is not an anti-hypertensive, will hold medication; will consider a different agent if pressures increase    Hyperlipidemia  - Home regimen: Atorvastatin 80mg  - Continue home medication    Hypercoagulable state  Antithrombin III deficiency  History of DVT  - Home regimen: Eliquis BID, Plavix QD  - To commence 5/14 per ortho    Family medicine will continue to follow. Please contact us if you have any further questions. Thank you for the consult.     Corrina Dickey MD  [facility], PGY1    "

## 2024-05-15 ENCOUNTER — NURSE TRIAGE (OUTPATIENT)
Dept: ADMINISTRATIVE | Facility: CLINIC | Age: 74
End: 2024-05-15
Payer: MEDICARE

## 2024-05-15 NOTE — TELEPHONE ENCOUNTER
Reason for Disposition   Health information question, no triage required and triager able to answer question    Protocols used: Information Only Call - No Triage-A-[place]

## 2024-05-15 NOTE — TELEPHONE ENCOUNTER
Pt calling about dressing change and told to keep in place until her follow up and also pt said that pain medication she thinks is too strong and if she can take the lower dose that she had before surgery. Pt told that she definitely can always take lower but keep track of when she takes it and if needs the higher just make sure not to exceed how prescribed. Pt said that all she does is sleep. Pt told o will let MD know and she will call back if any other questions or concerns.Care advice home care

## 2024-05-20 ENCOUNTER — PATIENT MESSAGE (OUTPATIENT)
Dept: ORTHOPEDICS | Facility: CLINIC | Age: 74
End: 2024-05-20
Payer: MEDICARE

## 2024-05-21 RX ORDER — DIAZEPAM 5 MG/1
5 TABLET ORAL EVERY 8 HOURS PRN
Qty: 30 TABLET | Refills: 0 | Status: SHIPPED | OUTPATIENT
Start: 2024-05-21 | End: 2024-05-31

## 2024-05-21 NOTE — PROGRESS NOTES
I telephoned the patient's son Alfred.  He telephoned the office yesterday reporting that his mother was having difficulty with sleep.  He was asking if I could prescribe something to help her sleep better.  He reports that she does not like to take the Norco because it gives her bad dreams.  I have recommended that she stop taking the Norco.  I have advised Tylenol extra-strength 2 tablets every 8 hours as needed for pain.  In addition, I will call in a prescription for Valium 5 mg every 8 hours as needed for anxiety/muscle spasms.  I advised that she take the Valium before she goes to bed at night as this may potentially help her sleep better.  She will be following up with me next week for her 1st postoperative visit.

## 2024-05-28 ENCOUNTER — OFFICE VISIT (OUTPATIENT)
Dept: ORTHOPEDICS | Facility: CLINIC | Age: 74
End: 2024-05-28
Payer: MEDICARE

## 2024-05-28 VITALS
DIASTOLIC BLOOD PRESSURE: 74 MMHG | HEART RATE: 80 BPM | BODY MASS INDEX: 30.12 KG/M2 | SYSTOLIC BLOOD PRESSURE: 151 MMHG | HEIGHT: 65 IN | WEIGHT: 180.75 LBS

## 2024-05-28 DIAGNOSIS — S42.92XD CLOSED FRACTURE DISLOCATION OF JOINT OF LEFT SHOULDER GIRDLE WITH ROUTINE HEALING, SUBSEQUENT ENCOUNTER: Primary | ICD-10-CM

## 2024-05-28 DIAGNOSIS — M25.512 LEFT SHOULDER PAIN, UNSPECIFIED CHRONICITY: Primary | ICD-10-CM

## 2024-05-28 PROCEDURE — 99999 PR PBB SHADOW E&M-EST. PATIENT-LVL III: CPT | Mod: PBBFAC,,, | Performed by: ORTHOPAEDIC SURGERY

## 2024-05-28 PROCEDURE — 99024 POSTOP FOLLOW-UP VISIT: CPT | Mod: S$GLB,,, | Performed by: ORTHOPAEDIC SURGERY

## 2024-05-28 PROCEDURE — 1126F AMNT PAIN NOTED NONE PRSNT: CPT | Mod: CPTII,S$GLB,, | Performed by: ORTHOPAEDIC SURGERY

## 2024-05-28 PROCEDURE — 3078F DIAST BP <80 MM HG: CPT | Mod: CPTII,S$GLB,, | Performed by: ORTHOPAEDIC SURGERY

## 2024-05-28 PROCEDURE — 1160F RVW MEDS BY RX/DR IN RCRD: CPT | Mod: CPTII,S$GLB,, | Performed by: ORTHOPAEDIC SURGERY

## 2024-05-28 PROCEDURE — 3077F SYST BP >= 140 MM HG: CPT | Mod: CPTII,S$GLB,, | Performed by: ORTHOPAEDIC SURGERY

## 2024-05-28 PROCEDURE — 1159F MED LIST DOCD IN RCRD: CPT | Mod: CPTII,S$GLB,, | Performed by: ORTHOPAEDIC SURGERY

## 2024-05-28 NOTE — PROGRESS NOTES
"Patient ID:   Romy Monae is a 73 y.o. female.    Chief Complaint:   Surgical aftercare status post open reduction internal fixation left proximal humerus fracture    HPI:   The patient is returning for her 1st postoperative visit.  Pain level is reported to be 0/10.  She is wearing her sling.    Medications:    Current Outpatient Medications:     apixaban (ELIQUIS) 5 mg Tab, Take 1 tablet (5 mg total) by mouth 2 (two) times daily., Disp: 180 tablet, Rfl: 3    atorvastatin (LIPITOR) 80 MG tablet, Take 1 tablet (80 mg total) by mouth once daily., Disp: 90 tablet, Rfl: 3    clopidogreL (PLAVIX) 75 mg tablet, Take 1 tablet (75 mg total) by mouth once daily., Disp: 30 tablet, Rfl: 11    diazePAM (VALIUM) 5 MG tablet, Take 1 tablet (5 mg total) by mouth every 8 (eight) hours as needed for Anxiety., Disp: 30 tablet, Rfl: 0    latanoprost 0.005 % ophthalmic solution, Place 1 drop into both eyes every evening., Disp: , Rfl:     metoprolol succinate (TOPROL-XL) 25 MG 24 hr tablet, Take 1 tablet (25 mg total) by mouth once daily., Disp: 90 tablet, Rfl: 3    multivit-min/iron/folic/lutein (CENTRUM SILVER WOMEN ORAL), Take 1 tablet by mouth once daily., Disp: , Rfl:     Allergies:  Review of patient's allergies indicates:  No Known Allergies    Vitals:  BP (!) 151/74   Pulse 80   Ht 5' 5" (1.651 m)   Wt 82 kg (180 lb 12.4 oz)   LMP  (LMP Unknown)   BMI 30.08 kg/m²     Physical Examination:  Ortho Exam   Skin incision is clean, dry, intact    Assessment:  1. Closed fracture dislocation of joint of left shoulder girdle with routine healing, subsequent encounter        Plan:  The patient will start formal supervised PT/OT for passive range of motion of the left shoulder.  She will follow up in 1 month with a new x-ray of the left shoulder.    Orders Placed This Encounter    Ambulatory referral/consult to Physical/Occupational Therapy     No follow-ups on file.          "

## 2024-05-29 ENCOUNTER — CLINICAL SUPPORT (OUTPATIENT)
Dept: REHABILITATION | Facility: HOSPITAL | Age: 74
End: 2024-05-29
Attending: ORTHOPAEDIC SURGERY
Payer: MEDICARE

## 2024-05-29 DIAGNOSIS — S42.92XD CLOSED FRACTURE DISLOCATION OF JOINT OF LEFT SHOULDER GIRDLE WITH ROUTINE HEALING, SUBSEQUENT ENCOUNTER: ICD-10-CM

## 2024-05-29 DIAGNOSIS — M25.612 DECREASED ROM OF LEFT SHOULDER: Primary | ICD-10-CM

## 2024-05-29 DIAGNOSIS — R53.1 DECREASED STRENGTH: ICD-10-CM

## 2024-05-29 PROCEDURE — 97162 PT EVAL MOD COMPLEX 30 MIN: CPT | Mod: PN

## 2024-05-29 PROCEDURE — 97110 THERAPEUTIC EXERCISES: CPT | Mod: PN

## 2024-05-29 PROCEDURE — 97140 MANUAL THERAPY 1/> REGIONS: CPT | Mod: PN

## 2024-05-29 NOTE — PLAN OF CARE
[facility]   Physical Therapy Initial Evaluation      Name: Romy Monae  Clinic Number: [id number]    Therapy Diagnosis:   Encounter Diagnoses   Name Primary?    Closed fracture dislocation of joint of left shoulder girdle with routine healing, subsequent encounter     Decreased ROM of left shoulder Yes    Decreased strength         Physician: Alfred Nunez MD    Physician Orders: PT Eval and Treat   Medical Diagnosis from Referral:   Diagnosis   S42.92XD (ICD-10-CM) - Closed fracture dislocation of joint of left shoulder girdle with routine healing, subsequent encounter     Evaluation Date: 5/29/2024  Authorization Period Expiration: 12/31/2024  Plan of Care Expiration: 8/09/2024  Progress Note Due: 7/5/2024  Date of Surgery: 5/13/2024-  status post open reduction internal fixation left proximal humerus fracture   Visit # / Visits authorized: 1/ 20   FOTO: 1/5    Precautions: Standard; Passive ROM (supine elevation, ER) and Elbow/wrist/digital ROM    Time In: 3:05 pm   Time Out: 4:00 pm   Total Billable Time: 55 minutes    Subjective     Date of surgery: 5/13/2024-  status post open reduction internal fixation left proximal humerus fracture     History of current condition - Romy reports she tripped over her husbands feet and landing on her left upper extremity with her arm extended. Has to re-set the arm and found out weeks later that bone was displaced. Since the fall, pins and needles constantly in index finger and thumb. Has not gotten worse or better since surgery. MD told her she can take off sling while relaxing on sofa while having it propped. Other than that, wear sling 24/7.     Falls: none since initial injury     Imaging: see EMR    Prior Therapy: yes, broken leg   Social History: lives with  in one story home    Occupation: retired    Prior Level of Function: independent   Current Level of Function: dependent on  for ADLs such as showering,  dressing, laundry, intense cooking. Assisting in cooking and will use operated upper extremity for chopping.    Pain:  Current 0/10, worst 8/10, best 0/10   Location: left shoulder    Description: Aching, Throbbing, Tingling, Numb, and Sharp  Aggravating Factors: bending forwards   Easing Factors: ice and rest    Patients goals: return to prior levels of function and ADL's     Medical History:   Past Medical History:   Diagnosis Date    Fibula fracture     6/17       Surgical History:   Romy Monae  has a past surgical history that includes Left heart catheterization (Left, 9/30/2022); Coronary angiography (N/A, 9/30/2022); Percutaneous transluminal balloon angioplasty of coronary artery (9/30/2022); Closed reduction of injury of shoulder (Left, 4/24/2024); Open reduction and internal fixation (ORIF) of fracture of proximal humerus (Left, 5/13/2024); Rotator cuff repair (Left, 5/13/2024); and tenodesis, biceps, open (Left, 5/13/2024).    Medications:   Romy has a current medication list which includes the following prescription(s): apixaban, atorvastatin, clopidogrel, diazepam, latanoprost, metoprolol succinate, and multivit-min/iron/fa/vit k/lut.    Allergies:   Review of patient's allergies indicates:  No Known Allergies     Objective        Observation/Posture:   Cervical: FHP  Thoracic: kyphotic   Shoulders: rounded; slight bruising surrounding pectoralis major   Scapulae: protracted    Elbow: significant swelling at distal biceps/elbow with orange peel skin appearance; gross bruising present at biceps origin, muscle belly, and insertion  Sling: intact; wrist positioned below elbow, slight left shoulder hike    incision: no drainage; healing well     Palpation: gross tenderness to palpation at gross tenderness upon palpation to anterior and posterior shoulder girdle; significant tenderness to palpation of distal biceps    Range of Motion: passive  Shoulder flexion (60-90°): 20 degrees   Shoulder external  rotation @ 20° abduction: +15 degrees; unable to achieve neutral  Elbow flexion: 123 degrees -- 130 degrees (after STM)  Elbow extension: +60 degrees  -- 40 degrees (after STM)    Upper Extremity Strength  *=pain   Left  Right  Notes   Shoulder flexion TN 4+/5    Shoulder abduction NT 4+/5    Shoulder external rotation  NT 4/5    Shoulder internal rotation  NT 4+/5    Elbow flexion NT 5/5    Elbow extension NT 5/5        Intake Outcome Measure for FOTO Shoulder Survey    Therapist reviewed FOTO scores for Romy Monae on 5/29/2024.   FOTO report - see Media section or FOTO account episode details.    Intake Score: 58%         Treatment     Total Treatment time (time-based codes) separate from Evaluation: 23 minutes     Romy received the treatments listed below:      therapeutic exercises to develop strength, endurance, and ROM for 8 minutes including:    Seated:   Elbow flexion/extension in sling: 10x  Wrist flexion/extension in sling: 10x   Hand pumps: 10x  Shoulder shrugs: 10x     manual therapy techniques: Joint mobilizations, Myofacial release, and Soft tissue Mobilization were applied to the for 15 minutes, including:    Other: Adjustment of brace positioning to promote alignment of elbow with shoulder and promote shoulder relaxation/prevent shrug  Soft tissue mobilizations to biceps and forearm   Effleurage massage for edema relief    Patient Education and Home Exercises     Education provided:   - Findings; prognosis and plan of care  - Home exercise program  - Modality options  - Therapist contact information  +Proper positioning of shoulder at all times for the next (at least) 6 weeks- neutral with abduction pillow for 4 weeks, sling for 6 weeks  +Discussed how he will protect the incision site with clear wrap or a trash bag and/or with a cheap sling and rolled towel while showering- will have/need assistance  +Discussed the rotator cuff repairs (no matter the size) repair at 10% each month. He will not be  cleared to return to full duty at least until 4-6 months post-operatively.  +Discussed different exercises, movements and how much the supraspinatus EMG activity is required.  +Warned against leaning over and squeezing any objects (such as a stress ball).  +Discussed that no true tendon-to-bone binding for ~ 6 weeks, Full scar maturation 15 weeks at the earliet  +Discussed phases of healing: Phase I quiet phase, Phase II moderate protective phase, Phase III strengthening and Phase IV return to functional strength, ROM.  +Discussed that healing will vary depending on size of tear, what was repaired, etc.  +Discussed that patient can begin scapular retractions, shoulder shrugs and supported elbow flexion/extension.      Written Home Exercises Provided: yes.  Exercises were reviewed and Romy was able to demonstrate them prior to the end of the session.  Romy demonstrated good understanding of the education provided. See EMR under Patient Instructions for exercises provided during therapy sessions.    Assessment     Romy is a 73 y.o. female referred to outpatient Physical Therapy with a medical diagnosis of S42.92XD (ICD-10-CM) - Closed fracture dislocation of joint of left shoulder girdle with routine healing, subsequent encounter. Patient 2 weeks s/p open reduction internal fixation left proximal humerus fracture, Left  rotator cuff repair, and  biceps tenodesis. Present significant guarding and irritable to passive range of motion of left shoulder and elbow. Significant swelling with orange-peel appearance skin at distal biceps. Lacking full elbow extension, but improved following effleurage massage. Passive external rotation range of motion unable to achieve neutral and shoulder flexion to ~20 degrees. Will progress per patient tolerance and protocol guidelines.     Patient prognosis is Fair/Good.  Patient will benefit from skilled outpatient Physical Therapy to address the deficits stated above and in the chart below,  provide patient /family education, and to maximize patientt's level of independence.     Plan of care discussed with patient: yes  Patient's spiritual, cultural and educational needs considered and patient is agreeable to the plan of care and goals as stated below:     Anticipated Barriers for therapy: high irritability, multiple types of surgeries     Medical Necessity is demonstrated by the following  History  Co-morbidities and personal factors that may impact the plan of care [] LOW: no personal factors / co-morbidities  [] MODERATE: 1-2 personal factors / co-morbidities  [x] HIGH: 3+ personal factors / co-morbidities    Moderate / High Support Documentation:   Co-morbidities affecting plan of care: hx of PE, hx of DVT, HTN, HLD, CAD, High BMI     Personal Factors:   age  coping style  social background  lifestyle     Examination  Body Structures and Functions, activity limitations and participation restrictions that may impact the plan of care [] LOW: addressing 1-2 elements  [x] MODERATE: 3+ elements  [] HIGH: 4+ elements (please support below)    Moderate / High Support Documentation: Based on PMHX, co morbidities , data from assessments and functional level of assistance required with task and clinical presentation directly impacting function.         Clinical Presentation [] LOW: stable  [x] MODERATE: Evolving  [] HIGH: Unstable     Decision Making/ Complexity Score: moderate       Goals:  Short Term Goals (5 Weeks):   1. Patient will be compliant with home exercise program to assist therapy in restoring pain free motion of the shoulder.   2. Patient will improve impaired shoulder manual muscle tests 1/2 grade bilaterally to improve strength for functional tasks.  3. Patient will improve left shoulder flexion >/= 20 degrees to improve functional mobility of upper extremities.  4. Patient will improve left shoulder ER >/= 20 degrees to improve functional mobility of upper extremities.      Long Term Goals  (10 Weeks):   1. Patient will improve FOTO score to </= 39% to demonstrate improvements in carrying, moving, and handling objects  2. Patient will improve impaired shoulder manual muscle tests 1 grade bilaterally to improve strength for household duties.  3. Patient will improve left shoulder flexion to >/= 115 degrees to improve functional mobility of upper extremities.   4. Patient will improve left shoulder external rotation to >/= 65 degrees to improve functional mobility of upper extremities.  5. Patient will return to functional ADL's to return to prior level of function.     Plan     Plan of care Certification: 5/29/2024 to 8/09/2024.    Outpatient Physical Therapy 2 times weekly for 10 weeks to include the following interventions: Manual Therapy, Moist Heat/ Ice, Neuromuscular Re-ed, Patient Education, Self Care, Therapeutic Activities, and Therapeutic Exercise.     Kylie Griffiths PT        Physician's Signature: _________________________________________ Date: ________________    I certify the need for these services furnished under this plan of treatment and while under my care.        Alfred Nunez MD, FAAOS  , Orthopaedic Sports Medicine  Residency   \[facility]\"" Department of Orthopaedic Surgery  Assistant Orthopaedic Surgeon, [place] [facility]  Head Team Physician, [place] [facility]

## 2024-05-30 PROBLEM — R53.1 DECREASED STRENGTH: Status: ACTIVE | Noted: 2024-05-30

## 2024-05-30 PROBLEM — M25.612 DECREASED ROM OF LEFT SHOULDER: Status: ACTIVE | Noted: 2024-05-30

## 2024-05-31 ENCOUNTER — DOCUMENTATION ONLY (OUTPATIENT)
Dept: REHABILITATION | Facility: HOSPITAL | Age: 74
End: 2024-05-31
Payer: MEDICARE

## 2024-05-31 ENCOUNTER — TELEPHONE (OUTPATIENT)
Dept: ORTHOPEDICS | Facility: CLINIC | Age: 74
End: 2024-05-31
Payer: MEDICARE

## 2024-05-31 NOTE — PROGRESS NOTES
"Patient called front desk after-hours on 5/29 & left a voicemail to receive a call back to try to move appointments around to 11am despite telling therapist she is available any day from 11am till close. Front desk gave her a call back the next day (5/30/24) per her request. Front desk informed her some 11am spots were already pre-occupied with existing appointments, but changed that ones that could be moved. Patient confirmed that she had a clear understanding on why some of these appointments could not be moved. Today (5/31/2024) patient's  called saying that he wanted to cancel all of her appointments due to us "not willing to work with them to make all of their appointments at the same time". Matthew vazquez informed  that appointments that could be changed were and the remaining ones would have to stay original times.  informed matthew vazquez he would be moving to another clinic since we could not meet his needs to make every appointment at 11am. Very confrontational with matthew vazquez.    "

## 2024-05-31 NOTE — TELEPHONE ENCOUNTER
Spoke with Nemo at PT. Needs order for PT. Patient has internal referral. Will have  put in an external referral.  Also faxed over the surgery report to her per her request. [phone]

## 2024-05-31 NOTE — TELEPHONE ENCOUNTER
----- Message from Gretta Peralta sent at 5/31/2024  1:38 PM CDT -----  Type:  Patient Returning Call    Who Called:Nemo / [facility]   Would the patient rather a call back or a response via [facility]? Call back   Best Call Back Number:[phone]   [phone]  Additional Information: requesting orders for PT and operation report from her surgery

## 2024-06-03 DIAGNOSIS — S42.92XD CLOSED FRACTURE DISLOCATION OF JOINT OF LEFT SHOULDER GIRDLE WITH ROUTINE HEALING, SUBSEQUENT ENCOUNTER: Primary | ICD-10-CM

## 2024-06-04 ENCOUNTER — TELEPHONE (OUTPATIENT)
Dept: ORTHOPEDICS | Facility: CLINIC | Age: 74
End: 2024-06-04
Payer: MEDICARE

## 2024-06-04 NOTE — TELEPHONE ENCOUNTER
----- Message from Gretta Peralta sent at 5/31/2024  1:38 PM CDT -----  Type:  Patient Returning Call    Who Called:Nemo / [facility]   Would the patient rather a call back or a response via [facility]? Call back   Best Call Back Number:[phone]   [phone]  Additional Information: requesting orders for PT and operation report from her surgery

## 2024-07-09 ENCOUNTER — HOSPITAL ENCOUNTER (OUTPATIENT)
Dept: RADIOLOGY | Facility: HOSPITAL | Age: 74
Discharge: HOME | End: 2024-07-09
Attending: ORTHOPAEDIC SURGERY
Payer: MEDICARE

## 2024-07-09 ENCOUNTER — OFFICE VISIT (OUTPATIENT)
Dept: ORTHOPEDICS | Facility: CLINIC | Age: 74
End: 2024-07-09
Payer: MEDICARE

## 2024-07-09 ENCOUNTER — TELEPHONE (OUTPATIENT)
Dept: ORTHOPEDICS | Facility: CLINIC | Age: 74
End: 2024-07-09

## 2024-07-09 VITALS — BODY MASS INDEX: 29.49 KG/M2 | WEIGHT: 177 LBS | HEIGHT: 65 IN

## 2024-07-09 DIAGNOSIS — M25.512 LEFT SHOULDER PAIN, UNSPECIFIED CHRONICITY: ICD-10-CM

## 2024-07-09 DIAGNOSIS — M25.512 LEFT SHOULDER PAIN, UNSPECIFIED CHRONICITY: Primary | ICD-10-CM

## 2024-07-09 DIAGNOSIS — S42.92XD CLOSED FRACTURE DISLOCATION OF JOINT OF LEFT SHOULDER GIRDLE WITH ROUTINE HEALING, SUBSEQUENT ENCOUNTER: ICD-10-CM

## 2024-07-09 DIAGNOSIS — S42.92XD CLOSED FRACTURE DISLOCATION OF JOINT OF LEFT SHOULDER GIRDLE WITH ROUTINE HEALING, SUBSEQUENT ENCOUNTER: Primary | ICD-10-CM

## 2024-07-09 PROCEDURE — 1159F MED LIST DOCD IN RCRD: CPT | Mod: CPTII,S$GLB,, | Performed by: ORTHOPAEDIC SURGERY

## 2024-07-09 PROCEDURE — 99999 PR PBB SHADOW E&M-EST. PATIENT-LVL III: CPT | Mod: PBBFAC,,, | Performed by: ORTHOPAEDIC SURGERY

## 2024-07-09 PROCEDURE — 73030 X-RAY EXAM OF SHOULDER: CPT | Mod: 26,LT,, | Performed by: RADIOLOGY

## 2024-07-09 PROCEDURE — 1126F AMNT PAIN NOTED NONE PRSNT: CPT | Mod: CPTII,S$GLB,, | Performed by: ORTHOPAEDIC SURGERY

## 2024-07-09 PROCEDURE — 1101F PT FALLS ASSESS-DOCD LE1/YR: CPT | Mod: CPTII,S$GLB,, | Performed by: ORTHOPAEDIC SURGERY

## 2024-07-09 PROCEDURE — 99024 POSTOP FOLLOW-UP VISIT: CPT | Mod: S$GLB,,, | Performed by: ORTHOPAEDIC SURGERY

## 2024-07-09 PROCEDURE — 1160F RVW MEDS BY RX/DR IN RCRD: CPT | Mod: CPTII,S$GLB,, | Performed by: ORTHOPAEDIC SURGERY

## 2024-07-09 PROCEDURE — 73030 X-RAY EXAM OF SHOULDER: CPT | Mod: TC,PN,LT

## 2024-07-09 PROCEDURE — 3288F FALL RISK ASSESSMENT DOCD: CPT | Mod: CPTII,S$GLB,, | Performed by: ORTHOPAEDIC SURGERY

## 2024-07-09 NOTE — PROGRESS NOTES
"Patient ID:   Romy Monae is a 74 y.o. female.    Chief Complaint:   8w 1d s/p ORIF left proximal humerus fracture    HPI:   Patient is now just over 8 weeks out from undergoing surgery on her left proximal humerus.  She continues to wear her sling.  She does continue to report some pain in the left shoulder region.    Medications:    Current Outpatient Medications:     apixaban (ELIQUIS) 5 mg Tab, Take 1 tablet (5 mg total) by mouth 2 (two) times daily., Disp: 180 tablet, Rfl: 3    atorvastatin (LIPITOR) 80 MG tablet, Take 1 tablet (80 mg total) by mouth once daily., Disp: 90 tablet, Rfl: 3    clopidogreL (PLAVIX) 75 mg tablet, Take 1 tablet (75 mg total) by mouth once daily., Disp: 30 tablet, Rfl: 11    latanoprost 0.005 % ophthalmic solution, Place 1 drop into both eyes every evening., Disp: , Rfl:     metoprolol succinate (TOPROL-XL) 25 MG 24 hr tablet, Take 1 tablet (25 mg total) by mouth once daily., Disp: 90 tablet, Rfl: 3    multivit-min/iron/folic/lutein (CENTRUM SILVER WOMEN ORAL), Take 1 tablet by mouth once daily., Disp: , Rfl:     diazePAM (VALIUM) 5 MG tablet, Take 1 tablet (5 mg total) by mouth every 8 (eight) hours as needed for Anxiety., Disp: 30 tablet, Rfl: 0    Allergies:  Review of patient's allergies indicates:  No Known Allergies    Vitals:  Ht 5' 5" (1.651 m)   Wt 80.3 kg (177 lb)   LMP  (LMP Unknown)   BMI 29.45 kg/m²     Physical Examination:  Ortho Exam   Left shoulder exam:  Range of motion is limited with passive elevation to 80, external rotation at the side to neutral.  No erythema.    Imaging Studies:  I have ordered and independently reviewed the following imaging studies performed at [facility] today    X-Ray Shoulder Trauma 3 view Left  Narrative: EXAMINATION:  XR SHOULDER TRAUMA 3 VIEW LEFT    CLINICAL HISTORY:  Pain in left shoulder    COMPARISON:  None.    FINDINGS:  Plate and screw fixation at the level of the proximal humerus.  Glenohumeral relationships are maintained.  " Acromioclavicular joint demonstrates degenerative change.  No evidence of lytic or blastic lesions. Soft tissues are unremarkable.  Impression: S/P ORIF LEFT proximal humerus    Electronically signed by: Bin Paulson MD  Date:    07/09/2024  Time:    10:49        Assessment:  1. Closed fracture dislocation of joint of left shoulder girdle with routine healing, subsequent encounter      Plan:  Patient was advised to discontinue use of the sling.  I have recommended that she continue physical therapy.  Specifically I told her that she may per push her therapy more aggressively given that the fracture looks healed on x-ray.  She will follow up in another 6 weeks for return visit with a new x-ray of the left shoulder.       No follow-ups on file.

## 2024-08-20 ENCOUNTER — HOSPITAL ENCOUNTER (OUTPATIENT)
Dept: RADIOLOGY | Facility: HOSPITAL | Age: 74
Discharge: HOME | End: 2024-08-20
Attending: ORTHOPAEDIC SURGERY
Payer: MEDICARE

## 2024-08-20 ENCOUNTER — OFFICE VISIT (OUTPATIENT)
Dept: ORTHOPEDICS | Facility: CLINIC | Age: 74
End: 2024-08-20
Payer: MEDICARE

## 2024-08-20 VITALS — BODY MASS INDEX: 31.05 KG/M2 | HEIGHT: 63 IN | WEIGHT: 175.25 LBS

## 2024-08-20 DIAGNOSIS — M25.512 LEFT SHOULDER PAIN, UNSPECIFIED CHRONICITY: ICD-10-CM

## 2024-08-20 DIAGNOSIS — S42.92XD CLOSED FRACTURE DISLOCATION OF JOINT OF LEFT SHOULDER GIRDLE WITH ROUTINE HEALING, SUBSEQUENT ENCOUNTER: ICD-10-CM

## 2024-08-20 DIAGNOSIS — S42.92XD CLOSED FRACTURE DISLOCATION OF JOINT OF LEFT SHOULDER GIRDLE WITH ROUTINE HEALING, SUBSEQUENT ENCOUNTER: Primary | ICD-10-CM

## 2024-08-20 PROCEDURE — 1159F MED LIST DOCD IN RCRD: CPT | Mod: CPTII,S$GLB,, | Performed by: ORTHOPAEDIC SURGERY

## 2024-08-20 PROCEDURE — 99214 OFFICE O/P EST MOD 30 MIN: CPT | Mod: S$GLB,,, | Performed by: ORTHOPAEDIC SURGERY

## 2024-08-20 PROCEDURE — 3288F FALL RISK ASSESSMENT DOCD: CPT | Mod: CPTII,S$GLB,, | Performed by: ORTHOPAEDIC SURGERY

## 2024-08-20 PROCEDURE — 99999 PR PBB SHADOW E&M-EST. PATIENT-LVL III: CPT | Mod: PBBFAC,,, | Performed by: ORTHOPAEDIC SURGERY

## 2024-08-20 PROCEDURE — 1101F PT FALLS ASSESS-DOCD LE1/YR: CPT | Mod: CPTII,S$GLB,, | Performed by: ORTHOPAEDIC SURGERY

## 2024-08-20 PROCEDURE — 1125F AMNT PAIN NOTED PAIN PRSNT: CPT | Mod: CPTII,S$GLB,, | Performed by: ORTHOPAEDIC SURGERY

## 2024-08-20 PROCEDURE — 1160F RVW MEDS BY RX/DR IN RCRD: CPT | Mod: CPTII,S$GLB,, | Performed by: ORTHOPAEDIC SURGERY

## 2024-08-20 PROCEDURE — 73030 X-RAY EXAM OF SHOULDER: CPT | Mod: TC,PN,LT

## 2024-08-20 PROCEDURE — 73030 X-RAY EXAM OF SHOULDER: CPT | Mod: 26,LT,, | Performed by: RADIOLOGY

## 2024-08-20 NOTE — PROGRESS NOTES
"Patient ID:   Romy Monae is a 74 y.o. female.    Chief Complaint:   3m 7d s/p ORIF left proximal humerus fracture    HPI:   The patient is returning today for evaluation of the left shoulder.  She is doing well.  Pain level 3/10.  She is currently participating in a home exercise program.  She does report some limited range of motion.    Medications:    Current Outpatient Medications:     apixaban (ELIQUIS) 5 mg Tab, Take 1 tablet (5 mg total) by mouth 2 (two) times daily., Disp: 180 tablet, Rfl: 3    atorvastatin (LIPITOR) 80 MG tablet, Take 1 tablet (80 mg total) by mouth once daily., Disp: 90 tablet, Rfl: 3    clopidogreL (PLAVIX) 75 mg tablet, Take 1 tablet (75 mg total) by mouth once daily., Disp: 30 tablet, Rfl: 11    latanoprost 0.005 % ophthalmic solution, Place 1 drop into both eyes every evening., Disp: , Rfl:     metoprolol succinate (TOPROL-XL) 25 MG 24 hr tablet, Take 1 tablet (25 mg total) by mouth once daily., Disp: 90 tablet, Rfl: 3    multivit-min/iron/folic/lutein (CENTRUM SILVER WOMEN ORAL), Take 1 tablet by mouth once daily., Disp: , Rfl:     diazePAM (VALIUM) 5 MG tablet, Take 1 tablet (5 mg total) by mouth every 8 (eight) hours as needed for Anxiety., Disp: 30 tablet, Rfl: 0    Allergies:  Review of patient's allergies indicates:  No Known Allergies    Vitals:  Ht 5' 3" (1.6 m)   Wt 79.5 kg (175 lb 4.3 oz)   LMP  (LMP Unknown)   BMI 31.05 kg/m²     Physical Examination:  Ortho Exam   Left shoulder exam:   Range of motion today reveals elevation 100°, external rotation at the side 20°.    Imaging studies:   I have ordered and independently reviewed the following imaging studies performed at [facility] today    Left shoulder x-ray series demonstrates a healed left proximal humerus fracture status post open reduction internal fixation.    Assessment:  1. Closed fracture dislocation of joint of left shoulder girdle with routine healing, subsequent encounter      Plan:  Patient was instructed in " a continued home exercise program.  Patient will continue with mostly range-of-motion exercises.  She will follow up as needed.       No follow-ups on file.

## 2024-09-24 ENCOUNTER — TELEPHONE (OUTPATIENT)
Dept: CARDIOLOGY | Facility: CLINIC | Age: 74
End: 2024-09-24
Payer: MEDICARE

## 2024-09-24 DIAGNOSIS — I26.90 ACUTE SEPTIC PULMONARY EMBOLISM WITHOUT ACUTE COR PULMONALE: ICD-10-CM

## 2024-09-24 DIAGNOSIS — I10 HYPERTENSION, UNSPECIFIED TYPE: ICD-10-CM

## 2024-09-24 DIAGNOSIS — E78.5 HYPERLIPIDEMIA, UNSPECIFIED HYPERLIPIDEMIA TYPE: ICD-10-CM

## 2024-09-24 RX ORDER — CLOPIDOGREL BISULFATE 75 MG/1
75 TABLET ORAL DAILY
Qty: 30 TABLET | Refills: 11 | Status: CANCELLED | OUTPATIENT
Start: 2024-09-23 | End: 2025-09-23

## 2024-09-24 RX ORDER — ATORVASTATIN CALCIUM 80 MG/1
80 TABLET, FILM COATED ORAL DAILY
Qty: 90 TABLET | Refills: 3 | Status: CANCELLED | OUTPATIENT
Start: 2024-09-23 | End: 2025-09-23

## 2024-09-26 NOTE — TELEPHONE ENCOUNTER
I denied her refill request, because she hasn't been here since December of 2022.  Please call her and schedule her for a routine follow up.

## 2025-04-17 DIAGNOSIS — Z86.711 HISTORY OF PULMONARY EMBOLUS (PE): ICD-10-CM

## 2025-04-17 DIAGNOSIS — Z86.718 HISTORY OF DVT (DEEP VEIN THROMBOSIS): ICD-10-CM

## 2025-04-17 DIAGNOSIS — Z95.5 PRESENCE OF STENT IN LAD CORONARY ARTERY: ICD-10-CM

## 2025-04-17 DIAGNOSIS — D68.59 ANTITHROMBIN III DEFICIENCY: ICD-10-CM

## 2025-04-17 DIAGNOSIS — D68.59 HYPERCOAGULABLE STATE, PRIMARY: ICD-10-CM

## 2025-04-17 DIAGNOSIS — I25.10 CORONARY ARTERY DISEASE INVOLVING NATIVE CORONARY ARTERY OF NATIVE HEART WITHOUT ANGINA PECTORIS: ICD-10-CM

## 2025-04-17 DIAGNOSIS — E78.5 HYPERLIPIDEMIA, UNSPECIFIED HYPERLIPIDEMIA TYPE: ICD-10-CM

## 2025-04-17 DIAGNOSIS — I10 PRIMARY HYPERTENSION: ICD-10-CM

## 2025-04-17 RX ORDER — METOPROLOL SUCCINATE 25 MG/1
25 TABLET, EXTENDED RELEASE ORAL DAILY
Qty: 90 TABLET | Refills: 3 | Status: CANCELLED | OUTPATIENT
Start: 2025-04-17 | End: 2026-04-17

## 2025-04-17 RX ORDER — METOPROLOL SUCCINATE 25 MG/1
25 TABLET, EXTENDED RELEASE ORAL DAILY
Qty: 90 TABLET | Refills: 3 | Status: SHIPPED | OUTPATIENT
Start: 2025-04-17 | End: 2026-04-17

## (undated) DEVICE — GUIDE LAUNCHER 6FR EBU 3.0

## (undated) DEVICE — STRIP MEDI WND CLSR 1/2X4IN

## (undated) DEVICE — HEMOSTAT VASC BAND REG 24CM

## (undated) DEVICE — GOWN POLY REINF BRTH SLV XL

## (undated) DEVICE — SUT VICRYL PLUS 2-0 CT1 18

## (undated) DEVICE — DRAPE C-ARMOR EQUIPMENT COVER

## (undated) DEVICE — COVER TABLE HVY DTY 60X90IN

## (undated) DEVICE — SUPPORT SLING SHOT II MEDIUM

## (undated) DEVICE — PACK SURGERY START

## (undated) DEVICE — DRESSING XEROFORM NONADH 1X8IN

## (undated) DEVICE — BNDG COFLEX FOAM LF2 ST 4X5YD

## (undated) DEVICE — DRILL BIT

## (undated) DEVICE — ELECTRODE REM PLYHSV RETURN 9

## (undated) DEVICE — PACK ORTHOPEDIC IV ECLIPSE

## (undated) DEVICE — DRAPE U SPLIT SHEET 54X76IN

## (undated) DEVICE — DRAPE C-ARM/MOBILE XRAY 44X80

## (undated) DEVICE — GLOVE BIOGEL ORTHOPEDIC 8

## (undated) DEVICE — DRAPE ANGIO BRACH 38X44IN

## (undated) DEVICE — DRAPE TIBURON ORTHOPEDIC SPLIT

## (undated) DEVICE — DRAPE STERI U-SHAPED 47X51IN

## (undated) DEVICE — KIT GLIDESHEATH SLEND 6FR 10CM

## (undated) DEVICE — TOWEL OR DISP STRL BLUE 4/PK

## (undated) DEVICE — Device

## (undated) DEVICE — GUIDE LAUNCHER 6FR EBU 3.5

## (undated) DEVICE — DRAPE INCISE IOBAN 2 23X23IN

## (undated) DEVICE — CATH EAGLE EYE ST .014X20X150

## (undated) DEVICE — DRAPE STERI INSTRUMENT 1018

## (undated) DEVICE — PAD PREP CUFFED NS 24X48IN

## (undated) DEVICE — COVER PROBE US 5.5X58L NON LTX

## (undated) DEVICE — GLOVE BIOGEL PI MICRO INDIC 8

## (undated) DEVICE — ADHESIVE DERMABOND ADVANCED

## (undated) DEVICE — SPONGE LAP 18X18 PREWASHED

## (undated) DEVICE — SPONGE COTTON TRAY 4X4IN

## (undated) DEVICE — CATH JACKY RADIAL 3.5 110CM

## (undated) DEVICE — GUIDEWIRE COUGAR XT 190 ST HY

## (undated) DEVICE — SUT VICRYL PLUS 0 CT1 18IN

## (undated) DEVICE — CONTRAST VISIPAQUE 50ML

## (undated) DEVICE — KIT LEFT HEART MANIFOLD CUSTOM

## (undated) DEVICE — GOWN POLY REINF X-LONG 2XL

## (undated) DEVICE — BLADE SURG CARBON STEEL #10

## (undated) DEVICE — PAD DEFIB CADENCE ADULT R2

## (undated) DEVICE — CATH NC QUANTUM APEX MR 2.5X12

## (undated) DEVICE — NDL SURG MAYO CATGUT

## (undated) DEVICE — DRESSING AQUACEL AG 3.5X10IN

## (undated) DEVICE — PAD ABDOMINAL STERILE 5X9IN

## (undated) DEVICE — KIT EVACUATOR 3-SPRING 1/8 DRN

## (undated) DEVICE — COVER PROXIMA MAYO STAND

## (undated) DEVICE — SUT 1 36IN PDS II

## (undated) DEVICE — DRAPE THREE-QTR REINF 53X77IN

## (undated) DEVICE — YANKAUER OPEN TIP W/O VENT

## (undated) DEVICE — DRAPE STERI-DRAPE 1000 17X11IN

## (undated) DEVICE — SUT CTD VICRYL 2.0

## (undated) DEVICE — GOWN POLY REINF BRTH SLV LG

## (undated) DEVICE — APPLICATOR CHLORAPREP ORN 26ML

## (undated) DEVICE — COVER OVERHEAD SURG LT BLUE

## (undated) DEVICE — CATH IMPULSE 5FR PIGTAIL 125CM